# Patient Record
Sex: FEMALE | Race: WHITE | NOT HISPANIC OR LATINO | Employment: OTHER | URBAN - METROPOLITAN AREA
[De-identification: names, ages, dates, MRNs, and addresses within clinical notes are randomized per-mention and may not be internally consistent; named-entity substitution may affect disease eponyms.]

---

## 2017-01-02 ENCOUNTER — HOSPITAL ENCOUNTER (EMERGENCY)
Facility: OTHER | Age: 32
Discharge: HOME | End: 2017-01-02
Attending: OBSTETRICS & GYNECOLOGY
Payer: COMMERCIAL

## 2017-01-02 VITALS
DIASTOLIC BLOOD PRESSURE: 58 MMHG | HEART RATE: 74 BPM | TEMPERATURE: 98 F | OXYGEN SATURATION: 99 % | SYSTOLIC BLOOD PRESSURE: 90 MMHG

## 2017-01-02 DIAGNOSIS — Z3A.34 34 WEEKS GESTATION OF PREGNANCY: ICD-10-CM

## 2017-01-02 DIAGNOSIS — R80.9 PROTEIN IN URINE: ICD-10-CM

## 2017-01-02 DIAGNOSIS — J01.00 ACUTE NON-RECURRENT MAXILLARY SINUSITIS: Primary | ICD-10-CM

## 2017-01-02 LAB
ALBUMIN SERPL BCP-MCNC: 2.7 G/DL
ALP SERPL-CCNC: 201 U/L
ALT SERPL W/O P-5'-P-CCNC: 8 U/L
ANION GAP SERPL CALC-SCNC: 10 MMOL/L
AST SERPL-CCNC: 8 U/L
BASOPHILS # BLD AUTO: 0.02 K/UL
BASOPHILS NFR BLD: 0.2 %
BILIRUB SERPL-MCNC: 0.3 MG/DL
BUN SERPL-MCNC: 6 MG/DL
CALCIUM SERPL-MCNC: 8.7 MG/DL
CHLORIDE SERPL-SCNC: 105 MMOL/L
CO2 SERPL-SCNC: 20 MMOL/L
CREAT SERPL-MCNC: 0.7 MG/DL
CREAT UR-MCNC: 28.7 MG/DL
DIFFERENTIAL METHOD: ABNORMAL
EOSINOPHIL # BLD AUTO: 0.1 K/UL
EOSINOPHIL NFR BLD: 0.9 %
ERYTHROCYTE [DISTWIDTH] IN BLOOD BY AUTOMATED COUNT: 12.7 %
EST. GFR  (AFRICAN AMERICAN): >60 ML/MIN/1.73 M^2
EST. GFR  (NON AFRICAN AMERICAN): >60 ML/MIN/1.73 M^2
GLUCOSE SERPL-MCNC: 99 MG/DL
HCT VFR BLD AUTO: 33.4 %
HGB BLD-MCNC: 11.5 G/DL
LDH SERPL L TO P-CCNC: 160 U/L
LYMPHOCYTES # BLD AUTO: 1.5 K/UL
LYMPHOCYTES NFR BLD: 14.6 %
MCH RBC QN AUTO: 29.3 PG
MCHC RBC AUTO-ENTMCNC: 34.4 %
MCV RBC AUTO: 85 FL
MONOCYTES # BLD AUTO: 0.7 K/UL
MONOCYTES NFR BLD: 6.5 %
NEUTROPHILS # BLD AUTO: 8.1 K/UL
NEUTROPHILS NFR BLD: 77.4 %
PLATELET # BLD AUTO: 201 K/UL
PMV BLD AUTO: 9.1 FL
POTASSIUM SERPL-SCNC: 3.9 MMOL/L
PROT SERPL-MCNC: 6.5 G/DL
PROT UR-MCNC: 16 MG/DL
PROT/CREAT RATIO, UR: 0.56
RBC # BLD AUTO: 3.92 M/UL
SODIUM SERPL-SCNC: 135 MMOL/L
URATE SERPL-MCNC: 5 MG/DL
WBC # BLD AUTO: 10.4 K/UL

## 2017-01-02 PROCEDURE — 99283 EMERGENCY DEPT VISIT LOW MDM: CPT | Mod: ,,, | Performed by: OBSTETRICS & GYNECOLOGY

## 2017-01-02 RX ORDER — BUTALBITAL, ACETAMINOPHEN AND CAFFEINE 50; 325; 40 MG/1; MG/1; MG/1
1 TABLET ORAL EVERY 4 HOURS PRN
Qty: 15 TABLET | Refills: 0 | Status: SHIPPED | OUTPATIENT
Start: 2017-01-02 | End: 2017-01-10

## 2017-01-02 RX ORDER — BUTALBITAL, ACETAMINOPHEN AND CAFFEINE 50; 325; 40 MG/1; MG/1; MG/1
1 TABLET ORAL ONCE
Status: COMPLETED | OUTPATIENT
Start: 2017-01-02 | End: 2017-01-02

## 2017-01-02 RX ADMIN — METHYLPREDNISOLONE SODIUM SUCCINATE 20 MG: 40 INJECTION, POWDER, FOR SOLUTION INTRAMUSCULAR; INTRAVENOUS at 11:01

## 2017-01-02 RX ADMIN — SODIUM CHLORIDE, SODIUM LACTATE, POTASSIUM CHLORIDE, AND CALCIUM CHLORIDE 1000 ML: .6; .31; .03; .02 INJECTION, SOLUTION INTRAVENOUS at 11:01

## 2017-01-02 RX ADMIN — BUTALBITAL, ACETAMINOPHEN AND CAFFEINE 1 TABLET: 50; 325; 40 TABLET ORAL at 11:01

## 2017-01-02 NOTE — ED PROVIDER NOTES
Encounter Date: 2017       History     Chief Complaint   Patient presents with    Headache    Contractions     Review of patient's allergies indicates:   Allergen Reactions    Augmentin [amoxicillin-pot clavulanate] Nausea And Vomiting     HPI Comments: Zandra is a 30yo  at 34 5/7wga here for headache and sinus infection for the last week.  She is a CNM patient.   She states that around a week ago, she started having sinus pressure and headaches.  She tried several otc medications, but the sinus pain persisted.  She was started on zpack yesterday, which gave her diarrhea.  She then po hydrated, but started having contractions  At this point, she still has a headache and was hesitant to take her usual headache medicine, which is fioricet.   She states that contractions are mild, no gush of fluid or vaginal bleeding.  Normal fetal movemement.      Past Medical History   Diagnosis Date    Depression 2015     Lexapro and Wellbrutrin for about 8months.    Infertility     Migraine headache      for 4 months.before pregnancy at beginning of [facility]    Neurological abnormality      States she was diagnosed with Chiari malformation when being evaluated for migraines. Was told it is benign.     Past Medical History Pertinent Negatives   Diagnosis Date Noted    Abnormal Pap smear 2013    Abnormal Pap smear of cervix 2016    Asthma 2016    Blood dyscrasia 2016    Breast cancer 2016    Complication of anesthesia 2016    Coronary artery disease 2016    Deep vein thrombosis 2016    Diabetes mellitus 2016    Disorder of kidney and ureter 2016    Herpes simplex virus (HSV) infection 2016    HIV infection 2016    Hyperlipidemia 2016    Hypertension 2016    Liver disease 2016    Postpartum depression 2016    Rh incompatibility 2016    Seizures 2016    Sickle cell anemia 2016    Stroke 2016     Systemic lupus erythematosus 7/19/2016    Thyroid disease 7/19/2016    Trauma 7/19/2016    Varicosities 7/19/2016     Past Surgical History   Procedure Laterality Date    Breast reduction  2004    Laparscopic surgery       endometriosis    Breast surgery       reduction      Family History   Problem Relation Age of Onset    Prostate cancer Father     Breast cancer Mother 62     negative genetic testing     Colon cancer Maternal Aunt      Social History   Substance Use Topics    Smoking status: Never Smoker    Smokeless tobacco: Never Used    Alcohol use No     Review of Systems   Constitutional: Positive for activity change and fatigue. Negative for appetite change and fever.   Respiratory: Negative for cough, chest tightness, shortness of breath and wheezing.    Genitourinary: Negative for difficulty urinating, dysuria, frequency and vaginal bleeding.   Neurological: Positive for headaches. Negative for dizziness, weakness and light-headedness.       Physical Exam   Initial Vitals   BP Pulse Resp Temp SpO2   01/02/17 0956 01/02/17 0956 -- 01/02/17 0959 01/02/17 0959   98/63 80  98.1 °F (36.7 °C) 99 %     Physical Exam    Constitutional: She appears well-developed and well-nourished. She is not diaphoretic. No distress.   HENT:   Head: Normocephalic and atraumatic.   Cardiovascular: Normal rate, regular rhythm and normal heart sounds.   No murmur heard.  Pulmonary/Chest: Breath sounds normal. No respiratory distress. She has no wheezes. She has no rales.   Abdominal: Soft. She exhibits no distension. There is no tenderness. There is no rebound and no guarding.   Skin: Skin is warm and dry. No erythema. No pallor.   Psychiatric: She has a normal mood and affect. Judgment and thought content normal.     OB LABOR EXAM:   Pre-Term Labor: No.   Membranes ruptured: No.   Method: Sterile vaginal exam per MD.   Vaginal Bleeding: none present.     Dilatation: 0.   Station: -5.   Amniotic Fluid Color: no fluid.      Comments: NST: 120/reactive/no decels, moderate btbv  No contractions         ED Course   Procedures  Labs Reviewed   CBC W/ AUTO DIFFERENTIAL   COMPREHENSIVE METABOLIC PANEL   PROTEIN / CREATININE RATIO, URINE   LACTATE DEHYDROGENASE   URIC ACID                               ED Course   Comment By Time   Pt reevaluated.  Still with headache, got fioricet 20 minutes ago. IV fluids going in.  Pending labs  Dilia JOVAN Mesfin, DO 01/02 1133   Pt still with headache.  Labs reviewed.  Increased P:C ratio, but serum labs fine.  I still believe that headache is due to sinus infection, but she is asked to return 24h urine and follow up with CNM's tomorrow.  She is asked to come back to [facility] if she has any other concerns Dilia Toure, DO 01/02 1202     Clinical Impression:   The primary encounter diagnosis was Acute non-recurrent maxillary sinusitis. Diagnoses of Protein in urine and 34 weeks gestation of pregnancy were also pertinent to this visit.          Dilia Toure, DO  01/02/17 4048

## 2017-01-02 NOTE — ED AVS SNAPSHOT
[facility]  [address]  [facility] [address]               Zandra Sanchez   2017  9:48 AM   ED    Description:  Female : 1985   Department:  [facility]           Your Care was Coordinated By:     Provider Role From To    Dilia Toure DO Attending Provider 17 0957 --      Reason for Visit     Headache     Contractions           Diagnoses this Visit        Comments    Acute non-recurrent maxillary sinusitis    -  Primary     Protein in urine         34 weeks gestation of pregnancy           ED Disposition     ED Disposition Condition Comment    Discharge             To Do List           Follow-up Information     Follow up with [facility] In 1 day.    Why:  Blood pressure check and follow up 24hour urine    Contact information:    [address] [place] [address]  [phone]         These Medications        Disp Refills Start End    butalbital-acetaminophen-caffeine -40 mg (FIORICET, ESGIC) -40 mg per tablet 15 tablet 0 2017     Take 1 tablet by mouth every 4 (four) hours as needed for Pain. - Oral    [facility]: [facility], [place] - [address] [place] [address]. Ph #: [phone]         [facility] On Call     [facility] On [facility] Nurse Care Line -  Assistance  Registered nurses in the [facility] On [facility] Center provide clinical advisement, health education, appointment booking, and other advisory services.  Call for this free service at [phone].             Medications           Message regarding Medications     Verify the changes and/or additions to your medication regime listed below are the same as discussed with your clinician today.  If any of these changes or additions are incorrect, please notify your healthcare provider.        START taking these NEW medications        Refills    butalbital-acetaminophen-caffeine -40 mg (FIORICET, ESGIC) -40  mg per tablet 0    Sig: Take 1 tablet by mouth every 4 (four) hours as needed for Pain.    Class: Normal    Route: Oral      These medications were administered today        Dose Freq    lactated ringers bolus 1,000 mL 1,000 mL Once    Sig: Inject 1,000 mLs into the vein once.    Class: Normal    Route: Intravenous    butalbital-acetaminophen-caffeine -40 mg per tablet 1 tablet 1 tablet Once    Sig: Take 1 tablet by mouth once.    Class: Normal    Route: Oral    methylPREDNISolone sodium succinate injection 20 mg 20 mg Every 6 hours    Sig: Inject 20 mg into the muscle every 6 (six) hours.    Class: Normal    Route: Intramuscular           Verify that the below list of medications is an accurate representation of the medications you are currently taking.  If none reported, the list may be blank. If incorrect, please contact your healthcare provider. Carry this list with you in case of emergency.           Current Medications     butalbital-acetaminophen-caffeine -40 mg (FIORICET, ESGIC) -40 mg per tablet Take 1 tablet by mouth every 4 (four) hours as needed for Pain.    methylPREDNISolone sodium succinate injection 20 mg Inject 20 mg into the muscle every 6 (six) hours.    prenatal multivit-Ca-min-Fe-FA Tab Take by mouth.    progesterone (PROMETRIUM) 100 MG capsule            Clinical Reference Information           Your Vitals Were     BP Pulse Temp Last Period SpO2       90/58 74 98.1 °F (36.7 °C) 05/04/2016 99%       Allergies as of 1/2/2017        Reactions    Augmentin [Amoxicillin-pot Clavulanate] Nausea And Vomiting      Immunizations Administered on Date of Encounter - 1/2/2017     None      ED Micro, Lab, POCT     Start Ordered       Status Ordering Provider    01/02/17 1204 01/02/17 1203  Microalbumin, Timed Urine  Once      Acknowledged     01/02/17 1012 01/02/17 1012  CBC auto differential  STAT      Final result     01/02/17 1012 01/02/17 1012  Comprehensive metabolic panel  STAT       Final result     01/02/17 1012 01/02/17 1012  Protein / creatinine ratio, urine  Once      Final result     01/02/17 1012 01/02/17 1012  Lactate dehydrogenase  Once      Final result     01/02/17 1012 01/02/17 1012  Uric acid  STAT      Final result       ED Imaging Orders     None        Discharge Instructions       Call clinic [phone] or L & D after hours at [phone] for vaginal bleeding, leakage of fluids, regular contractions every 5 mins for 2 hours, decreased fetal movements ( 10 kicks in 2 hours), headache not relieved by Tylenol, blurry vision, or temp of 100.4 or greater.  Begin doing fetal kick counts, at least 10 movements in 2 hours starting at 28 weeks gestation.  Keep next clinic appointment      Your Scheduled Appointments     Asif 10, 2017  1:30 PM CST   Routine Prenatal Visit with WENDI Fay ([facility])    [address] [place] [id number] [phone]               [facility] complies with applicable Federal civil rights laws and does not discriminate on the basis of race, color, national origin, age, disability, or sex.        Language Assistance Services     ATTENTION: Language assistance services are available, free of charge. Please call [phone].      ATENCIÓN: Si habla español, tiene a joy disposición servicios gratuitos de asistencia lingüística. Llame al [phone].     [facility] Ý: N?u b?n nói Ti?ng Vi?t, có các d?ch v? h? tr? ngôn ng? mi?n phí dành cho b?n. G?i s? [phone].

## 2017-01-02 NOTE — PROGRESS NOTES
Per MD patient ok to discharge home.  Discharge instructions given and patient verbalized understanding.

## 2017-01-02 NOTE — DISCHARGE INSTRUCTIONS
Call clinic [phone] or L & D after hours at [phone] for vaginal bleeding, leakage of fluids, regular contractions every 5 mins for 2 hours, decreased fetal movements ( 10 kicks in 2 hours), headache not relieved by Tylenol, blurry vision, or temp of 100.4 or greater.  Begin doing fetal kick counts, at least 10 movements in 2 hours starting at 28 weeks gestation.  Keep next clinic appointment

## 2017-01-03 ENCOUNTER — TELEPHONE (OUTPATIENT)
Dept: OBSTETRICS AND GYNECOLOGY | Facility: CLINIC | Age: 32
End: 2017-01-03

## 2017-01-03 ENCOUNTER — ROUTINE PRENATAL (OUTPATIENT)
Dept: OBSTETRICS AND GYNECOLOGY | Facility: CLINIC | Age: 32
End: 2017-01-03
Payer: COMMERCIAL

## 2017-01-03 VITALS — DIASTOLIC BLOOD PRESSURE: 58 MMHG | SYSTOLIC BLOOD PRESSURE: 92 MMHG | WEIGHT: 155 LBS | BODY MASS INDEX: 23.57 KG/M2

## 2017-01-03 DIAGNOSIS — J01.40 ACUTE NON-RECURRENT PANSINUSITIS: ICD-10-CM

## 2017-01-03 DIAGNOSIS — Z34.83 ENCOUNTER FOR SUPERVISION OF OTHER NORMAL PREGNANCY IN THIRD TRIMESTER: Primary | ICD-10-CM

## 2017-01-03 DIAGNOSIS — R51.9 PREGNANCY HEADACHE IN THIRD TRIMESTER: ICD-10-CM

## 2017-01-03 DIAGNOSIS — O26.893 PREGNANCY HEADACHE IN THIRD TRIMESTER: ICD-10-CM

## 2017-01-03 LAB
PROT 24H UR-MRATE: NORMAL MG/SPEC
PROT UR-MCNC: <7 MG/DL
URINE COLLECTION DURATION: 24 HR
URINE VOLUME: 2275 ML

## 2017-01-03 PROCEDURE — 84156 ASSAY OF PROTEIN URINE: CPT

## 2017-01-03 PROCEDURE — 0502F SUBSEQUENT PRENATAL CARE: CPT | Mod: S$GLB,,, | Performed by: ADVANCED PRACTICE MIDWIFE

## 2017-01-03 PROCEDURE — 99999 PR PBB SHADOW E&M-EST. PATIENT-LVL III: CPT | Mod: PBBFAC,,, | Performed by: ADVANCED PRACTICE MIDWIFE

## 2017-01-03 RX ORDER — SULFAMETHOXAZOLE AND TRIMETHOPRIM 400; 80 MG/1; MG/1
1 TABLET ORAL 2 TIMES DAILY
Qty: 20 TABLET | Refills: 0 | Status: SHIPPED | OUTPATIENT
Start: 2017-01-03 | End: 2017-01-10

## 2017-01-03 RX ORDER — AZITHROMYCIN 250 MG/1
TABLET, FILM COATED ORAL
Refills: 0 | COMMUNITY
Start: 2017-01-01 | End: 2017-01-04

## 2017-01-03 RX ORDER — SULFAMETHOXAZOLE AND TRIMETHOPRIM 400; 80 MG/1; MG/1
1 TABLET ORAL 2 TIMES DAILY
Qty: 20 TABLET | Refills: 0 | Status: SHIPPED | OUTPATIENT
Start: 2017-01-03 | End: 2017-01-03 | Stop reason: SDUPTHER

## 2017-01-03 NOTE — TELEPHONE ENCOUNTER
"Discussed patient's continuing headache on the phone with her. She has had a sinus infection "for a month now." She has used neti pots, steaming, essential oils, saline nasal sprays, sudafed, mucinex, all without relief. She has started a Z pack now, as well. She used Fioricet several times yesterday, which helped her headache. She is currently at home in bed with another migraine and has taken two Fioricet this morning. She is completing a 24 urine protein, which will be done at 2pm today. I scheduled her an appointment for 2pm today.   ~ jose luis miller cnm    ----- Message from Joesph Thomas sent at 1/3/2017 10:22 AM CST -----  Contact: Malathi Burns  Per Malathi Burns## Pt needs to be seen today for severe headaches. Please call and advise her that she needs to been seen as soon as possible.    "

## 2017-01-03 NOTE — PROGRESS NOTES
"Patient was seen in the OB ED for this sinus infection and headache yesterday, as well. She has brought her 24 hour timed urine protein with her. She has had a sinus infection "for a month now." She has used neti pots, steaming, essential oils, saline nasal sprays, sudafed, mucinex, all with minimal relief. She has started a Z pack 3 days ago. She used Fioricet several times yesterday, which helped her headache. Has used Fioricet several times today and was starting to get relief this afternoon. No fever. She has been working minimally from home, so not overly stressed from work.    A: 1. Continued sinus infection, not responsive to Z-nitesh  2. R/O pre-eclampsia    P: 1. Consult with Dr. Soto  2. Rx: Bactrim 400/80mg - one po bid x 5 days, may take up to ten days. Not close to term. Begin tonight.  3. To call if dramatic improvement not noted over the next two days.  4. Comfort measures reviewed.   ~ jose luis miller cnm    "

## 2017-01-04 ENCOUNTER — TELEPHONE (OUTPATIENT)
Dept: OBSTETRICS AND GYNECOLOGY | Facility: CLINIC | Age: 32
End: 2017-01-04

## 2017-01-04 ENCOUNTER — HOSPITAL ENCOUNTER (EMERGENCY)
Facility: OTHER | Age: 32
Discharge: HOME | End: 2017-01-04
Attending: OBSTETRICS & GYNECOLOGY
Payer: COMMERCIAL

## 2017-01-04 VITALS
HEART RATE: 72 BPM | DIASTOLIC BLOOD PRESSURE: 59 MMHG | OXYGEN SATURATION: 96 % | SYSTOLIC BLOOD PRESSURE: 96 MMHG | TEMPERATURE: 98 F

## 2017-01-04 DIAGNOSIS — R51.9 HEADACHE, UNSPECIFIED HEADACHE TYPE: Primary | ICD-10-CM

## 2017-01-04 RX ORDER — BUTALBITAL, ACETAMINOPHEN AND CAFFEINE 50; 325; 40 MG/1; MG/1; MG/1
2 TABLET ORAL EVERY 6 HOURS PRN
Qty: 30 TABLET | Refills: 2 | Status: SHIPPED | OUTPATIENT
Start: 2017-01-04 | End: 2017-01-10

## 2017-01-04 RX ORDER — LANOLIN ALCOHOL/MO/W.PET/CERES
400 CREAM (GRAM) TOPICAL DAILY
Refills: 15 | COMMUNITY
Start: 2017-01-04 | End: 2017-01-10

## 2017-01-04 RX ORDER — PROMETHAZINE HYDROCHLORIDE 25 MG/1
25 TABLET ORAL EVERY 6 HOURS PRN
Qty: 30 TABLET | Refills: 6 | Status: SHIPPED | OUTPATIENT
Start: 2017-01-04 | End: 2017-01-10

## 2017-01-04 NOTE — TELEPHONE ENCOUNTER
----- Message from Chris Londono sent at 1/4/2017  1:40 PM CST -----  Contact: González with [facility]  x_ 1st Request  _ 2nd Request  _ 3rd Request    Who: González with [facility]    Why:González with [facility] is calling in regard to a Magnesium Oxide RX that was suppose to be called in for the patient. González with [facility] is needing a call back from staff today.    What Number to Call Back: González rocha [facility] can be reached at [phone].    When to Expect a call back: (Before the end of the day)  -- if call after 3:00 call back will be tomorrow.

## 2017-01-04 NOTE — TELEPHONE ENCOUNTER
----- Message from Joesph Thomas sent at 1/4/2017  9:18 AM CST -----  Contact: PT.  Pt states that she having another blinding headache today. She was seen yesterday by Rosamaria and was advise that if they get worse please call. Please call and advise her on what she needs to do at this point.

## 2017-01-04 NOTE — ED PROVIDER NOTES
"Encounter Date: 2017       History     Chief Complaint   Patient presents with    Migraine     Review of patient's allergies indicates:   Allergen Reactions    Augmentin [amoxicillin-pot clavulanate] Nausea And Vomiting     HPI Comments: Zandra Sanchez is a 31 y.o. [id number] at 35w0d presents complaining of new onset head ache in setting of recent sinus infection. Patient reports three days of headache that are worse in the morning than at night, bilateral in the front and as well as the the middle of the back of her head. Patient denies fever, N/V. Does endorse mild shortness of breath. Patient denies leg swelling or leg pain. Endorses photophobia and blurry vision.     Patient recently treated for sinusitis with azithromycin that was then switched to Bactrim. Patient reports decreased nasal drainingfollowing antibiotics use. Patient endorses history of "hormonal headaches" in the past related to her cycle. Patient usually takes daily magnesium sulfate "for general wellness" and has not taken any over the last week.     This IUP is uncomplicated.  Patient reports contractions intermittently, none organized, denies vaginal bleeding, denies LOF.   Fetal Movement: normal.      The history is provided by the patient.     Past Medical History   Diagnosis Date    Depression 2015     Lexapro and Wellbrutrin for about 8months.    Infertility     Migraine headache      for 4 months.before pregnancy at beginning of [facility]    Neurological abnormality      States she was diagnosed with Chiari malformation when being evaluated for migraines. Was told it is benign.     Past Medical History Pertinent Negatives   Diagnosis Date Noted    Abnormal Pap smear 2013    Abnormal Pap smear of cervix 2016    Asthma 2016    Blood dyscrasia 2016    Breast cancer 2016    Complication of anesthesia 2016    Coronary artery disease 2016    Deep vein thrombosis 2016    Diabetes mellitus " 7/19/2016    Disorder of kidney and ureter 7/19/2016    Herpes simplex virus (HSV) infection 7/19/2016    HIV infection 7/19/2016    Hyperlipidemia 7/19/2016    Hypertension 7/19/2016    Liver disease 7/19/2016    Postpartum depression 7/19/2016    Rh incompatibility 7/19/2016    Seizures 7/19/2016    Sickle cell anemia 7/19/2016    Stroke 7/19/2016    Systemic lupus erythematosus 7/19/2016    Thyroid disease 7/19/2016    Trauma 7/19/2016    Varicosities 7/19/2016     Past Surgical History   Procedure Laterality Date    Breast reduction  2004    Laparscopic surgery       endometriosis    Breast surgery       reduction      Family History   Problem Relation Age of Onset    Prostate cancer Father     Breast cancer Mother 62     negative genetic testing     Colon cancer Maternal Aunt      Social History   Substance Use Topics    Smoking status: Never Smoker    Smokeless tobacco: Never Used    Alcohol use No     Review of Systems   Constitutional: Negative for chills, diaphoresis and fever.   HENT: Positive for sinus pressure. Negative for sore throat.    Eyes: Positive for visual disturbance.   Respiratory: Positive for shortness of breath.    Cardiovascular: Negative for leg swelling.   Gastrointestinal: Negative for abdominal pain, diarrhea and nausea.   Genitourinary: Negative for dysuria.   Musculoskeletal: Positive for neck pain. Negative for back pain.   Skin: Negative for rash.   Neurological: Negative for weakness.   Hematological: Does not bruise/bleed easily.       Physical Exam   Initial Vitals   BP Pulse Resp Temp SpO2   01/04/17 1023 01/04/17 1023 -- 01/04/17 1026 01/04/17 1027   110/74 82  97.5 °F (36.4 °C) 96 %     Physical Exam    Constitutional: She appears well-developed and well-nourished. No distress.   HENT:   Head: Normocephalic and atraumatic.   No sinus tenderness   Cardiovascular: Normal rate, regular rhythm and normal heart sounds. Exam reveals no gallop and no friction  rub.    No murmur heard.  Pulmonary/Chest: No respiratory distress. She has no wheezes. She has no rhonchi. She has no rales.   Neurological: She is alert and oriented to person, place, and time.   Psychiatric: She has a normal mood and affect.     OB LABOR EXAM:                     Comments: FHT: reassuring, 135 bpm, mod BTB variability, + accels, -decels  TOCO: irritable       ED Course   Procedures  Labs Reviewed - No data to display          Medical Decision Making:   Initial Assessment:   30 yo  at 35w with recent history of sinusitis treated with abx presents with bilateral headaches for 3 days.   Differential Diagnosis:   Viral illness vs rebound HA vs sinus infection vs pre eclampsia symptom vs HA of intracranial etiology vs meningitis  - Patient denies fever, N/V. Does endorse malaise. Patient recently treated with antibiotics. Does not appear septic. Likely not meningitis. Possible superimposed infection of sinuses, however no tenderness to sinus on physical.   - Pressures wnl, negative protein in 24 hour urine yesterday, trace urine on dipstick today. Not likely pre eclampsia related  - Likely headache of unknown etiology, possibly secondary to deficient water intake. Will treat symptomatically at this time.  ED Management:  - Focused physical exam  - Continuous fetal monitoring  - Patient in stable condition, able to be discharged home  - Magnesium oxide 400mg tablet qd for HA relief, phergan PRN q6 for HA relief. Patient has fioricet at home. Counseled regarding possibility of rebound HA when stopping suddenly  - Counseled patient regarding s/sx of pre eclampsia. To return if develops unrelenting HA, vision changes, difficulty breathing, chest pain, RUQ pain or new leg edema. Will take further action at that time.                     ED Course     Clinical Impression:   The encounter diagnosis was Headache, unspecified headache type.        Vikas Blackwell MD  OB/GYN PGY-1  Pager: [phone]        Vikas Blackwell MD  Resident  01/04/17 1756

## 2017-01-04 NOTE — ED AVS SNAPSHOT
[facility]  [address]  [facility] [address]               Zandra Sanchez   2017 10:13 AM   ED    Description:  Female : 1985   Department:  [facility]           Your Care was Coordinated By:     Provider Role From To    Sam Allen MD Attending Provider 17 1142 --      Reason for Visit     Migraine           Diagnoses this Visit        Comments    Headache, unspecified headache type    -  Primary       ED Disposition     ED Disposition Condition Comment    Discharge             To Do List            These Medications        Disp Refills Start End    promethazine (PHENERGAN) 25 MG tablet 30 tablet 6 2017     Take 1 tablet (25 mg total) by mouth every 6 (six) hours as needed for Nausea. - Oral    [facility]: [facility] [place] [address] [place]. Ph #: [phone]         PURCHASE these Medications (No prescription required)        Start End    magnesium oxide (MAG-OX) 400 mg tablet 2017     Sig - Route: Take 1 tablet (400 mg total) by mouth once daily. - Oral    Class: OTC      [facility] On Call     [facility] On Call Nurse Care Line -  Assistance  Registered nurses in the [facility] On Call Center provide clinical advisement, health education, appointment booking, and other advisory services.  Call for this free service at [phone].             Medications           Message regarding Medications     Verify the changes and/or additions to your medication regime listed below are the same as discussed with your clinician today.  If any of these changes or additions are incorrect, please notify your healthcare provider.        START taking these NEW medications        Refills    magnesium oxide (MAG-OX) 400 mg tablet 15    Sig: Take 1 tablet (400 mg total) by mouth once daily.    Class: OTC    Route: Oral    promethazine (PHENERGAN) 25 MG tablet 6    Sig: Take 1 tablet (25 mg total) by  mouth every 6 (six) hours as needed for Nausea.    Class: Normal    Route: Oral           Verify that the below list of medications is an accurate representation of the medications you are currently taking.  If none reported, the list may be blank. If incorrect, please contact your healthcare provider. Carry this list with you in case of emergency.           Current Medications     azithromycin (Z-JESUS MANUEL) 250 MG tablet take 2 tablets by mouth on day 1 then 1 tablet on days 2 through 5    butalbital-acetaminophen-caffeine -40 mg (FIORICET, ESGIC) -40 mg per tablet Take 1 tablet by mouth every 4 (four) hours as needed for Pain.    magnesium oxide (MAG-OX) 400 mg tablet Take 1 tablet (400 mg total) by mouth once daily.    promethazine (PHENERGAN) 25 MG tablet Take 1 tablet (25 mg total) by mouth every 6 (six) hours as needed for Nausea.    sulfamethoxazole-trimethoprim 400-80mg (BACTRIM) 400-80 mg per tablet Take 1 tablet by mouth 2 (two) times daily.           Clinical Reference Information           Your Vitals Were     BP Pulse Temp Last Period SpO2       96/59 72 97.5 °F (36.4 °C) 05/04/2016 96%       Allergies as of 1/4/2017        Reactions    Augmentin [Amoxicillin-pot Clavulanate] Nausea And Vomiting      Immunizations Administered on Date of Encounter - 1/4/2017     None      ED Micro, Lab, POCT     None      ED Imaging Orders     None      Your Scheduled Appointments     Asif 10, 2017  1:30 PM CST   Routine Prenatal Visit with Tyesha Hinds CNM   [facility]    [address] [place] [id number] [phone]               [facility] complies with applicable Federal civil rights laws and does not discriminate on the basis of race, color, national origin, age, disability, or sex.        Language Assistance Services     ATTENTION: Language assistance services are available, free of charge. Please call [phone].      ATENCIÓN: Sai garcia  tiene a joy disposición servicios gratuitos de asistencia lingüística. Llame al [phone].     NAZ Ý: N?u b?n nói Ti?ng Vi?t, có các d?ch v? h? tr? ngôn ng? mi?n phí serinah cho b?n. G?i s? [phone].

## 2017-01-04 NOTE — TELEPHONE ENCOUNTER
I spoke with pharmacist at [facility] and informed him the Magnesium Oxide is available OTC and the patient needs to take 400mg daily.

## 2017-01-04 NOTE — ED PROVIDER NOTES
Encounter Date: 1/4/2017       History     Chief Complaint   Patient presents with    Migraine     Review of patient's allergies indicates:   Allergen Reactions    Augmentin [amoxicillin-pot clavulanate] Nausea And Vomiting     HPI  Past Medical History   Diagnosis Date    Depression 06/2015     Lexapro and Wellbrutrin for about 8months.    Infertility     Migraine headache      for 4 months.before pregnancy at beginning of cyles    Neurological abnormality      States she was diagnosed with Chiari malformation when being evaluated for migraines. Was told it is benign.     Past Medical History Pertinent Negatives   Diagnosis Date Noted    Abnormal Pap smear 9/5/2013    Abnormal Pap smear of cervix 7/19/2016    Asthma 7/19/2016    Blood dyscrasia 7/19/2016    Breast cancer 7/19/2016    Complication of anesthesia 7/19/2016    Coronary artery disease 7/19/2016    Deep vein thrombosis 7/19/2016    Diabetes mellitus 7/19/2016    Disorder of kidney and ureter 7/19/2016    Herpes simplex virus (HSV) infection 7/19/2016    HIV infection 7/19/2016    Hyperlipidemia 7/19/2016    Hypertension 7/19/2016    Liver disease 7/19/2016    Postpartum depression 7/19/2016    Rh incompatibility 7/19/2016    Seizures 7/19/2016    Sickle cell anemia 7/19/2016    Stroke 7/19/2016    Systemic lupus erythematosus 7/19/2016    Thyroid disease 7/19/2016    Trauma 7/19/2016    Varicosities 7/19/2016     Past Surgical History   Procedure Laterality Date    Breast reduction  2004    Laparscopic surgery       endometriosis    Breast surgery       reduction      Family History   Problem Relation Age of Onset    Prostate cancer Father     Breast cancer Mother 62     negative genetic testing     Colon cancer Maternal Aunt      Social History   Substance Use Topics    Smoking status: Never Smoker    Smokeless tobacco: Never Used    Alcohol use No     Review of Systems    Physical Exam   Initial Vitals   BP Pulse  Resp Temp SpO2   01/04/17 1023 01/04/17 1023 -- 01/04/17 1026 01/04/17 1027   110/74 82  97.5 °F (36.4 °C) 96 %     Physical Exam    ED Course   Procedures  Labs Reviewed - No data to display                       Attending Attestation:   Physician Attestation Statement for Resident:  As the supervising MD   Physician Attestation Statement: I have personally seen and examined this patient.   I agree with the above history. -:   As the supervising MD I agree with the above PE.    As the supervising MD I agree with the above treatment, course, plan, and disposition.  I have reviewed and agree with the residents interpretation of the following: rhythm strips.  I have reviewed the following: old records at this facility.            Attending ED Notes:   Reviewed headache history with pt and significant other.  Will tx with Mag oxide, fioricet, and phenergan.  If no improvement, would recommend admission for Neurology consult, +/- MRI.  No evidence of Pre-E at this point.          ED Course     Clinical Impression:   The encounter diagnosis was Headache, unspecified headache type.    Disposition:   Disposition: Discharged  Condition: Stable       Sam Allen MD  01/04/17 1306

## 2017-01-05 ENCOUNTER — TELEPHONE (OUTPATIENT)
Dept: MATERNAL FETAL MEDICINE | Facility: CLINIC | Age: 32
End: 2017-01-05

## 2017-01-05 ENCOUNTER — TELEPHONE (OUTPATIENT)
Dept: OBSTETRICS AND GYNECOLOGY | Facility: CLINIC | Age: 32
End: 2017-01-05

## 2017-01-05 ENCOUNTER — TELEPHONE (OUTPATIENT)
Dept: NEUROLOGY | Facility: CLINIC | Age: 32
End: 2017-01-05

## 2017-01-05 DIAGNOSIS — R51.9 PERSISTENT HEADACHES: Primary | ICD-10-CM

## 2017-01-05 NOTE — TELEPHONE ENCOUNTER
Called and left voicemail requesting call back as they called and were on hold however hung up telephone prior to me getting on the line.     Serina Barbosa CNM/NP  Certified Nurse Midwife/Nurse Practitioner  1/5/2017 3:19 PM

## 2017-01-05 NOTE — TELEPHONE ENCOUNTER
Patient calling because she is having a persistent headache unrelieved by Fioricet and magnesium and feels that she needs more evaluation.  After discussion and review of her records by  a ambulatory consult to neurology is recommended.  Patient verbalized understanding.  Patient has seen a neurosurgeon that is her cousin and was diagnosed with something he said was not harmful but could cause headaches.  Patient will call him and try to see him again also or get records and call me back.  Im waiting on a call back from Neuro at this time.

## 2017-01-05 NOTE — TELEPHONE ENCOUNTER
Called  and informed him that we have made an appointment for Patient to be seen tomorrow at 2:40 to see Dr Welch for her migraines.I wished them both the best,and requested him to please communicate with is wife that I hope she will feel better soon.

## 2017-01-10 ENCOUNTER — ROUTINE PRENATAL (OUTPATIENT)
Dept: OBSTETRICS AND GYNECOLOGY | Facility: CLINIC | Age: 32
End: 2017-01-10
Payer: COMMERCIAL

## 2017-01-10 VITALS — SYSTOLIC BLOOD PRESSURE: 110 MMHG | BODY MASS INDEX: 23.57 KG/M2 | WEIGHT: 155 LBS | DIASTOLIC BLOOD PRESSURE: 60 MMHG

## 2017-01-10 DIAGNOSIS — G93.5 CHIARI I MALFORMATION: ICD-10-CM

## 2017-01-10 DIAGNOSIS — Z34.83 ENCOUNTER FOR SUPERVISION OF OTHER NORMAL PREGNANCY IN THIRD TRIMESTER: ICD-10-CM

## 2017-01-10 DIAGNOSIS — Z34.93 PREGNANCY, OBSTETRICAL CARE, THIRD TRIMESTER: Primary | ICD-10-CM

## 2017-01-10 PROCEDURE — 99999 PR PBB SHADOW E&M-EST. PATIENT-LVL II: CPT | Mod: PBBFAC,,, | Performed by: ADVANCED PRACTICE MIDWIFE

## 2017-01-10 PROCEDURE — 0502F SUBSEQUENT PRENATAL CARE: CPT | Mod: S$GLB,,, | Performed by: ADVANCED PRACTICE MIDWIFE

## 2017-01-10 PROCEDURE — 87081 CULTURE SCREEN ONLY: CPT

## 2017-01-10 NOTE — PROGRESS NOTES
31 y.o. female  at 36w2d by LMP c/w 9wk US  Reports + FM, denies VB, LOF or regular CTX  Feeling so much better - was having intractable migraine HA for several days  Went to ED for evaluation  Finally saw neurologist Dr Ashvin Luis at [facility] - negative head scan  Ended up being r/t sinuses; feeling improved   Otherwise, wants to review dating -- LMP was incorrectly entered as 5/4  She is certain LMP is 5/1 and this is c/w her 9wk US (previous 5wk US was without identifiable fetal pole)  TW lbs for pre preg BMI of 19; great job!  Delivery consents signed today  GBS collected today   Reviewed [place] department of Health recommendation for repeat HIV/RPR today; she declines   Discussed implications for peds r/t repeat HIV/RPR and she continues to decline   Reviewed warning signs, normal FKCs, labor precautions and how/when to call.  RTC x 1 wks, call or present sooner prn.

## 2017-01-14 LAB — BACTERIA SPEC AEROBE CULT: NORMAL

## 2017-01-17 ENCOUNTER — ROUTINE PRENATAL (OUTPATIENT)
Dept: OBSTETRICS AND GYNECOLOGY | Facility: CLINIC | Age: 32
End: 2017-01-17
Payer: COMMERCIAL

## 2017-01-17 VITALS — BODY MASS INDEX: 23.87 KG/M2 | WEIGHT: 157 LBS | DIASTOLIC BLOOD PRESSURE: 70 MMHG | SYSTOLIC BLOOD PRESSURE: 112 MMHG

## 2017-01-17 DIAGNOSIS — Z34.93 PRENATAL CARE IN THIRD TRIMESTER: Primary | ICD-10-CM

## 2017-01-17 PROCEDURE — 99999 PR PBB SHADOW E&M-EST. PATIENT-LVL I: CPT | Mod: PBBFAC,,, | Performed by: ADVANCED PRACTICE MIDWIFE

## 2017-01-17 PROCEDURE — 0502F SUBSEQUENT PRENATAL CARE: CPT | Mod: S$GLB,,, | Performed by: ADVANCED PRACTICE MIDWIFE

## 2017-01-17 NOTE — PROGRESS NOTES
31 y.o. female  at 37w2d by LMP c/w 9wk US  Reports + FM, denies VB, LOF or regular CTX  Requesting SVE today  Delivery consents already signed  Reviewed GBS neg  Previously declined repeat HIV/RPR and continues to do so today  Reviewed warning signs, normal FKCs, labor precautions and how/when to call.  RTC x 1 wks, call or present sooner prn.

## 2017-01-19 ENCOUNTER — TELEPHONE (OUTPATIENT)
Dept: OBSTETRICS AND GYNECOLOGY | Facility: CLINIC | Age: 32
End: 2017-01-19

## 2017-01-20 NOTE — TELEPHONE ENCOUNTER
Patient reports intermittent series of contractions, which resolve. She reports normal FM, no vaginal, no LOF, no fever. Reassurance offered.    ~ jose luis miller cnm    ----- Message from Joesph Thomas sent at 1/19/2017  3:54 PM CST -----  Contact: pt  Is concerned about having false labor on and off. Would like to know if this would put stress on the baby. Please call and advise.

## 2017-01-23 ENCOUNTER — ROUTINE PRENATAL (OUTPATIENT)
Dept: OBSTETRICS AND GYNECOLOGY | Facility: CLINIC | Age: 32
End: 2017-01-23
Payer: COMMERCIAL

## 2017-01-23 VITALS — DIASTOLIC BLOOD PRESSURE: 64 MMHG | BODY MASS INDEX: 23.87 KG/M2 | WEIGHT: 157 LBS | SYSTOLIC BLOOD PRESSURE: 108 MMHG

## 2017-01-23 DIAGNOSIS — Z3A.38 38 WEEKS GESTATION OF PREGNANCY: ICD-10-CM

## 2017-01-23 DIAGNOSIS — Z34.93 PRENATAL CARE IN THIRD TRIMESTER: Primary | ICD-10-CM

## 2017-01-23 DIAGNOSIS — Z34.83 ENCOUNTER FOR SUPERVISION OF OTHER NORMAL PREGNANCY IN THIRD TRIMESTER: ICD-10-CM

## 2017-01-23 PROCEDURE — 99999 PR PBB SHADOW E&M-EST. PATIENT-LVL I: CPT | Mod: PBBFAC,,, | Performed by: ADVANCED PRACTICE MIDWIFE

## 2017-01-23 PROCEDURE — 0502F SUBSEQUENT PRENATAL CARE: CPT | Mod: S$GLB,,, | Performed by: ADVANCED PRACTICE MIDWIFE

## 2017-01-23 NOTE — PROGRESS NOTES
S: Here to rule out ROM. States she had several episodes today and several yesterday when a small amount of fluid came from her vagina. Has not soaked a pad between episodes. Denies RUCs. States baby is moving well. FMR protocol reviewed.  O: SSE: No fluid noted in vaginal vault: no pooling with coughing. Scant amount thick white discharge on speculum when it was removed. Discharge negative for Nitrozine and negative for ferning. Cx per vaginal exam: 1cm/20%/-3 station.  A: IBOW.   P: ROM S&S reviewed.

## 2017-01-25 ENCOUNTER — TELEPHONE (OUTPATIENT)
Dept: OBSTETRICS AND GYNECOLOGY | Facility: CLINIC | Age: 32
End: 2017-01-25

## 2017-01-25 NOTE — TELEPHONE ENCOUNTER
Ret call  States she has cold  Took sudafed today and benedryl last night.  Also stated decreased fetal movement   Will eat and drink and call back if she wants to come in for movement check

## 2017-01-25 NOTE — TELEPHONE ENCOUNTER
----- Message from Joesph Thomas sent at 1/25/2017 10:04 AM CST -----  Contact: pt  PT called because she has a cold and she has been taking Sudafed. She states that she feels like the baby isn't moving as much. Please call and advise.

## 2017-01-29 ENCOUNTER — ANESTHESIA (OUTPATIENT)
Dept: OBSTETRICS AND GYNECOLOGY | Facility: OTHER | Age: 32
End: 2017-01-29
Payer: COMMERCIAL

## 2017-01-29 ENCOUNTER — HOSPITAL ENCOUNTER (INPATIENT)
Facility: OTHER | Age: 32
LOS: 2 days | Discharge: HOME | End: 2017-01-31
Attending: OBSTETRICS & GYNECOLOGY | Admitting: OBSTETRICS & GYNECOLOGY
Payer: COMMERCIAL

## 2017-01-29 ENCOUNTER — ANESTHESIA EVENT (OUTPATIENT)
Dept: OBSTETRICS AND GYNECOLOGY | Facility: OTHER | Age: 32
End: 2017-01-29
Payer: COMMERCIAL

## 2017-01-29 DIAGNOSIS — Z37.9 NORMAL LABOR: ICD-10-CM

## 2017-01-29 LAB
ABO + RH BLD: NORMAL
BASOPHILS # BLD AUTO: 0.01 K/UL
BASOPHILS NFR BLD: 0.1 %
BLD GP AB SCN CELLS X3 SERPL QL: NORMAL
DIFFERENTIAL METHOD: ABNORMAL
EOSINOPHIL # BLD AUTO: 0 K/UL
EOSINOPHIL NFR BLD: 0.1 %
ERYTHROCYTE [DISTWIDTH] IN BLOOD BY AUTOMATED COUNT: 12.6 %
HCT VFR BLD AUTO: 37.3 %
HGB BLD-MCNC: 13 G/DL
LYMPHOCYTES # BLD AUTO: 1.5 K/UL
LYMPHOCYTES NFR BLD: 9.3 %
MCH RBC QN AUTO: 28.9 PG
MCHC RBC AUTO-ENTMCNC: 34.9 %
MCV RBC AUTO: 83 FL
MONOCYTES # BLD AUTO: 0.4 K/UL
MONOCYTES NFR BLD: 2.4 %
NEUTROPHILS # BLD AUTO: 14.2 K/UL
NEUTROPHILS NFR BLD: 87.9 %
PLATELET # BLD AUTO: 202 K/UL
PMV BLD AUTO: 9.2 FL
RBC # BLD AUTO: 4.5 M/UL
WBC # BLD AUTO: 16.09 K/UL

## 2017-01-29 PROCEDURE — 27200710 HC EPIDURAL INFUSION PUMP SET: Performed by: ANESTHESIOLOGY

## 2017-01-29 PROCEDURE — 25000003 PHARM REV CODE 250: Performed by: ANESTHESIOLOGY

## 2017-01-29 PROCEDURE — 25000003 PHARM REV CODE 250: Performed by: ADVANCED PRACTICE MIDWIFE

## 2017-01-29 PROCEDURE — 63600175 PHARM REV CODE 636 W HCPCS: Performed by: ANESTHESIOLOGY

## 2017-01-29 PROCEDURE — 59409 OBSTETRICAL CARE: CPT | Mod: QK,,, | Performed by: ANESTHESIOLOGY

## 2017-01-29 PROCEDURE — 27800517 HC TRAY,EPIDURAL-CONTINUOUS: Performed by: ANESTHESIOLOGY

## 2017-01-29 PROCEDURE — 72100002 HC LABOR CARE, 1ST 8 HOURS

## 2017-01-29 PROCEDURE — 85025 COMPLETE CBC W/AUTO DIFF WBC: CPT

## 2017-01-29 PROCEDURE — 99284 EMERGENCY DEPT VISIT MOD MDM: CPT

## 2017-01-29 PROCEDURE — 62326 NJX INTERLAMINAR LMBR/SAC: CPT | Performed by: ANESTHESIOLOGY

## 2017-01-29 PROCEDURE — 11000001 HC ACUTE MED/SURG PRIVATE ROOM

## 2017-01-29 PROCEDURE — 10907ZC DRAINAGE OF AMNIOTIC FLUID, THERAPEUTIC FROM PRODUCTS OF CONCEPTION, VIA NATURAL OR ARTIFICIAL OPENING: ICD-10-PCS | Performed by: ADVANCED PRACTICE MIDWIFE

## 2017-01-29 PROCEDURE — 86850 RBC ANTIBODY SCREEN: CPT

## 2017-01-29 PROCEDURE — 86900 BLOOD TYPING SEROLOGIC ABO: CPT

## 2017-01-29 PROCEDURE — 0HQ9XZZ REPAIR PERINEUM SKIN, EXTERNAL APPROACH: ICD-10-PCS | Performed by: ADVANCED PRACTICE MIDWIFE

## 2017-01-29 PROCEDURE — 99281 EMR DPT VST MAYX REQ PHY/QHP: CPT | Mod: 25,,, | Performed by: OBSTETRICS & GYNECOLOGY

## 2017-01-29 RX ORDER — SODIUM CHLORIDE, SODIUM LACTATE, POTASSIUM CHLORIDE, CALCIUM CHLORIDE 600; 310; 30; 20 MG/100ML; MG/100ML; MG/100ML; MG/100ML
INJECTION, SOLUTION INTRAVENOUS CONTINUOUS
Status: DISCONTINUED | OUTPATIENT
Start: 2017-01-29 | End: 2017-01-31 | Stop reason: HOSPADM

## 2017-01-29 RX ORDER — FENTANYL/BUPIVACAINE/NS/PF 2MCG/ML-.1
PLASTIC BAG, INJECTION (ML) INJECTION CONTINUOUS PRN
Status: DISCONTINUED | OUTPATIENT
Start: 2017-01-29 | End: 2017-01-29

## 2017-01-29 RX ORDER — CARBOPROST TROMETHAMINE 250 UG/ML
INJECTION, SOLUTION INTRAMUSCULAR
Status: DISCONTINUED
Start: 2017-01-29 | End: 2017-01-30 | Stop reason: WASHOUT

## 2017-01-29 RX ORDER — OXYTOCIN/RINGER'S LACTATE 20/1000 ML
2 PLASTIC BAG, INJECTION (ML) INTRAVENOUS CONTINUOUS
Status: DISCONTINUED | OUTPATIENT
Start: 2017-01-29 | End: 2017-01-31 | Stop reason: HOSPADM

## 2017-01-29 RX ORDER — OXYTOCIN/RINGER'S LACTATE 20/1000 ML
41.65 PLASTIC BAG, INJECTION (ML) INTRAVENOUS CONTINUOUS
Status: CANCELLED | OUTPATIENT
Start: 2017-01-30 | End: 2017-01-30

## 2017-01-29 RX ORDER — FENTANYL CITRATE 50 UG/ML
INJECTION, SOLUTION INTRAMUSCULAR; INTRAVENOUS
Status: DISCONTINUED | OUTPATIENT
Start: 2017-01-29 | End: 2017-01-29

## 2017-01-29 RX ORDER — METHYLERGONOVINE MALEATE 0.2 MG/ML
INJECTION INTRAVENOUS
Status: DISCONTINUED
Start: 2017-01-29 | End: 2017-01-30 | Stop reason: WASHOUT

## 2017-01-29 RX ORDER — BUPIVACAINE HYDROCHLORIDE 2.5 MG/ML
INJECTION, SOLUTION EPIDURAL; INFILTRATION; INTRACAUDAL
Status: DISPENSED
Start: 2017-01-29 | End: 2017-01-30

## 2017-01-29 RX ORDER — LIDOCAINE HYDROCHLORIDE 10 MG/ML
INJECTION INFILTRATION; PERINEURAL
Status: DISCONTINUED
Start: 2017-01-29 | End: 2017-01-30 | Stop reason: WASHOUT

## 2017-01-29 RX ORDER — LIDOCAINE HYDROCHLORIDE AND EPINEPHRINE 15; 5 MG/ML; UG/ML
INJECTION, SOLUTION EPIDURAL
Status: DISCONTINUED | OUTPATIENT
Start: 2017-01-29 | End: 2017-01-29

## 2017-01-29 RX ORDER — SODIUM CITRATE AND CITRIC ACID MONOHYDRATE 334; 500 MG/5ML; MG/5ML
30 SOLUTION ORAL ONCE
Status: DISCONTINUED | OUTPATIENT
Start: 2017-01-29 | End: 2017-01-31 | Stop reason: HOSPADM

## 2017-01-29 RX ORDER — FAMOTIDINE 10 MG/ML
20 INJECTION INTRAVENOUS ONCE
Status: DISCONTINUED | OUTPATIENT
Start: 2017-01-29 | End: 2017-01-31 | Stop reason: HOSPADM

## 2017-01-29 RX ORDER — MISOPROSTOL 200 UG/1
TABLET ORAL
Status: DISCONTINUED
Start: 2017-01-29 | End: 2017-01-30 | Stop reason: WASHOUT

## 2017-01-29 RX ORDER — FENTANYL CITRATE 50 UG/ML
INJECTION, SOLUTION INTRAMUSCULAR; INTRAVENOUS
Status: DISPENSED
Start: 2017-01-29 | End: 2017-01-30

## 2017-01-29 RX ORDER — FENTANYL/BUPIVACAINE/NS/PF 2MCG/ML-.1
PLASTIC BAG, INJECTION (ML) INJECTION CONTINUOUS
Status: DISCONTINUED | OUTPATIENT
Start: 2017-01-29 | End: 2017-01-31 | Stop reason: HOSPADM

## 2017-01-29 RX ORDER — FENTANYL/BUPIVACAINE/NS/PF 2MCG/ML-.1
PLASTIC BAG, INJECTION (ML) INJECTION
Status: DISPENSED
Start: 2017-01-29 | End: 2017-01-30

## 2017-01-29 RX ORDER — OXYTOCIN 10 [USP'U]/ML
INJECTION, SOLUTION INTRAMUSCULAR; INTRAVENOUS
Status: DISPENSED
Start: 2017-01-29 | End: 2017-01-30

## 2017-01-29 RX ADMIN — FENTANYL CITRATE 100 MCG: 50 INJECTION, SOLUTION INTRAMUSCULAR; INTRAVENOUS at 09:01

## 2017-01-29 RX ADMIN — SODIUM CHLORIDE, SODIUM LACTATE, POTASSIUM CHLORIDE, AND CALCIUM CHLORIDE 1000 ML: .6; .31; .03; .02 INJECTION, SOLUTION INTRAVENOUS at 09:01

## 2017-01-29 RX ADMIN — Medication 5 ML: at 09:01

## 2017-01-29 RX ADMIN — SODIUM CHLORIDE, SODIUM LACTATE, POTASSIUM CHLORIDE, AND CALCIUM CHLORIDE: .6; .31; .03; .02 INJECTION, SOLUTION INTRAVENOUS at 09:01

## 2017-01-29 RX ADMIN — LIDOCAINE HYDROCHLORIDE AND EPINEPHRINE 3 ML: 15; 5 INJECTION, SOLUTION EPIDURAL at 09:01

## 2017-01-29 RX ADMIN — Medication 10 ML/HR: at 09:01

## 2017-01-29 NOTE — ED PROVIDER NOTES
Encounter Date: 2017       History     Chief Complaint   Patient presents with    Contractions     Review of patient's allergies indicates:   Allergen Reactions    Augmentin [amoxicillin-pot clavulanate] Nausea And Vomiting     HPI Comments: 32 yo  @ 39 0/7 presents back with painful contractions.     The history is provided by the patient.     Past Medical History   Diagnosis Date    Depression 2015     Lexapro and Wellbrutrin for about 8months.    Infertility     Migraine headache      for 4 months.before pregnancy at beginning of cyles    Neurological abnormality      States she was diagnosed with Chiari malformation when being evaluated for migraines. Was told it is benign.     Past Medical History Pertinent Negatives   Diagnosis Date Noted    Abnormal Pap smear 2013    Abnormal Pap smear of cervix 2016    Asthma 2016    Blood dyscrasia 2016    Breast cancer 2016    Complication of anesthesia 2016    Coronary artery disease 2016    Deep vein thrombosis 2016    Diabetes mellitus 2016    Disorder of kidney and ureter 2016    Herpes simplex virus (HSV) infection 2016    HIV infection 2016    Hyperlipidemia 2016    Hypertension 2016    Liver disease 2016    Postpartum depression 2016    Rh incompatibility 2016    Seizures 2016    Sickle cell anemia 2016    Stroke 2016    Systemic lupus erythematosus 2016    Thyroid disease 2016    Trauma 2016    Varicosities 2016     Past Surgical History   Procedure Laterality Date    Breast reduction  2004    Laparscopic surgery       endometriosis    Breast surgery       reduction      Family History   Problem Relation Age of Onset    Prostate cancer Father     Breast cancer Mother 62     negative genetic testing     Colon cancer Maternal Aunt      Social History   Substance Use Topics    Smoking status: Never  Smoker    Smokeless tobacco: Never Used    Alcohol use No     Review of Systems    Physical Exam   Initial Vitals   BP Pulse Resp Temp SpO2   -- -- -- -- --            Visit Vitals    /77    Pulse 90    Temp 97.7 °F (36.5 °C)    LMP 05/01/2016    SpO2 100%       Physical Exam    ED Course   Procedures  Labs Reviewed - No data to display          Medical Decision Making:   Initial Assessment:   Active labor  ED Management:  Patient evaluated by CNM - cervical exam found to be 4/90/-1 soft, membranes intact  FHR reassuring.  Stable for transfer to [facility] for delivery.                    ED Course     Clinical Impression:   The encounter diagnosis was Normal labor.          Claritza Miller MD  01/29/17 9880

## 2017-01-29 NOTE — H&P
[facility]  History & Physical  Obstetrics      SUBJECTIVE:     Chief Complaint/Reason for Admission: strong contractions with term pregnancy    History of Present Illness:  Zandra Sanchez is a 31 y.o.  female with an Estimated Date of Delivery: 17 admitted for labor management.  Her current obstetrical history is significant for twins with vaginal birth.  She reports contractions since ROS WNL for labor. Fetal Movement: contractions since 01:00 today.      (Not in a hospital admission)    Review of patient's allergies indicates:   Allergen Reactions    Augmentin [amoxicillin-pot clavulanate] Nausea And Vomiting        Past Medical History   Diagnosis Date    Depression 2015     Lexapro and Wellbrutrin for about 8months.    Infertility     Migraine headache      for 4 months.before pregnancy at beginning of cyles    Neurological abnormality      States she was diagnosed with Chiari malformation when being evaluated for migraines. Was told it is benign.     Past Surgical History   Procedure Laterality Date    Breast reduction      Laparscopic surgery       endometriosis    Breast surgery       reduction      Family History   Problem Relation Age of Onset    Prostate cancer Father     Breast cancer Mother 62     negative genetic testing     Colon cancer Maternal Aunt      Social History   Substance Use Topics    Smoking status: Never Smoker    Smokeless tobacco: Never Used    Alcohol use No       Review of Systems  ROS WNL for labor     OBJECTIVE:     Vital Signs (Most Recent):       Physical Exam:  General:  alert and normal appearing gravid female   Skin:  Skin color, texture, turgor normal. No rashes or lesions   HEENT:  conjunctivae/corneas clear. PERRL.   Lungs:  clear to auscultation bilaterally   Heart:  regular rate and rhythm, S1, S2 normal, no murmur, click, rub or gallop   Breasts:  no discharge, erythema, or tenderness   Abdomen:  gravid   Uterine Size:   size equals dates   Presentations:  cephalic   FHT:  135 BPM, mod simeon, + accel   Pelvis: adequate by palpation   Cervix:     Dilation: 4cm    Effacement: 90%    Station:  -2    Consistency: soft    Position: posterior     Laboratory:  Lab Results   Component Value Date    GROUPTRH A POS 2016    HEPBSAG Negative 2016        Diagnostic Results:  none ordered    ASSESSMENT/PLAN:     39w0d gestation.  Active labor     Conditions: anxiety, history of PPD     Risks, benefits, alternatives and possible complications have been discussed in detail with the patient.  Pre-admission, admission, and post admission procedures and expectations were discussed in detail.  All questions answered, all appropriate consents will be signed at the Hospital. Admission is planned for today.   Expectant management. and Anticipate vaginal delivery.  Admit to ABC unit.   ~ jose luis miller cnm

## 2017-01-29 NOTE — PROGRESS NOTES
Pt in birthing tub with VSS. Pt contractions every 2-3 minutes. FHT's verified by JOAN Madrid in the 140's at this time.

## 2017-01-29 NOTE — IP AVS SNAPSHOT
[facility] Location ([facility])  [address] [place], [place] [address]  Phone: [phone]           Patient Discharge Instructions     Our goal is to set you up for success. This packet includes information on your condition, medications, and your home care. It will help you to care for yourself so you don't get sicker and need to go back to the hospital.     Please ask your nurse if you have any questions.        There are many details to remember when preparing to leave the hospital. Here is what you will need to do:    1. Take your medicine. If you are prescribed medications, review your Medication List in the following pages. You may have new medications to  at the pharmacy and others that you'll need to stop taking. Review the instructions for how and when to take your medications. Talk with your doctor or nurses if you are unsure of what to do.     2. Go to your follow-up appointments. Specific follow-up information is listed in the following pages. Your may be contacted by a transition nurse or clinical provider about future appointments. Be sure we have all of the phone numbers to reach you, if needed. Please contact your provider's office if you are unable to make an appointment.     3. Watch for warning signs. Your doctor or nurse will give you detailed warning signs to watch for and when to call for assistance. These instructions may also include educational information about your condition. If you experience any of warning signs to your health, call your doctor.               [facility]  Unless otherwise directed by your provider, please contact [facility], our nurse care line that is available for 24/7 assistance.     [phone] (toll-free)    Registered nurses in the [facility] provide clinical advisement, health education, appointment booking, and other advisory services.                    ** Verify the list of medication(s) below is accurate and up to  date. Carry this with you in case of emergency. If your medications have changed, please notify your healthcare provider.             Medication List      Notice     You have not been prescribed any medications.               Please bring to all follow up appointments:    1. A copy of your discharge instructions.  2. All medicines you are currently taking in their original bottles.  3. Identification and insurance card.    Please arrive 15 minutes ahead of scheduled appointment time.    Please call 24 hours in advance if you must reschedule your appointment and/or time.        Follow-up Information     Follow up In 6 weeks.    Why:  PP check        Discharge Instructions     Future Orders    Activity as tolerated     Call MD for:  difficulty breathing or increased cough     Call MD for:  increased confusion or weakness     Call MD for:  persistent dizziness, light-headedness, or visual disturbances     Call MD for:  persistent nausea and vomiting or diarrhea     Call MD for:  redness, tenderness, or signs of infection (pain, swelling, redness, odor or green/yellow discharge around incision site)     Call MD for:  severe persistent headache     Call MD for:  severe uncontrolled pain     Call MD for:  temperature >100.4     Call MD for:  worsening rash     Diet general     Questions:    Total calories:      Fat restriction, if any:      Protein restriction, if any:      Na restriction, if any:      Fluid restriction:      Additional restrictions:          Discharge Instructions       Breastfeeding Discharge Instructions       Feed the baby at the earliest sign of hunger or comfort  o Hands to mouth, sucking motions  o Rooting or searching for something to suck on  o Dont wait for crying - it is a late sign of hunger and comfort.     The feedings may be 8-12 times per 24hrs and will not follow a schedule   Avoid pacifiers and bottles for the first 4 weeks   Alternate the breast you start the feeding with, or start  with the breast that feels the fullest   Switch breasts when the baby takes himself off the breast or falls asleep   Keep offering breasts until the baby looks full, no longer gives hunger signs, and stays asleep when placed on his back in the crib   If the baby is sleepy and wont wake for a feeding, put the baby skin-to-skin dressed in a diaper against the mothers bare chest   Sleep near your baby   The baby should be positioned and latched on to the breast correctly  o Chest-to-chest, chin in the breast  o Babys lips are flipped outward  o Babys mouth is stretched open wide like a shout  o Babys sucking should feel like tugging to the mother  - The baby should be drinking at the breast:  o You should hear swallowing or gulping throughout the feeding  o You should see milk on the babys lips when he comes off the breast  o Your breasts should be softer when the baby is finished feeding  o The baby should look relaxed at the end of feedings  o After the 4th day and your milk is in:  o The babys poop should turn bright yellow and be loose, watery, and seedy  o The baby should have at least 3-4 poops the size of the palm of your hand per day  o The baby should have at least 6-8 wet diapers per day  o The urine should be light yellow in color  You should drink when you are thirsty and eat a healthy diet when you are    hungry.     Take naps to get the rest you need.   Take medications and/or drink alcohol only with permission of your obstetrician    or the babys pediatrician.  You can also call the Infant Risk Center,   ([phone]), Monday-Friday, 8am-5pm Central time, to get the most   up-to-date evidence-based information on the use of medications during   pregnancy and breastfeeding.      The baby should be examined by a pediatrician at 3-5 days of age.  Once your   milk comes in, the baby should be gaining at least ½ - 1oz each day and should be back to birthweight no later than 10-14 days of  "age.          Community Resources    [facility] Breastfeeding Warmline:  [phone]  Local [facility] clinics: provide incentives and breastpumps to eligible mothers  La Leche League International (LLLI):  mother-to-mother support group website        www.lll.org  Local La Leche Leantonio mother-to-mother support groups:        www.llavtarWhistle.[employer] (WCG)        La Leche League [facility]         www.savannah@InfiKno.com  Dr. Anmol Srinivasan website for latch videos and general information:        www.breastfeedinginc.ca  Infant Risk Center is a call center that provides information about the safety of taking medications while breastfeeding.  Call [phone], M-F, 8am-5pm, CT.  International Lactation Consultant Association provides resources for assistance:        www.ilca.org  [facility] Breastfeeding Coalition provides informationand resources for parents  and the community    http://[facility] Centerastfeeding.org     Ariella mom provides resources for assistance:        www.nolamom.org  [employer] for Healthy Babies:  7-531-476-BABY(1531)  [employer] provides a list of breastfeeding services by zip code:        www.PebbleVertra.org  Cafe [employer] Lait:  476.228.6667 a breastfeeding support group for women of color          Primary Diagnosis     Your primary diagnosis was:  Normal Vaginal Delivery      Admission Information     Date & Time Provider Department CSN    1/29/2017  2:26 PM Laurie Spear MD [facility] [phone]      Care Providers     Provider Role Specialty Primary office phone    Laurie Spear MD Attending Provider Obstetrics [phone]    Samaria Brody MD Consulting Physician  Obstetrics and Gynecology [phone]      Your Vitals Were     BP Pulse Temp Resp Height Weight    126/71 93 97.8 °F (36.6 °C) (Oral) 18 5' 8" (1.727 m) 71.2 kg (157 lb)    Last Period SpO2 BMI          05/01/2016 97% 23.87 kg/m2        Recent Lab Values     No lab values to display.      Allergies " as of 1/31/2017        Reactions    Augmentin [Amoxicillin-pot Clavulanate] Nausea And Vomiting      Advance Directives     An advance directive is a document which, in the event you are no longer able to make decisions for yourself, tells your healthcare team what kind of treatment you do or do not want to receive, or who you would like to make those decisions for you.  If you do not currently have an advance directive, [facility] encourages you to create one.  For more information call:  [phone]), [phone] ([phone]),  or log on to [email or web address].        Language Assistance Services     ATTENTION: Language assistance services are available, free of charge. Please call [phone].      ATENCIÓN: Si habla radha, tiene a joy disposición servicios gratuitos de asistencia lingüística. Llame al [phone].     CHÚ Ý: N?u b?n nói Ti?ng Vi?t, có các d?ch v? h? tr? ngôn ng? mi?n phí dành cho b?n. G?i s? [phone].         [facility] complies with applicable Federal civil rights laws and does not discriminate on the basis of race, color, national origin, age, disability, or sex.

## 2017-01-30 PROBLEM — O26.893 PREGNANCY HEADACHE IN THIRD TRIMESTER: Status: RESOLVED | Noted: 2017-01-03 | Resolved: 2017-01-30

## 2017-01-30 PROBLEM — Z37.9 NORMAL LABOR: Status: RESOLVED | Noted: 2017-01-29 | Resolved: 2017-01-30

## 2017-01-30 PROBLEM — R51.9 PREGNANCY HEADACHE IN THIRD TRIMESTER: Status: RESOLVED | Noted: 2017-01-03 | Resolved: 2017-01-30

## 2017-01-30 PROCEDURE — 72200004 HC VAGINAL DELIVERY LEVEL I

## 2017-01-30 PROCEDURE — 51702 INSERT TEMP BLADDER CATH: CPT

## 2017-01-30 PROCEDURE — 59400 OBSTETRICAL CARE: CPT | Mod: ,,, | Performed by: ADVANCED PRACTICE MIDWIFE

## 2017-01-30 PROCEDURE — 11000001 HC ACUTE MED/SURG PRIVATE ROOM

## 2017-01-30 PROCEDURE — 25000003 PHARM REV CODE 250: Performed by: ADVANCED PRACTICE MIDWIFE

## 2017-01-30 RX ORDER — IBUPROFEN 600 MG/1
600 TABLET ORAL EVERY 6 HOURS
Status: DISCONTINUED | OUTPATIENT
Start: 2017-01-30 | End: 2017-01-31 | Stop reason: HOSPADM

## 2017-01-30 RX ORDER — DIPHENHYDRAMINE HYDROCHLORIDE 50 MG/ML
25 INJECTION INTRAMUSCULAR; INTRAVENOUS EVERY 4 HOURS PRN
Status: DISCONTINUED | OUTPATIENT
Start: 2017-01-31 | End: 2017-01-31 | Stop reason: HOSPADM

## 2017-01-30 RX ORDER — ONDANSETRON 8 MG/1
8 TABLET, ORALLY DISINTEGRATING ORAL EVERY 8 HOURS PRN
Status: DISCONTINUED | OUTPATIENT
Start: 2017-01-30 | End: 2017-01-31 | Stop reason: HOSPADM

## 2017-01-30 RX ORDER — DOCUSATE SODIUM 100 MG/1
100 CAPSULE, LIQUID FILLED ORAL 2 TIMES DAILY PRN
Status: DISCONTINUED | OUTPATIENT
Start: 2017-01-30 | End: 2017-01-31 | Stop reason: HOSPADM

## 2017-01-30 RX ORDER — OXYCODONE AND ACETAMINOPHEN 10; 325 MG/1; MG/1
1 TABLET ORAL EVERY 4 HOURS PRN
Status: DISCONTINUED | OUTPATIENT
Start: 2017-01-30 | End: 2017-01-31 | Stop reason: HOSPADM

## 2017-01-30 RX ORDER — OXYCODONE AND ACETAMINOPHEN 5; 325 MG/1; MG/1
1 TABLET ORAL EVERY 4 HOURS PRN
Status: DISCONTINUED | OUTPATIENT
Start: 2017-01-30 | End: 2017-01-31 | Stop reason: HOSPADM

## 2017-01-30 RX ORDER — ACETAMINOPHEN 325 MG/1
650 TABLET ORAL EVERY 6 HOURS PRN
Status: DISCONTINUED | OUTPATIENT
Start: 2017-01-31 | End: 2017-01-31 | Stop reason: HOSPADM

## 2017-01-30 RX ORDER — HYDROCORTISONE 25 MG/G
CREAM TOPICAL 3 TIMES DAILY PRN
Status: DISCONTINUED | OUTPATIENT
Start: 2017-01-30 | End: 2017-01-31 | Stop reason: HOSPADM

## 2017-01-30 RX ADMIN — DOCUSATE SODIUM 100 MG: 100 CAPSULE, LIQUID FILLED ORAL at 09:01

## 2017-01-30 RX ADMIN — OXYCODONE HYDROCHLORIDE AND ACETAMINOPHEN 1 TABLET: 5; 325 TABLET ORAL at 07:01

## 2017-01-30 RX ADMIN — OXYCODONE HYDROCHLORIDE AND ACETAMINOPHEN 1 TABLET: 5; 325 TABLET ORAL at 09:01

## 2017-01-30 RX ADMIN — OXYCODONE HYDROCHLORIDE AND ACETAMINOPHEN 1 TABLET: 5; 325 TABLET ORAL at 01:01

## 2017-01-30 RX ADMIN — IBUPROFEN 600 MG: 600 TABLET, FILM COATED ORAL at 12:01

## 2017-01-30 RX ADMIN — IBUPROFEN 600 MG: 600 TABLET, FILM COATED ORAL at 05:01

## 2017-01-30 RX ADMIN — IBUPROFEN 600 MG: 600 TABLET, FILM COATED ORAL at 11:01

## 2017-01-30 NOTE — PROGRESS NOTES
Zandra Sanchez is a 31 y.o. female  at 39.0wks      Subjective:   Pt resting comfortably with epidural in place.      Objective:  VSS - 98.5F P83 R18 106/64 @ 18:30  FHTs - 120bpm, mod simeon, + accels, occassional early decels.  VE: 7/90%/-1, soft, mid-position @ 22:15  6/100%/-2, soft, posterior @ 20:45  6/100%/-2, soft, posterior @17:50  4/90%/-2, soft, posterior @ 14:30  1/80%/-3, soft, very posterior @ 09:40  Ctx's q. 3 minutes x 60 sec  Positions: sitting in tub with good labor support from .      Assessment:   Stable SIUP in active labor  Slow cervical change      Plan:   1. Will begin pitocin augmentation  2. Epidural obtained.  3. Anticipate NSVB  ~ k maria g miller

## 2017-01-30 NOTE — PROGRESS NOTES
"Zandra Sanchez is a 31 y.o. female patient.    Active Hospital Problems    Diagnosis  POA    * (normal spontaneous vaginal delivery) [O80]  Not Applicable      Resolved Hospital Problems    Diagnosis Date Resolved POA    Normal labor [O80, Z37.9] 2017 Not Applicable     Temp: 98.7 °F (37.1 °C) (17 0842)  Pulse: 71 (17 0842)  Resp: 18 (17 0842)  BP: 113/72 (17 0842)  SpO2: 97 % (17 0842)  Weight: 71.2 kg (157 lb) (17 1709)  Height: 5' 8" (172.7 cm) (17 170)    Subjective   Feeling well  No discomfort or c/o's  Breastfeeding well  States voiding normally since gaspar dc's  Objective:  General Appearance:  Comfortable.    Vital signs: (most recent): Blood pressure 113/72, pulse 71, temperature 98.7 °F (37.1 °C), temperature source Oral, resp. rate 18, height 5' 8" (1.727 m), weight 71.2 kg (157 lb), last menstrual period 2016, SpO2 97 %, unknown if currently breastfeeding.  Vital signs are normal.  No fever.    Output: Producing urine.    nipples intact  Lochia small  Assessment & Plan  Postpartum day 1  Plan d/c home tomorrow  Amada Turner CNM  2017    "

## 2017-01-30 NOTE — PROGRESS NOTES
Zandra Sanchez is a 31 y.o. female  at 39.0wks    Subjective:   Pt describes very strong contractions, silently breathing through them.     Objective:  VSS - 98.4F R79 R18 112/56  FHTs - 140s, per intermittent ascultation  VE: 6/100%/-2, soft, posterior @17:50  4/90%/-2, soft, posterior @ 14:30  1/80%/-3, soft, very posterior @ 09:40  Ctx's q. 3 minutes x 60 sec  Pt coping well with strong contractions.  Positions: sitting in tub with good labor support from .    Assessment:   Stable SIUP in active labor    Plan:   1. Continue to monitor maternal/fetal status with vital signs and observation.  2. Anticipate NSVB.   ~ jose luis miller cnm

## 2017-01-30 NOTE — L&D DELIVERY NOTE
Controlled NSVB of live male , delivered with directed pushing for fetal bradycardia. Spontaneous cry and vigorous . Placenta delivered, spontaneous and complete w/ 3VC. Hemostasis observed. Inspection revealed first degree perineal laceration, repaired with 3.0 chromic under epidural anesthesia. Hemostasis observed. Baby to breast for breastfeeding. Anticipate uncomplicated postpartum course. Motherbaby stable.   ~ k angela, maria g       Delivery Information for  Timothy Sanchez    Birth information:  YOB: 2019   Time of birth: 11:02 PM   Sex: female   Head Delivery Date/Time: 2017 11:02 PM   Delivery type: Vaginal, Spontaneous Delivery   Gestational Age: 39w0d    Delivery Providers    Delivering clinician:  ALLAN WHITTEN   Other personnel:   Provider Role   GEOVANI CRUZ Registered Nurse   JOSE R KIM Charge Nurse   CHUN GAGNON Surgical Tech   ECTOR CERVANTES Registered Nurse   LEIGHANN MAURER Registered Nurse                  [place] Measurements    Weight:  2900 g Length:  48.3 cm   Head circum.:  33.7 cm Chest circum.:  33 cm           Assessment    Living status:  Yes   Apgars    1 Minute:   5 Minute:   10 Minute 15 Minute 20 Minute   Skin Color: 0  1       Heart Rate: 2  2       Reflex Irritability: 2  2       Muscle Tone: 2  2       Respiratory Effort: 2  2       Total: 8  9                  Apgars Assigned By:  PARVIN MAURER RN          Assisted Delivery Details:    Forceps attempted?:  No   Vacuum extractor attempted?:  No             Shoulder Dystocia    Shoulder dystocia present?:  No                                             Presentation and Position    Presentation: Vertex   Position:     Occiput    Anterior               Interventions/Resuscitation    Method:  None        Cord    Vessels:  3 vessels   Complications:  None   Delayed Cord Clamping?:  Yes   Cord Clamped Date/Time:  2017 11:33 PM   Cord Blood Disposition:  Sent with Baby   Gases  Sent?:  No   Stem Cell Collection (by MD):  No        Placenta    Date and time:  2017 11:12 PM   Removal:  Spontaneous   Appearance:  Intact   Placenta disposition:  Family            Labor Events:       labor: No     Labor Onset Date/Time: 2017 14:30     Dilation Complete Date/Time: 2017 22:50     Start Pushing Date/Time: 2017 22:52     Rupture Date/Time:              Rupture type: bulging         Fluid Amount: moderate      Fluid Color: clear (Arom - k angela)      Fluid Odor: none      Membrane Status (PeriCalm):        Rupture Date/Time (PeriCalm):        Fluid Amount (PeriCalm):        Fluid Color (PeriCalm):         steroids: None     Antibiotics given for GBS: No     Induction: none     Indications for induction:        Augmentation: amniotomy     Indications for augmentation:       Labor complications: None     Additional complications:          Cervical ripening:                     Delivery:      Episiotomy: None     Indication for Episiotomy:       Perineal Lacerations: 1st Repaired:  Yes   Periurethral Laceration: none Repaired:     Labial Laceration: none Repaired:     Sulcus Laceration: none Repaired:     Vaginal Laceration: No Repaired:     Cervical Laceration: No Repaired:     Repair suture:       Repair # of packets: 1     Blood loss (ml):       Vaginal Sweep Performed: No     Surgicount Correct: No       Other providers:       Anesthesia    Method:  Epidural              Details (if applicable):  Trial of Labor      Categorization:      Priority:     Indications for :     Incision Type:       Additional  information:  Forceps:    Vacuum:    Breech:    Observed anomalies    Other (Comments):

## 2017-01-30 NOTE — ANESTHESIA PREPROCEDURE EVALUATION
2017  Zandra Sanchez is a 31 y.o. female  with PMHx significant for chronic HA and Chiari malformation (diagnosed by neurosurgeon relative, no imaging in Epic) who presents in active labor.  Patient was initially admitted to [facility] where she had planned to delivery naturally but would now prefer an epidural.  Patient denies complications with this pregnancy.  Denies use of anticoagulants, prior anesthetic problems, or any significant cardiopulmonary disease.          OB History    Para Term  AB SAB TAB Ectopic Multiple Living   2 1 1      1 2      # Outcome Date GA Lbr Cooper/2nd Weight Sex Delivery Anes PTL Lv   2 Current            1A Term 14 37w0d / 00:40 2.466 kg (5 lb 7 oz) F Vag-Spont EPI N Y   1B Term 14 37w0d / 00:45 2.466 kg (5 lb 7 oz) F Vag-Spont EPI N Y          Wt Readings from Last 1 Encounters:   17 1709 71.2 kg (157 lb)       BP Readings from Last 3 Encounters:   17 106/64   17 115/76   17 108/64       Patient Active Problem List   Diagnosis    Anxiety    Supervision of normal pregnancy    Gastrointestinal discomfort    At risk for depressed mood during postpartum period    Prenatal care in third trimester    Pregnancy headache in third trimester    Acute non-recurrent pansinusitis    Headache    Chiari I malformation    Normal labor       Past Surgical History   Procedure Laterality Date    Breast reduction      Laparscopic surgery       endometriosis    Breast surgery       reduction        Social History     Social History    Marital status:      Spouse name: Clay    Number of children: N/A    Years of education: N/A     Occupational History          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    Daughters: Swapna Ferro, IOL for twins at 37 weeks, epidural at 7cm, NSVB             Chemistry        Component Value Date/Time     (L) 01/02/2017 1059    K 3.9 01/02/2017 1059     01/02/2017 1059    CO2 20 (L) 01/02/2017 1059    BUN 6 01/02/2017 1059    CREATININE 0.7 01/02/2017 1059    GLU 99 01/02/2017 1059        Component Value Date/Time    CALCIUM 8.7 01/02/2017 1059    ALKPHOS 201 (H) 01/02/2017 1059    AST 8 (L) 01/02/2017 1059    ALT 8 (L) 01/02/2017 1059    BILITOT 0.3 01/02/2017 1059            Lab Results   Component Value Date    WBC 10.40 01/02/2017    HGB 11.5 (L) 01/02/2017    HCT 33.4 (L) 01/02/2017    MCV 85 01/02/2017     01/02/2017       No results for input(s): INR, PROTIME, APTT in the last 72 hours.    Invalid input(s): PT        [facility] Anesthesia Evaluation    I have reviewed the Patient Summary Reports.    I have reviewed the Nursing Notes.   I have reviewed the Medications.     Review of Systems  Anesthesia Hx:  Denies Family Hx of Anesthesia complications.   Denies Personal Hx of Anesthesia complications.   Cardiovascular:   Denies Hypertension.  Denies Dysrhythmias.   Denies Angina.    Pulmonary:   Denies COPD.  Denies Asthma.    Renal/:   Denies Chronic Renal Disease.     Hepatic/GI:   Denies GERD. Denies Liver Disease.    Neurological:   Headaches (chronic, intractable) Patient also carries the diagnosis of Chiari malformation. Had epidural for previous pregnancy.   Endocrine:   Denies Diabetes.    Psych:   depression          Physical Exam  General:  Well nourished    Airway/Jaw/Neck:  Airway  Findings: Mouth Opening: Normal Tongue: Normal  General Airway Assessment: Adult  Mallampati: III  Improves to II with phonation.  TM Distance: Normal, at least 6 cm  Jaw/Neck Findings:  Neck ROM: Normal ROM      Dental:  Dental Findings: In tact   Chest/Lungs:  Chest/Lungs Findings: Clear to auscultation, Normal Respiratory Rate     Heart/Vascular:  Heart Findings: Rate: Normal  Rhythm: Regular Rhythm        Mental Status:  Mental Status Findings:  Cooperative, Alert and Oriented         Anesthesia Plan  Type of Anesthesia, risks & benefits discussed:  Anesthesia Type:  CSE, epidural, general, spinal  Patient's Preference:   Intra-op Monitoring Plan:   Intra-op Monitoring Plan Comments:   Post Op Pain Control Plan:   Post Op Pain Control Plan Comments:   Induction:    Beta Blocker:  Patient is not currently on a Beta-Blocker (No further documentation required).       Informed Consent: Patient understands risks and agrees with Anesthesia plan.  Questions answered. Anesthesia consent signed with patient.  ASA Score: 2     Day of Surgery Review of History & Physical:    H&P update referred to the provider.         Ready For Surgery From Anesthesia Perspective.

## 2017-01-30 NOTE — PROGRESS NOTES
Zandra Sanchez is a 31 y.o. female  at 39.0wks     Subjective:   Pt describes very strong contractions, requesting an exam so she can make decisions about pain management.     Objective:  VSS - 98.5F P83 R18 106/64 @ 18:30  FHTs - 120bpm, mod simeon, + accels, occassional early decels.  VE: 6/100%/-2, soft, posterior @ 20:45  6/100%/-2, soft, posterior @17:50  4/90%/-2, soft, posterior @ 14:30  1/80%/-3, soft, very posterior @ 09:40  Ctx's q. 3 minutes x 60 sec  Positions: sitting in tub with good labor support from .     Assessment:   Stable SIUP in active labor  Desires epidural  Slow cervical change     Plan:   1. Transferred to L&D  2. Epidural obtained.  3. Pitocin augmentation when pain management is achieved.  4. Anticipate NSVB  ~ k maria g miller

## 2017-01-30 NOTE — PROGRESS NOTES
CNM Rosamaria Paxton called to obtain gaspar removal time. Instructed to let gaspar remain in 12-24 hours and that need for gaspar will be reassessed later today.

## 2017-01-31 VITALS
TEMPERATURE: 98 F | WEIGHT: 157 LBS | HEART RATE: 93 BPM | SYSTOLIC BLOOD PRESSURE: 126 MMHG | BODY MASS INDEX: 23.79 KG/M2 | RESPIRATION RATE: 18 BRPM | DIASTOLIC BLOOD PRESSURE: 71 MMHG | OXYGEN SATURATION: 97 % | HEIGHT: 68 IN

## 2017-01-31 PROCEDURE — 25000003 PHARM REV CODE 250: Performed by: ADVANCED PRACTICE MIDWIFE

## 2017-01-31 RX ADMIN — IBUPROFEN 600 MG: 600 TABLET, FILM COATED ORAL at 06:01

## 2017-01-31 RX ADMIN — DOCUSATE SODIUM 100 MG: 100 CAPSULE, LIQUID FILLED ORAL at 08:01

## 2017-01-31 NOTE — PLAN OF CARE
Problem: Patient Care Overview  Goal: Plan of Care Review  Outcome: Ongoing (interventions implemented as appropriate)  With patient's permission observed baby latched to right breast; developed the following breastfeeding plan of care with patient: she will breastfeed baby on cue until content at least 8 times in 24 hours observing for signs of milk transfer; she will wake baby prn; she will avoid bottles, formula and pacifiers;

## 2017-01-31 NOTE — DISCHARGE SUMMARY
[facility]  Delivery Discharge Summary  Obstetrics    Admit Date: 2017    Discharge Date and Time: 2017 9:12 AM    Primary OB Clinician: WENDI Stevens    Attending Physician: Laurie Spear MD     Discharge Provider: Tyesha Hinds    Reason for Admission:     Estimated Date of Delivery: 17     Conditions: N/A    Procedures Performed: * No surgery found *    Hospital Course (synopsis of major diagnoses, care, treatment, and services provided during the course of the hospital stay):    Zandra Sanchez is a 31 y.o. now , PPD #2 who was admitted on 2017  for normal spontaneous vaginal delivery. On initial assessment, vital signs were stable and physical exam was normal. Infant was in cephalic presentation. Patient was subsequently admitted to labor and delivery unit with signed consents. Labor course was managed with epidural.  Patient delivered a single viable  female. Pt was in stable condition post-delivery and was transferred to the Mother-Baby Unit. Her postpartum course was uncomplicated. On discharge day, patient's pain is controlled with oral pain medications. Patient is tolerating PO without nausea or vomiting, ambulating, voiding. She denies SOB and CP. Denies any HA, vision changes, F/C, LE swelling. Denies any breast pain/soreness.     Delivery:    Episiotomy: None   Lacerations: 1st   Repair suture:     Repair # of packets: 1   Blood loss (ml): 250     Birth information:  YOB: 2019   Time of birth: 11:02 PM   Sex: female   Delivery type: Vaginal, Spontaneous Delivery   Gestational Age: 39w0d    Delivery Clinician:      Other providers:       Additional  information:  Forceps:    Vacuum:    Breech:    Observed anomalies      Living?:           APGARS  One minute Five minutes Ten minutes   Skin color:         Heart rate:         Grimace:         Muscle tone:         Breathing:         Totals: 8  9        Placenta:  Delivered:       appearance    Tubal Ligation: n/a    Feeding Method: breast    Rh Immune Globulin Given: no    Rubella Vaccine Given: no    Tdap Vaccine Given: no    Consults: none    Significant Diagnostic Studies: Labs: All labs within the past 24 hours have been reviewed    Final Diagnoses:   Principal Problem:  (normal spontaneous vaginal delivery)   Secondary Diagnoses:   Active Hospital Problems    Diagnosis  POA    * (normal spontaneous vaginal delivery) [O80]  Not Applicable      Resolved Hospital Problems    Diagnosis Date Resolved POA    Normal labor [O80, Z37.9] 2017 Not Applicable       Discharged Condition: good    Disposition: Home or Self Care    Follow Up/Patient Instructions:     Medications:  Reconciled Home Medications: There are no discharge medications for this patient.      Discharge Procedure Orders  Diet general     Activity as tolerated     Call MD for:  increased confusion or weakness     Call MD for:  persistent dizziness, light-headedness, or visual disturbances     Call MD for:  worsening rash     Call MD for:  difficulty breathing or increased cough     Call MD for:  severe persistent headache     Call MD for:  redness, tenderness, or signs of infection (pain, swelling, redness, odor or green/yellow discharge around incision site)     Call MD for:  severe uncontrolled pain     Call MD for:  persistent nausea and vomiting or diarrhea     Call MD for:  temperature >100.4       Follow-up Information     Follow up In 6 weeks.    Why:  PP check

## 2017-01-31 NOTE — PROGRESS NOTES
[facility]  Vaginal Delivery Progress Note  Obstetrics    SUBJECTIVE:     Zandra Sanchez is a 31 y.o. female PPD #2 status post Spontaneous vaginal delivery at 39w0d in a pregnancy complicated by N/A. Patient is doing well this morning. She denies nausea, vomiting, fever or chills. Patient reports mild abdominal pain that is well relieved by oral pain medications. Lochia is mild and decreasing. Patient is voiding without difficulty and ambulating with no difficulty. She has passed flatus and has had a BM. Patient does plan to breast feed.  for contraception. She N/Adesires circumcision.    OBJECTIVE:     Vital Signs Ranges:  Temp:  [97.4 °F (36.3 °C)-97.9 °F (36.6 °C)] 97.8 °F (36.6 °C)  Pulse:  [61-93] 93  Resp:  [18] 18  SpO2:  [97 %-99 %] 97 %  BP: (104-136)/(71-77) 126/71    I/O (Last 24H):  No intake or output data in the 24 hours ending 17 0904    Physical Exam:  General:    alert, appears stated age and cooperative   Lungs:  clear to auscultation bilaterally and normal percussion bilaterally   Heart:  regular rate and rhythm, S1, S2 normal, no murmur, click, rub or gallop and normal apical impulse   Abdomen:  normal findings: soft, non-tender   Uterine Size:  firm located 2 FB below  the umbilicus.   Incision:  N/A   Extremities:   peripheral pulses normal, no pedal edema, no clubbing or cyanosis, no pedal edema noted     [facility]   [facility]    ASSESSMENT:     Assessment:  Active Hospital Problems    Diagnosis    * (normal spontaneous vaginal delivery)      PLAN:     Plan:  1. Postpartum care:   - Patient doing well. Continue routine management and advances.   - Continue PO pain meds. Pain well controlled.   - Post P: H/h   - Encourage ambulation   - Circumcision N/A   - Contraception declines   - Lactation breastfeeding      2. PP Day 2 stable    Disposition: As patient meets milestones, will plan to discharge home today.

## 2017-01-31 NOTE — PLAN OF CARE
Problem: Patient Care Overview  Goal: Plan of Care Review  Outcome: Ongoing (interventions implemented as appropriate)  Pt ambulating and voiding without difficulty. Patient safety maintained, side rails up, bed low and locked position.  Pain well controlled with PRN pain medication. Fundus midline, firm, with moderate lochia. VSS. Significant other at bedside; parents responding to infant cues and bonding appropriately. Will continue to monitor.

## 2017-01-31 NOTE — LACTATION NOTE
With patient's permission observed baby breastfeeding, actively sucking with wide mouth pauses and  until content, self detaching; patient using breast compression and infant stimulation to facilitate colostrum transfer; patient offered second breast but baby sleepy and did not latch; discussed patient's history of breast reduction surgery 10 years ago; advised to provide breast stimulation pc by hand expression or use breastpump; patient expressed interest in using breastpump; she was preparing to rest at this time; breastpump brought to her room; requested she ask her RN for assistance with breastpump pc;

## 2017-01-31 NOTE — DISCHARGE INSTRUCTIONS
Breastfeeding Discharge Instructions       Feed the baby at the earliest sign of hunger or comfort  o Hands to mouth, sucking motions  o Rooting or searching for something to suck on  o Dont wait for crying - it is a late sign of hunger and comfort.     The feedings may be 8-12 times per 24hrs and will not follow a schedule   Avoid pacifiers and bottles for the first 4 weeks   Alternate the breast you start the feeding with, or start with the breast that feels the fullest   Switch breasts when the baby takes himself off the breast or falls asleep   Keep offering breasts until the baby looks full, no longer gives hunger signs, and stays asleep when placed on his back in the crib   If the baby is sleepy and wont wake for a feeding, put the baby skin-to-skin dressed in a diaper against the mothers bare chest   Sleep near your baby   The baby should be positioned and latched on to the breast correctly  o Chest-to-chest, chin in the breast  o Babys lips are flipped outward  o Babys mouth is stretched open wide like a shout  o Babys sucking should feel like tugging to the mother  - The baby should be drinking at the breast:  o You should hear swallowing or gulping throughout the feeding  o You should see milk on the babys lips when he comes off the breast  o Your breasts should be softer when the baby is finished feeding  o The baby should look relaxed at the end of feedings  o After the 4th day and your milk is in:  o The babys poop should turn bright yellow and be loose, watery, and seedy  o The baby should have at least 3-4 poops the size of the palm of your hand per day  o The baby should have at least 6-8 wet diapers per day  o The urine should be light yellow in color  You should drink when you are thirsty and eat a healthy diet when you are    hungry.     Take naps to get the rest you need.   Take medications and/or drink alcohol only with permission of your obstetrician    or the babys  pediatrician.  You can also call the Infant Risk Center,   ([phone]), Monday-Friday, 8am-5pm Central time, to get the most   up-to-date evidence-based information on the use of medications during   pregnancy and breastfeeding.      The baby should be examined by a pediatrician at 3-5 days of age.  Once your   milk comes in, the baby should be gaining at least ½ - 1oz each day and should be back to birthweight no later than 10-14 days of age.          Community Resources    [facility] Breastfeeding Warmline:  [phone]  Local [facility] clinics: provide incentives and breastpumps to eligible mothers  La Leche League International (LLLI):  mother-to-mother support group website        www.iFLYER.ImagineOptix  Local La Leche League mother-to-mother support groups:        www.LookUP.Best Bid        La Leche League [facility]         www.savannah@Berrybenka.com  Dr. Anmol Srinivasan website for latch videos and general information:        www.breastfeedinginc.ca  Infant Risk Center is a call center that provides information about the safety of taking medications while breastfeeding.  Call 5-125-671-8060, M-F, 8am-5pm, CT.  International Lactation Consultant Association provides resources for assistance:        www.ilca.org  [place] Breastfeeding Coalition provides informationand resources for parents  and the community    http://l[place]Carebreastfeeding.org     Ariella mom provides resources for assistance:        www.nolamom.org  [employer] for Healthy Babies:  3-721-170-BABY(6893)  [facility] provides a list of breastfeeding services by zip code:        www.[facility] CenterKinder[facility] Robotics.ImagineOptix  Cafe au Lait:  806.546.8004 a breastfeeding support group for women of color

## 2017-02-09 ENCOUNTER — ANESTHESIA EVENT (OUTPATIENT)
Dept: EMERGENCY MEDICINE | Facility: OTHER | Age: 32
End: 2017-02-09

## 2017-02-09 ENCOUNTER — TELEPHONE (OUTPATIENT)
Dept: OBSTETRICS AND GYNECOLOGY | Facility: CLINIC | Age: 32
End: 2017-02-09

## 2017-02-09 ENCOUNTER — HOSPITAL ENCOUNTER (INPATIENT)
Facility: OTHER | Age: 32
LOS: 1 days | Discharge: HOME | DRG: 606 | End: 2017-02-10
Attending: OBSTETRICS & GYNECOLOGY | Admitting: HOSPITALIST
Payer: COMMERCIAL

## 2017-02-09 ENCOUNTER — ANESTHESIA (OUTPATIENT)
Dept: EMERGENCY MEDICINE | Facility: OTHER | Age: 32
End: 2017-02-09

## 2017-02-09 DIAGNOSIS — T78.40XA ALLERGIC REACTION CAUSED BY A DRUG, INITIAL ENCOUNTER: Primary | ICD-10-CM

## 2017-02-09 DIAGNOSIS — J01.10 SUBACUTE FRONTAL SINUSITIS: Primary | ICD-10-CM

## 2017-02-09 LAB
ALBUMIN SERPL BCP-MCNC: 2.9 G/DL
ALP SERPL-CCNC: 89 U/L
ALT SERPL W/O P-5'-P-CCNC: 14 U/L
ANION GAP SERPL CALC-SCNC: 6 MMOL/L
AST SERPL-CCNC: 10 U/L
BASOPHILS # BLD AUTO: 0.01 K/UL
BASOPHILS NFR BLD: 0.1 %
BILIRUB SERPL-MCNC: 0.2 MG/DL
BUN SERPL-MCNC: 13 MG/DL
CALCIUM SERPL-MCNC: 8.3 MG/DL
CHLORIDE SERPL-SCNC: 107 MMOL/L
CO2 SERPL-SCNC: 23 MMOL/L
CREAT SERPL-MCNC: 0.7 MG/DL
DIFFERENTIAL METHOD: ABNORMAL
EOSINOPHIL # BLD AUTO: 0.1 K/UL
EOSINOPHIL NFR BLD: 1.6 %
ERYTHROCYTE [DISTWIDTH] IN BLOOD BY AUTOMATED COUNT: 12.6 %
EST. GFR  (AFRICAN AMERICAN): >60 ML/MIN/1.73 M^2
EST. GFR  (NON AFRICAN AMERICAN): >60 ML/MIN/1.73 M^2
GLUCOSE SERPL-MCNC: 103 MG/DL
HCT VFR BLD AUTO: 41.6 %
HGB BLD-MCNC: 14 G/DL
LYMPHOCYTES # BLD AUTO: 3.9 K/UL
LYMPHOCYTES NFR BLD: 53.6 %
MCH RBC QN AUTO: 28.5 PG
MCHC RBC AUTO-ENTMCNC: 33.7 %
MCV RBC AUTO: 85 FL
MONOCYTES # BLD AUTO: 0.2 K/UL
MONOCYTES NFR BLD: 3 %
NEUTROPHILS # BLD AUTO: 3 K/UL
NEUTROPHILS NFR BLD: 41.6 %
PLATELET # BLD AUTO: 302 K/UL
PMV BLD AUTO: 9.1 FL
POTASSIUM SERPL-SCNC: 4.1 MMOL/L
PROT SERPL-MCNC: 5.9 G/DL
RBC # BLD AUTO: 4.92 M/UL
SODIUM SERPL-SCNC: 136 MMOL/L
WBC # BLD AUTO: 7.28 K/UL

## 2017-02-09 PROCEDURE — 25000003 PHARM REV CODE 250

## 2017-02-09 PROCEDURE — 94640 AIRWAY INHALATION TREATMENT: CPT

## 2017-02-09 PROCEDURE — 25000003 PHARM REV CODE 250: Performed by: ANESTHESIOLOGY

## 2017-02-09 PROCEDURE — 20000000 HC ICU ROOM

## 2017-02-09 PROCEDURE — 99285 EMERGENCY DEPT VISIT HI MDM: CPT | Mod: ,,, | Performed by: OBSTETRICS & GYNECOLOGY

## 2017-02-09 PROCEDURE — 99284 EMERGENCY DEPT VISIT MOD MDM: CPT | Mod: 25

## 2017-02-09 PROCEDURE — 99900035 HC TECH TIME PER 15 MIN (STAT)

## 2017-02-09 PROCEDURE — 25000242 PHARM REV CODE 250 ALT 637 W/ HCPCS: Performed by: OBSTETRICS & GYNECOLOGY

## 2017-02-09 PROCEDURE — 25000003 PHARM REV CODE 250: Performed by: OBSTETRICS & GYNECOLOGY

## 2017-02-09 PROCEDURE — 63600175 PHARM REV CODE 636 W HCPCS: Performed by: OBSTETRICS & GYNECOLOGY

## 2017-02-09 PROCEDURE — 80053 COMPREHEN METABOLIC PANEL: CPT

## 2017-02-09 PROCEDURE — 85025 COMPLETE CBC W/AUTO DIFF WBC: CPT

## 2017-02-09 PROCEDURE — 63600175 PHARM REV CODE 636 W HCPCS

## 2017-02-09 RX ORDER — FAMOTIDINE 20 MG/1
20 TABLET, FILM COATED ORAL 2 TIMES DAILY
Status: DISCONTINUED | OUTPATIENT
Start: 2017-02-09 | End: 2017-02-10 | Stop reason: HOSPADM

## 2017-02-09 RX ORDER — DIPHENHYDRAMINE HYDROCHLORIDE 50 MG/ML
25 INJECTION INTRAMUSCULAR; INTRAVENOUS EVERY 6 HOURS PRN
Status: DISCONTINUED | OUTPATIENT
Start: 2017-02-09 | End: 2017-02-10 | Stop reason: HOSPADM

## 2017-02-09 RX ORDER — DIPHENHYDRAMINE HYDROCHLORIDE 50 MG/ML
INJECTION INTRAMUSCULAR; INTRAVENOUS
Status: COMPLETED
Start: 2017-02-09 | End: 2017-02-09

## 2017-02-09 RX ORDER — SULFAMETHOXAZOLE AND TRIMETHOPRIM 400; 80 MG/1; MG/1
1 TABLET ORAL 2 TIMES DAILY
Qty: 20 TABLET | Refills: 0 | Status: SHIPPED | OUTPATIENT
Start: 2017-02-09 | End: 2017-02-09

## 2017-02-09 RX ORDER — METHYLPREDNISOLONE SOD SUCC 125 MG
125 VIAL (EA) INJECTION
Status: COMPLETED | OUTPATIENT
Start: 2017-02-09 | End: 2017-02-10

## 2017-02-09 RX ORDER — FAMOTIDINE 10 MG/ML
INJECTION INTRAVENOUS
Status: COMPLETED
Start: 2017-02-09 | End: 2017-02-09

## 2017-02-09 RX ORDER — ALBUTEROL SULFATE 2.5 MG/.5ML
2.5 SOLUTION RESPIRATORY (INHALATION)
Status: DISCONTINUED | OUTPATIENT
Start: 2017-02-09 | End: 2017-02-09

## 2017-02-09 RX ORDER — DIPHENHYDRAMINE HYDROCHLORIDE 50 MG/ML
25 INJECTION INTRAMUSCULAR; INTRAVENOUS
Status: COMPLETED | OUTPATIENT
Start: 2017-02-09 | End: 2017-02-09

## 2017-02-09 RX ORDER — EPINEPHRINE 0.3 MG/.3ML
0.3 INJECTION SUBCUTANEOUS ONCE
Status: COMPLETED | OUTPATIENT
Start: 2017-02-09 | End: 2017-02-10

## 2017-02-09 RX ORDER — SODIUM CHLORIDE 9 MG/ML
INJECTION, SOLUTION INTRAVENOUS CONTINUOUS
Status: DISCONTINUED | OUTPATIENT
Start: 2017-02-09 | End: 2017-02-10 | Stop reason: HOSPADM

## 2017-02-09 RX ADMIN — DIPHENHYDRAMINE HYDROCHLORIDE 25 MG: 50 INJECTION, SOLUTION INTRAMUSCULAR; INTRAVENOUS at 10:02

## 2017-02-09 RX ADMIN — DIPHENHYDRAMINE HYDROCHLORIDE 25 MG: 50 INJECTION INTRAMUSCULAR; INTRAVENOUS at 10:02

## 2017-02-09 RX ADMIN — FAMOTIDINE 20 MG: 20 TABLET, FILM COATED ORAL at 11:02

## 2017-02-09 RX ADMIN — ALBUTEROL SULFATE 2.5 MG: 2.5 SOLUTION RESPIRATORY (INHALATION) at 10:02

## 2017-02-09 RX ADMIN — FAMOTIDINE 20 MG: 10 INJECTION INTRAVENOUS at 10:02

## 2017-02-09 RX ADMIN — HYDROCORTISONE SODIUM SUCCINATE 100 MG: 100 INJECTION, POWDER, FOR SOLUTION INTRAMUSCULAR; INTRAVENOUS at 10:02

## 2017-02-09 RX ADMIN — RACEPINEPHRINE HYDROCHLORIDE 0.5 ML: 11.25 SOLUTION RESPIRATORY (INHALATION) at 11:02

## 2017-02-09 NOTE — TELEPHONE ENCOUNTER
Ret call  Pt has been having upper resp symptoms for 3 weeks  Not sure if virus, infection or allergies.  Will go to walk in clinic for possible cultures and treatment

## 2017-02-09 NOTE — TELEPHONE ENCOUNTER
Left message with patient to return call.   ~ jose luis miller cnm    ----- Message from Joesph Thomas sent at 2/9/2017  3:43 PM CST -----  Contact: PT  2ND CALL ## Pt state that she  Is sick and would like to speak to someone about being on  Antibiotics. Please call and advise .

## 2017-02-09 NOTE — PROGRESS NOTES
Patient reports that her sinus infection worsening for the past three weeks.   She is currently 11 days postpartum  She reports a green thick mucous nasal discharge, no cough, no sore throat.  She reports increased sinus pressure and an increasing headache.  She has no fever.    She has been using saline washes, showers and steaming, essential oils and does not want to use a decongestant so her milk does not dry up.  Consult with Dr. Soto, who agrees with antibiotic therapy:  Rx: Bactrim #20 - to take bid x 10 days.   ~ jose luis miller cnm

## 2017-02-09 NOTE — IP AVS SNAPSHOT
[facility] Location ([facility])  [address] [place], [place] [address]  Phone: [phone]           Patient Discharge Instructions     Our goal is to set you up for success. This packet includes information on your condition, medications, and your home care. It will help you to care for yourself so you don't get sicker and need to go back to the hospital.     Please ask your nurse if you have any questions.        There are many details to remember when preparing to leave the hospital. Here is what you will need to do:    1. Take your medicine. If you are prescribed medications, review your Medication List in the following pages. You may have new medications to  at the pharmacy and others that you'll need to stop taking. Review the instructions for how and when to take your medications. Talk with your doctor or nurses if you are unsure of what to do.     2. Go to your follow-up appointments. Specific follow-up information is listed in the following pages. Your may be contacted by a transition nurse or clinical provider about future appointments. Be sure we have all of the phone numbers to reach you, if needed. Please contact your provider's office if you are unable to make an appointment.     3. Watch for warning signs. Your doctor or nurse will give you detailed warning signs to watch for and when to call for assistance. These instructions may also include educational information about your condition. If you experience any of warning signs to your health, call your doctor.               [facility]  Unless otherwise directed by your provider, please contact [facility], our nurse care line that is available for 24/7 assistance.     [phone] (toll-free)    Registered nurses in the [facility] provide clinical advisement, health education, appointment booking, and other advisory services.                    ** Verify the list of medication(s) below is accurate and up to  date. Carry this with you in case of emergency. If your medications have changed, please notify your healthcare provider.             Medication List      STOP taking these medications     sulfamethoxazole-trimethoprim 400-80mg 400-80 mg per tablet   Commonly known as:  BACTRIM                  Please bring to all follow up appointments:    1. A copy of your discharge instructions.  2. All medicines you are currently taking in their original bottles.  3. Identification and insurance card.    Please arrive 15 minutes ahead of scheduled appointment time.    Please call 24 hours in advance if you must reschedule your appointment and/or time.        Follow-up Information     Follow up with Ambika Mehta MD.    Specialty:  Internal Medicine    Why:  As needed    Contact information:    [address]  [address]  [place] [id number] [phone]          Discharge Instructions     Future Orders    Activity as tolerated     Call MD for:  difficulty breathing or increased cough     Call MD for:  increased confusion or weakness     Call MD for:  severe persistent headache     Call MD for:  severe uncontrolled pain     Call MD for:  temperature >100.4     Diet general     Questions:    Total calories:      Fat restriction, if any:      Protein restriction, if any:      Na restriction, if any:      Fluid restriction:      Additional restrictions:      No dressing needed         Discharge Instructions         Drug Reaction: Allergic  You are having an allergic reaction to a drug you have taken. This causes an itchy rash and sometimes swelling of various parts of the body. It may also cause trouble swallowing or breathing. The rash may take a few hours or up to 2 weeks to go away. In the future, remember to tell your doctor about your allergy to this drug so that drugs of this type won't be used again.  Any medicine can cause an allergic reaction. However, penicillin and related drugs, sulfa drugs, aspirin, ibuprofen, and  seizure medicines cause the most allergic reactions. Vaccines may also trigger allergies. People whose parents or siblings have allergies are at a higher risk of developing a drug allergy. Allergy testing may sometimes be required to determine the cause.  Symptoms may occur within minutes, hours, or even weeks after exposure to the drug. It can be a mild or severe reaction, or potentially life threatening. Most of us think of allergic reactions when we have a rash or itchy skin. Symptoms can include:  · Rash, hives, redness, welts, blisters  · Itching, burning, stinging, pain  · Dry, flaky, cracking, scaly skin  · Swelling of the face, lips or other parts of the body  · Fever.  Sometimes fever is the only symptom of a drug reaction.  In elderly people, the risk of fever increases with the number of drugs the older person takes.  More severe symptoms include:  · Trouble swallowing, feeling like your throat is closing  · Trouble breathing, wheezing  · Hoarse voice or trouble speaking  · Nausea, vomiting, diarrhea, stomach cramps  · Feeling faint or lightheaded, rapid heart rate  Home care    The goal of treatment is to help relieve the symptoms, and get you feeling better. Mild to moderate drug reactions usually respond quickly to antihistamines and steroids. The rash will usually fade over several days, but can sometimes last a couple of weeks. Over the next couple of days, there may be times when it is gets a little worse, and then better again. Here are some things to do:  · Throw the drug away and do not take it again. The next reaction may be much worse.  · Add this drug reaction to your electronic medical record.  · When getting a new medicine, always tell the healthcare provider that you are allergic to this drug. Make certain they write it down in your medical record.  · Avoid tight clothing and anything that heats up your skin (hot showers/baths, direct sunlight) since heat will make itching worse.  · An ice  pack (ice cubes in a plastic bag, wrapped in a thin towel, or a frozen bag of peas) will relieve local areas of intense itching and redness.  Don't put ice directly on the skin.  · Avoid scratching which may worsen the reaction, damage your skin and lead to an infection.  · Oral Benadryl (diphenhydramine) is an antihistamine available at drug and grocery stores. Unless a prescription antihistamine was given, Benadryl may be used to reduce itching if large areas of the skin are involved. It may make you sleepy, so be careful using it in the daytime or when going to school, working, or driving. [Note: Do not use Benadryl if you have glaucoma or if you are a man with trouble urinating due to an enlarged prostate.]  There are other antihistamines that cause less drowsiness and are a good alternative for daytime use. Ask your pharmacist for suggestions.  · Do not use Benadryl cream on your skin, because in some people it can cause a further reaction, and make you allergic to Benadryl.  · Calamine lotion or oatmeal baths sometimes help with itching.  Follow-up care  Follow up with your healthcare provider, or as advised if your symptoms do not continue to improve or they get worse.  Call 911  Call 911 if any of these occur:  · Trouble breathing or swallowing, wheezing  · New or worsening swelling in the mouth, throat, or tongue  · Hoarse voice or trouble speaking   · Fainting or loss of consciousness  · Rapid heart rate  · Low blood pressure  · Feeling of doom  · Nausea, vomiting, abdominal pain, diarrhea  When to seek medical advice  Call your healthcare provider right away if any of these occur:  · Shortness of breath  · Increased swelling in the face, eyelids, or lips  · Dizziness, weakness  · Continuing or recurring symptoms  · Spreading areas of itching, redness or swelling  · Signs of infection:  ¨ Spreading redness  ¨ Increased pain or swelling  ¨ Fever (1 degree above your normal temperature) lasting for 24 to 48  "hours Or, whatever your healthcare provider told you to report based on your medical condition  ¨ Colored fluid draining from the inflamed area  Date Last Reviewed: 7/30/2015 © 2000-2016 [employer]. [address] [place] [address], [place] [address]. All rights reserved. This information is not intended as a substitute for professional medical care. Always follow your healthcare professional's instructions.            Primary Diagnosis     Your primary diagnosis was:  Allergic Drug Reaction      Admission Information     Date & Time Provider Department CSN    2/9/2017  9:51 PM Duane Torres MD [facility] [id number]      Care Providers     Provider Role Specialty Primary office phone    Duane Torres MD Attending Provider Hospitalist [phone]      Your Vitals Were     BP Pulse Temp Resp Height Weight    102/66 70 99 °F (37.2 °C) (Oral) 12 5' 8" (1.727 m) 64.7 kg (142 lb 10.2 oz)    SpO2 BMI             97% 21.69 kg/m2         Recent Lab Values     No lab values to display.      Allergies as of 2/10/2017        Reactions    Augmentin [Amoxicillin-pot Clavulanate] Nausea And Vomiting    Bactrim [Sulfamethoxazole-trimethoprim] Anaphylaxis      Advance Directives     An advance directive is a document which, in the event you are no longer able to make decisions for yourself, tells your healthcare team what kind of treatment you do or do not want to receive, or who you would like to make those decisions for you.  If you do not currently have an advance directive, [facility] encourages you to create one.  For more information call:  [phone]), [phone] ([phone]),  or log on to [email or web address].        Language Assistance Services     ATTENTION: Language assistance services are available, free of charge. Please call [phone].      ATENCIÓN: Si habla español, tiene a joy disposición servicios gratuitos de asistencia lingüística. Llame al " [phone].     NAZ Ý: N?u b?n nói Ti?ng Vi?t, có các d?ch v? h? tr? ngôn ng? mi?n phí dành cho b?n. G?i s? [phone].         [facility] complies with applicable Federal civil rights laws and does not discriminate on the basis of race, color, national origin, age, disability, or sex.

## 2017-02-09 NOTE — TELEPHONE ENCOUNTER
----- Message from Joesph Thomas sent at 2/9/2017  3:43 PM CST -----  Contact: PT  2ND CALL ## Pt state that she  Is sick and would like to speak to someone about being on  Antibiotics. Please call and advise .

## 2017-02-10 ENCOUNTER — TELEPHONE (OUTPATIENT)
Dept: LACTATION | Facility: CLINIC | Age: 32
End: 2017-02-10

## 2017-02-10 VITALS
WEIGHT: 142.63 LBS | BODY MASS INDEX: 21.62 KG/M2 | RESPIRATION RATE: 27 BRPM | OXYGEN SATURATION: 98 % | TEMPERATURE: 99 F | HEIGHT: 68 IN | SYSTOLIC BLOOD PRESSURE: 107 MMHG | HEART RATE: 78 BPM | DIASTOLIC BLOOD PRESSURE: 69 MMHG

## 2017-02-10 LAB
ANION GAP SERPL CALC-SCNC: 8 MMOL/L
BASOPHILS # BLD AUTO: 0 K/UL
BASOPHILS NFR BLD: 0 %
BUN SERPL-MCNC: 11 MG/DL
CALCIUM SERPL-MCNC: 8.3 MG/DL
CHLORIDE SERPL-SCNC: 110 MMOL/L
CO2 SERPL-SCNC: 19 MMOL/L
CREAT SERPL-MCNC: 0.7 MG/DL
DIFFERENTIAL METHOD: ABNORMAL
EOSINOPHIL # BLD AUTO: 0 K/UL
EOSINOPHIL NFR BLD: 0.1 %
ERYTHROCYTE [DISTWIDTH] IN BLOOD BY AUTOMATED COUNT: 12.7 %
EST. GFR  (AFRICAN AMERICAN): >60 ML/MIN/1.73 M^2
EST. GFR  (NON AFRICAN AMERICAN): >60 ML/MIN/1.73 M^2
GLUCOSE SERPL-MCNC: 158 MG/DL
HCT VFR BLD AUTO: 39 %
HGB BLD-MCNC: 13 G/DL
LYMPHOCYTES # BLD AUTO: 0.6 K/UL
LYMPHOCYTES NFR BLD: 4.2 %
MCH RBC QN AUTO: 28.1 PG
MCHC RBC AUTO-ENTMCNC: 33.3 %
MCV RBC AUTO: 84 FL
MONOCYTES # BLD AUTO: 0.1 K/UL
MONOCYTES NFR BLD: 0.4 %
NEUTROPHILS # BLD AUTO: 13.4 K/UL
NEUTROPHILS NFR BLD: 95 %
PLATELET # BLD AUTO: 247 K/UL
PMV BLD AUTO: 9.2 FL
POTASSIUM SERPL-SCNC: 4.5 MMOL/L
RBC # BLD AUTO: 4.62 M/UL
SODIUM SERPL-SCNC: 137 MMOL/L
WBC # BLD AUTO: 14.08 K/UL

## 2017-02-10 PROCEDURE — 99222 1ST HOSP IP/OBS MODERATE 55: CPT | Mod: ,,, | Performed by: INTERNAL MEDICINE

## 2017-02-10 PROCEDURE — 80048 BASIC METABOLIC PNL TOTAL CA: CPT

## 2017-02-10 PROCEDURE — 25000003 PHARM REV CODE 250: Performed by: INTERNAL MEDICINE

## 2017-02-10 PROCEDURE — 85025 COMPLETE CBC W/AUTO DIFF WBC: CPT

## 2017-02-10 PROCEDURE — 63600175 PHARM REV CODE 636 W HCPCS: Performed by: OBSTETRICS & GYNECOLOGY

## 2017-02-10 PROCEDURE — 25000003 PHARM REV CODE 250: Performed by: OBSTETRICS & GYNECOLOGY

## 2017-02-10 PROCEDURE — 36415 COLL VENOUS BLD VENIPUNCTURE: CPT

## 2017-02-10 PROCEDURE — 25000003 PHARM REV CODE 250: Performed by: STUDENT IN AN ORGANIZED HEALTH CARE EDUCATION/TRAINING PROGRAM

## 2017-02-10 RX ORDER — CALCIUM CARBONATE 200(500)MG
1000 TABLET,CHEWABLE ORAL ONCE
Status: COMPLETED | OUTPATIENT
Start: 2017-02-10 | End: 2017-02-10

## 2017-02-10 RX ORDER — MAG HYDROX/ALUMINUM HYD/SIMETH 200-200-20
30 SUSPENSION, ORAL (FINAL DOSE FORM) ORAL EVERY 6 HOURS PRN
Status: DISCONTINUED | OUTPATIENT
Start: 2017-02-10 | End: 2017-02-10 | Stop reason: HOSPADM

## 2017-02-10 RX ADMIN — CALCIUM CARBONATE (ANTACID) CHEW TAB 500 MG 1000 MG: 500 CHEW TAB at 03:02

## 2017-02-10 RX ADMIN — SODIUM CHLORIDE: 0.9 INJECTION, SOLUTION INTRAVENOUS at 12:02

## 2017-02-10 RX ADMIN — FAMOTIDINE 20 MG: 20 TABLET, FILM COATED ORAL at 09:02

## 2017-02-10 RX ADMIN — EPINEPHRINE 0.3 MG: 0.3 INJECTION SUBCUTANEOUS at 12:02

## 2017-02-10 RX ADMIN — METHYLPREDNISOLONE SODIUM SUCCINATE 125 MG: 125 INJECTION, POWDER, FOR SOLUTION INTRAMUSCULAR; INTRAVENOUS at 12:02

## 2017-02-10 NOTE — PLAN OF CARE
02/10/17 1049   Final Note   Assessment Type Final Discharge Note   Discharge Disposition Home   Discharge planning education complete? Yes   Hospital Follow Up  Appt(s) scheduled? Yes   Discharge plans and expectations educations in teach back method with documentation complete? Yes   Offered [facility] Bedside Delivery? n/a   Discharge/Hospital Encounter Summary to (non[facility]) PCP Yes   Referral to Outpatient Case Management complete? n/a   Referral to / orders for Home Health Complete? n/a   30 day supply of medicines given at discharge, if documented non-compliance / non-adherence? n/a   Any social issues identified prior to discharge? Yes   Did you assess the readiness or willingness of the family or caregiver to support self management of care? Yes

## 2017-02-10 NOTE — DISCHARGE INSTRUCTIONS
Drug Reaction: Allergic  You are having an allergic reaction to a drug you have taken. This causes an itchy rash and sometimes swelling of various parts of the body. It may also cause trouble swallowing or breathing. The rash may take a few hours or up to 2 weeks to go away. In the future, remember to tell your doctor about your allergy to this drug so that drugs of this type won't be used again.  Any medicine can cause an allergic reaction. However, penicillin and related drugs, sulfa drugs, aspirin, ibuprofen, and seizure medicines cause the most allergic reactions. Vaccines may also trigger allergies. People whose parents or siblings have allergies are at a higher risk of developing a drug allergy. Allergy testing may sometimes be required to determine the cause.  Symptoms may occur within minutes, hours, or even weeks after exposure to the drug. It can be a mild or severe reaction, or potentially life threatening. Most of us think of allergic reactions when we have a rash or itchy skin. Symptoms can include:  · Rash, hives, redness, welts, blisters  · Itching, burning, stinging, pain  · Dry, flaky, cracking, scaly skin  · Swelling of the face, lips or other parts of the body  · Fever.  Sometimes fever is the only symptom of a drug reaction.  In elderly people, the risk of fever increases with the number of drugs the older person takes.  More severe symptoms include:  · Trouble swallowing, feeling like your throat is closing  · Trouble breathing, wheezing  · Hoarse voice or trouble speaking  · Nausea, vomiting, diarrhea, stomach cramps  · Feeling faint or lightheaded, rapid heart rate  Home care    The goal of treatment is to help relieve the symptoms, and get you feeling better. Mild to moderate drug reactions usually respond quickly to antihistamines and steroids. The rash will usually fade over several days, but can sometimes last a couple of weeks. Over the next couple of days, there may be times when it is  gets a little worse, and then better again. Here are some things to do:  · Throw the drug away and do not take it again. The next reaction may be much worse.  · Add this drug reaction to your electronic medical record.  · When getting a new medicine, always tell the healthcare provider that you are allergic to this drug. Make certain they write it down in your medical record.  · Avoid tight clothing and anything that heats up your skin (hot showers/baths, direct sunlight) since heat will make itching worse.  · An ice pack (ice cubes in a plastic bag, wrapped in a thin towel, or a frozen bag of peas) will relieve local areas of intense itching and redness.  Don't put ice directly on the skin.  · Avoid scratching which may worsen the reaction, damage your skin and lead to an infection.  · Oral Benadryl (diphenhydramine) is an antihistamine available at drug and grocery stores. Unless a prescription antihistamine was given, Benadryl may be used to reduce itching if large areas of the skin are involved. It may make you sleepy, so be careful using it in the daytime or when going to school, working, or driving. [Note: Do not use Benadryl if you have glaucoma or if you are a man with trouble urinating due to an enlarged prostate.]  There are other antihistamines that cause less drowsiness and are a good alternative for daytime use. Ask your pharmacist for suggestions.  · Do not use Benadryl cream on your skin, because in some people it can cause a further reaction, and make you allergic to Benadryl.  · Calamine lotion or oatmeal baths sometimes help with itching.  Follow-up care  Follow up with your healthcare provider, or as advised if your symptoms do not continue to improve or they get worse.  Call 911  Call 911 if any of these occur:  · Trouble breathing or swallowing, wheezing  · New or worsening swelling in the mouth, throat, or tongue  · Hoarse voice or trouble speaking   · Fainting or loss of consciousness  · Rapid  heart rate  · Low blood pressure  · Feeling of doom  · Nausea, vomiting, abdominal pain, diarrhea  When to seek medical advice  Call your healthcare provider right away if any of these occur:  · Shortness of breath  · Increased swelling in the face, eyelids, or lips  · Dizziness, weakness  · Continuing or recurring symptoms  · Spreading areas of itching, redness or swelling  · Signs of infection:  ¨ Spreading redness  ¨ Increased pain or swelling  ¨ Fever (1 degree above your normal temperature) lasting for 24 to 48 hours Or, whatever your healthcare provider told you to report based on your medical condition  ¨ Colored fluid draining from the inflamed area  Date Last Reviewed: 7/30/2015 © 2000-2016 [employer]. [address] [place] [address], [place] [address]. All rights reserved. This information is not intended as a substitute for professional medical care. Always follow your healthcare professional's instructions.

## 2017-02-10 NOTE — PLAN OF CARE
Problem: Patient Care Overview  Goal: Plan of Care Review  Outcome: Ongoing (interventions implemented as appropriate)  No changes made at this time. Will continue to monitor.

## 2017-02-10 NOTE — ED PROVIDER NOTES
Encounter Date: 2017       History   No chief complaint on file.    Review of patient's allergies indicates:   Allergen Reactions    Augmentin [amoxicillin-pot clavulanate] Nausea And Vomiting     HPI Comments: 30 yo  s/p  at term 10 days post partum presents c/o itching all over, hives and shortness of breath. Patient states she took prescribed antibiotic Bactrim DS about 1 hour prior to arrival, she was prescribed this for a worsening sinusitis.  . She started having itching and hives and took an oral Benadryl at home but her symptoms got worse. When she arrived at the hospital she c/o feeling dizzing & SOB.    She had taken the same antibiotic, Bactrim, one month earlier    The history is provided by the patient.     Past Medical History   Diagnosis Date    Depression 2015     Lexapro and Wellbrutrin for about 8months.    Infertility     Migraine headache      for 4 months.before pregnancy at beginning of [facility]    Neurological abnormality      States she was diagnosed with Chiari malformation when being evaluated for migraines. Was told it is benign.     Past Medical History Pertinent Negatives   Diagnosis Date Noted    Abnormal Pap smear 2013    Abnormal Pap smear of cervix 2016    Asthma 2016    Blood dyscrasia 2016    Breast cancer 2016    Complication of anesthesia 2016    Coronary artery disease 2016    Deep vein thrombosis 2016    Diabetes mellitus 2016    Disorder of kidney and ureter 2016    Herpes simplex virus (HSV) infection 2016    HIV infection 2016    Hyperlipidemia 2016    Hypertension 2016    Liver disease 2016    Postpartum depression 2016    Rh incompatibility 2016    Seizures 2016    Sickle cell anemia 2016    Stroke 2016    Systemic lupus erythematosus 2016    Thyroid disease 2016    Trauma 2016    Varicosities 2016     Past  Surgical History   Procedure Laterality Date    Breast reduction  2004    Laparscopic surgery       endometriosis    Breast surgery       reduction      Family History   Problem Relation Age of Onset    Prostate cancer Father     Breast cancer Mother 62     negative genetic testing     Colon cancer Maternal Aunt      Social History   Substance Use Topics    Smoking status: Never Smoker    Smokeless tobacco: Never Used    Alcohol use No     Review of Systems   Constitutional: Negative for chills, diaphoresis, fatigue and fever.   HENT: Positive for congestion, rhinorrhea and sinus pressure. Negative for drooling, ear pain, facial swelling, nosebleeds, sneezing, sore throat and trouble swallowing.    Eyes: Negative for discharge and redness.   Respiratory: Positive for chest tightness and shortness of breath. Negative for apnea, cough, choking, wheezing and stridor.    Cardiovascular: Negative for chest pain and palpitations.   Gastrointestinal: Negative for abdominal pain, constipation and diarrhea.   Neurological: Positive for dizziness and light-headedness. Negative for syncope and speech difficulty.       Physical Exam   Initial Vitals   BP Pulse Resp Temp SpO2   -- -- -- -- --            Visit Vitals    /67    Pulse 77    Resp 18    LMP 05/01/2016    SpO2 99%   SpO2: 95-99% on room air    Physical Exam    Nursing note and vitals reviewed.  Constitutional: She appears well-developed and well-nourished. She appears distressed.   HENT:   Head: Normocephalic and atraumatic.   Nose: Nose normal.   Mouth/Throat: Oropharynx is clear and moist. No oropharyngeal exudate.   Eyes: Conjunctivae and EOM are normal.   Neck: Neck supple. No thyromegaly present.   Cardiovascular: Normal rate, regular rhythm, normal heart sounds and intact distal pulses. Exam reveals no gallop and no friction rub.    No murmur heard.  Pulmonary/Chest: No stridor. She has no wheezes. She has no rhonchi. She has no rales. She  exhibits no tenderness.   Abdominal: Soft. Bowel sounds are normal. She exhibits no distension. There is no tenderness. There is no rebound.   Musculoskeletal: She exhibits no edema or tenderness.   Neurological: She is alert and oriented to person, place, and time. She has normal reflexes.   Skin: Skin is warm and dry. Rash (hives across chest/ upper abdomen - improve after Rx) noted. There is erythema (diffuse erythema over chest, neck, abdomen - improved after IV steroids).   Breasts - bilaterally slightly engorged; nontender; no nipple redness   Psychiatric: She has a normal mood and affect. Her behavior is normal. Judgment and thought content normal.         ED Course   Procedures  Labs Reviewed   CBC W/ AUTO DIFFERENTIAL - Abnormal; Notable for the following:        Result Value    MPV 9.1 (*)     Mono # 0.2 (*)     Lymph% 53.6 (*)     Mono% 3.0 (*)     All other components within normal limits   COMPREHENSIVE METABOLIC PANEL             Medical Decision Making:   History:   Old Medical Records: I decided to obtain old medical records.  Old Records Summarized: records from clinic visits.       <> Summary of Records: Prenatal record reviewed. No hx of allergy to Bactrim   at term; hx of migraines; Arnold Chiari malformation Type 1; hx of chronic sinusitis. Given new Rx for Bactrim DS PO BID x 10 days to start today due to worsening sinusitis.  Initial Assessment:   Severe allergic reaction with risk for airway compromise  Lactating mother  Clinical Tests:   Lab Tests: Ordered and Reviewed  ED Management:  Patient seen emergently, brought via Wheelchair from main ED as she is post-partum. Anesthesia called to bedside, IV started, IV benadryl, IV famotidine given. IV hydrocortisone 100 mg IV given, IV fluids. (see Anesthesia Consult)    Patient starting to feel a bit better; vital signs stable; BP improved with fluids. However patient still complaining of feeling of chest tightness. Albuterol neb given x 1.      Medicine/Hospitalist Service consulted for admission to ICU for observation & treatment due to concern for airway. See separate consult  After Nebulizer treatment; patient transferred to ICU with RN and Telemetry monitor & Oxygen for continued close observation.  Epipen sent WITH patient to keep at bedside.                    ED Course     Clinical Impression:   The encounter diagnosis was Allergic reaction caused by a drug, initial encounter.          Claritza Miller MD  02/09/17 2159

## 2017-02-10 NOTE — TELEPHONE ENCOUNTER
"Courtesy call to inquire about breastfeeding. Reports baby is breastfeeding well, with 3-4 or more dirty diapers and "always wet". Reports baby is draining breast well and is satisfied after feedings. Reports was able to move milk efficiently while in hospital with hospital breast pump and denies any breast discomfort or hardened areas. Denies questions or concerns at this time. Encouraged to call lactation warmline as needed, voices understanding.  "

## 2017-02-10 NOTE — PLAN OF CARE
Problem: Patient Care Overview  Goal: Plan of Care Review  Outcome: Ongoing (interventions implemented as appropriate)  VSS, afebrile.  No O2 required.  No c/o of SOB or chest pain.  Only c/o heartburn/indigestion, currently resolved.  Tolerating clear liquids with no difficulty.  Verified with EARLINE Spain (NICU charge nurse) that all medications patient has received are safe for infant and patient does not need to dump breast milk when pumping.  Did pump x 1 at beside with assistance. Father remained @ beside for entirety of shift.  Patient is very eager to be D/C'ed and go home to infant.  All questions and concerns addressed.  Up to date with plan of care.

## 2017-02-10 NOTE — SIGNIFICANT EVENT
Called to ED bedside for patient complaining of shortness of breath and itching following bactrim for a thick mucous nasal discharge.  Recently postpartum.  Patient states she feels her throat is swollen and having difficulty breathing.  Not diaphoretic, no chest pain, not tachycardic.    VS WNL (systolic 90s-100s, pulse rate 70s, sating high 90s)    -placed 18g PIV  -100mg hydrocortisone IV  -25mg benadryl IV  -pepcid IV  -epi and airway cart at bedside and available  -after these interventions patient remains HDS with no change in symptoms.  Hospital medicine consulted and will admit patient for continued observation.      Barrington Rodrigez  Anesthesia, CA-3

## 2017-02-10 NOTE — PHYSICIAN QUERY
PT Name: Zandra Sanchez  MR #: [id number]  Physician Query Form - Cause and Effect Relationship Clarification    Reviewer Mellissa Tracy RN, [email or web address]     This form is a permanent document in the medical record.     Query Date: February 10, 2017  By submitting this query, we are merely seeking further clarification of documentation. Please utilize your independent clinical judgment when addressing the question(s) below.      Supporting Clinical Findings     Location in record   31 y.o. who is 11 days post partum that was prescribed Bactrim for a sinus infection.                                                                                                                                              2/9-H&P   Allergic Reaction to Bactrim                                                                                                                                                                        2/9-H&P     Provider, please clarify if there is any correlation between ___recent labor and delivery____ and ___allergic reaction to bactrim____.     Are the conditions:      [  ] Due to or associated with each other      [ x ] Unrelated to each other      [  ] Other (Please Specify): _________________________      [  ] Clinically Undetermined

## 2017-02-10 NOTE — DISCHARGE SUMMARY
Discharge Summary  Hospital Medicine      Admit Date: 2/9/2017    Discharge Date and Time: 2/10/2017 10:14 AM    Discharge Attending Physician: Duane Torres MD     Diagnoses:  Active Hospital Problems    Diagnosis  POA    *Allergic reaction caused by a drug [T78.40XA]  Yes      Resolved Hospital Problems    Diagnosis Date Resolved POA   No resolved problems to display.       Discharged Condition: stable    Hospital Course: Zandra Sanchez is a 32 yo patient  who is 11 days post partum that was prescribed Bactrim for a sinus infection. She had previously been prescribed Bactrim in the past without issue, however today she started experiencing hives and difficulty breathing. She came into the ER and anesthesia was called. She was given hydrocortisone, famotidine, and benadryl with significant relief of symptoms. Other allergies include Augmentin, which she experiences nausea and vomiting.    She was observed in the ICU over night and her sx. Have resolved this AM. She is breastfeeding and is requesting the discharge.     Her sx have resolved.     Consults: Obstetrics/Gynecology    Significant Diagnostic Studies:   CBC:   Recent Labs  Lab 02/10/17  0431   WBC 14.08*   RBC 4.62   HGB 13.0   HCT 39.0      MCV 84   MCH 28.1   MCHC 33.3       CMP:   Recent Labs  Lab 02/09/17  2250 02/10/17  0431    158*   CALCIUM 8.3* 8.3*   ALBUMIN 2.9*  --    PROT 5.9*  --     137   K 4.1 4.5   CO2 23 19*    110   BUN 13 11   CREATININE 0.7 0.7   ALKPHOS 89  --    ALT 14  --    AST 10  --    BILITOT 0.2  --        Special Treatments/Procedures: none    Disposition: Home or Self Care    Diet: regular    Activity: as tolerated    Patient Instructions:   Reconciled Home Medications:   Current Discharge Medication List      STOP taking these medications       sulfamethoxazole-trimethoprim 400-80mg (BACTRIM) 400-80 mg per tablet Comments:   Reason for Stopping:                 Discharge Procedure Orders  Diet  general     Activity as tolerated     Call MD for:  temperature >100.4     Call MD for:  severe uncontrolled pain     Call MD for:  difficulty breathing or increased cough     Call MD for:  severe persistent headache     Call MD for:  increased confusion or weakness     No dressing needed         Follow-up Information     Follow up with Ambika Mehta MD.    Specialty:  Internal Medicine    Why:  As needed    Contact information:    [address]  [address]  [place] [address]  [phone]

## 2017-02-10 NOTE — ED NOTES
"Patient presents to [facility] waiting room from ED with complaints of weakness, dizziness, SOB, tightness in throat, and stating "I can't breathe, I feel like my throat is closing up and I'm going to black out."     2153 - Hospitalist, anesthesia, and charge RN notified of patient condition.     2154 - Patient in [facility] Room #2. O2 via non-rebreather applied. VS obtained.     2155 - Anesthesia, OB hospitalist, and OB residents at bedside for patient evaluation.     2200 - IV access obtained.     2330 - Patient transferred to ICU for close monitoring.   "

## 2017-02-10 NOTE — H&P
History & Physical  Hospital Medicine      SUBJECTIVE:     Chief Complaint/Reason for Admission: Anaphylaxis     History of Present Illness:  Ms. Zandra Sanchez is a 31 y.o. who is 11 days post partum that was prescribed Bactrim for a sinus infection. She had previously been prescribed Bactrim in the past without issue, however today she started experiencing hives and difficulty breathing. She came into the ER and anesthesia was called. She was given hydrocortisone, famotidine, and benadryl with significant relief of symptoms. Other allergies include Augmentin, which she experiences nausea and vomiting.       (Not in a hospital admission)     Review of patient's allergies indicates:   Allergen Reactions    Augmentin [amoxicillin-pot clavulanate] Nausea And Vomiting       Past Medical History   Diagnosis Date    Depression 06/2015     Lexapro and Wellbrutrin for about 8months.    Infertility     Migraine headache      [facility]    Neurological abnormality      States she was diagnosed with Chiari malformation when being evaluated for migraines. Was told it is benign.       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  Eyes: No visual changes or photophobia  HEENT: No nasal congestion or sore throat  Respiratory: No cough or shorness of breath  Cardiovascular: No chest pain or palpitations  Gastrointestinal: No abdominal pain, no nausea or vomiting, no diarrhea or constipation  Genitourinary: No hematuria or dysuria  Skin: No rash or pruritis  Hematologic/Lymphatic: No easy bruising, bleeding or lymphadenopathy  Musculoskeletal: No arthralgias or myalgias  Neurological: No seizures or tremors  Endocrine: No heat/cold intolerance.  No polyuria/polydipsia  Psychiatric:  No depression or anxiety.      OBJECTIVE:     BP: ()/()   Arterial Line BP: ()/()   There is no height or weight on file to calculate BMI.     Physical Exam:  General appearance: Well developed, well nourished  Head: Normocephalic, atraumatic  Eyes:  Conjunctivae normal. Sclera anicteric. PERR  HEENT: Lips, mucosa, and tongue normal; teeth and gums normal and oropharynx clear.  Neck: Supple, trachea midline, no thyromegaly  Lungs: Clear to auscultation bilaterally with normal respiratory effort  Heart: Regular rate and rhythm, S1, S2 normal, no murmur, click, rub or gallop  Abdomen: Soft, non-tender, non-distended; bowel sounds normal; no masses, no organomegaly  Extremities: No cyanosis or clubbing, No edema  Pulses: 2+ and symmetric  Skin: Skin color, texture, turgor normal. No rashes or lesions  Lymph nodes: Cervical, supraclavicular, and axillary nodes normal.  Neurologic: Normal strength and tone. No focal numbness or weakness  Psychiatric:  Alert and oriented times 3.  Affect  appropriate.      Laboratory: Reviewed and noted -- Please see chart for full lab data.        ASSESSMENT / PLAN:   1. Allergic Reaction to Bactrim   Epi; Nebs (racemic epi); benadryl; and H2RA as needed   Will admit to ICU for airway watch   OB/GYN following -- inpatient consult to lactation placed     Shahram Ayon MD

## 2017-02-10 NOTE — CONSULTS
Upon attempting to see patient, discovered patient had been discharged home. Will give courtesy call from lactation at home.

## 2017-02-10 NOTE — PLAN OF CARE
02/10/17 1046   Discharge Assessment   Assessment Type Discharge Planning Assessment   Confirmed/corrected address and phone number on facesheet? Yes   Assessment information obtained from? Patient;Medical Record   Prior to hospitilization cognitive status: Alert/Oriented   Prior to hospitalization functional status: Independent   Current cognitive status: Alert/Oriented   Current Functional Status: Independent   Lives With spouse   Able to Return to Prior Arrangements yes   Is patient able to care for self after discharge? Yes   How many people do you have in your home that can help with your care after discharge? 1   Patient currently receives home health services? No   Does the patient currently use HME? No   Patient currently receives private duty nursing? N/A   Patient currently receives any other outside agency services? No   Discharge Plan A Home with family   Patient/Family In Agreement With Plan yes

## 2017-03-23 ENCOUNTER — POSTPARTUM VISIT (OUTPATIENT)
Dept: OBSTETRICS AND GYNECOLOGY | Facility: CLINIC | Age: 32
End: 2017-03-23
Payer: COMMERCIAL

## 2017-03-23 VITALS
WEIGHT: 149 LBS | HEIGHT: 68 IN | SYSTOLIC BLOOD PRESSURE: 118 MMHG | DIASTOLIC BLOOD PRESSURE: 82 MMHG | BODY MASS INDEX: 22.58 KG/M2

## 2017-03-23 PROCEDURE — 99999 PR PBB SHADOW E&M-EST. PATIENT-LVL II: CPT | Mod: PBBFAC,,, | Performed by: ADVANCED PRACTICE MIDWIFE

## 2017-03-23 PROCEDURE — 0503F POSTPARTUM CARE VISIT: CPT | Mod: S$GLB,,, | Performed by: ADVANCED PRACTICE MIDWIFE

## 2017-03-23 NOTE — PROGRESS NOTES
"Subjective: Zandra Sanchez is a 31 y.o. female  seven weeks s/p an uncomplicated NSVB  Eating, ambulating, BM and urinating - WNL.   She's walking 5 miles per day.  Reports lochia resolved 4 weeks postpartum  Breast Feeding: yes, exclusively.  Depression: no. Had baby blues, now resolved. Placenta encapsulation "really help."  She reports she doesn't want to use any medications, but she feels anxiety has returned.  She scheduled an appt with her counselor, but had to cancel it.  : "Juana" -  Doing well, per pediatrician, Dr. Guzman.  Contraception: Patient has been infertile in the past. No BCM plans at this time.    Discussed episode where she had a sinus infection and I called in Bactrim for her, which she had previously taken.  She later called with reports of palmar and foot itching and her throat began "closing up."   She called me on my cell and I instructed her to go the ER, which she did. She was admitted for 24 hours for anaphylactic shock.    Objective: /82  Ht 5' 8" (1.727 m)  Wt 67.6 kg (149 lb)  Breastfeeding? Yes  BMI 22.66 kg/m2  GENERAL: No fever or chills.  BREASTS: soft/NTD, nipples atraumatic  ABDOMEN: No abdominal pain. Diastasis = 1fb. Uterus firm, well-involuted.  PERINEUM: no edema, no erythema  LOCHIA: minimal lochia.    Assessment:  1. Stable postpartum woman.  2. Anxiety    Plan:   1. Reviewed PP recommendations and precautions.  2. Anxiety - patient has plans to visit her established counselor already.       Telephone number of psychiatric nurse practitioner, Genet Duque, provided. Will send epic message to Ms. Duque, as well.  3. Contraception: History of infertility. No contraception plans at this time.  4. RTO 1 year - annual exam with CNMs.   ~ jose luis miller cnm      "

## 2017-04-10 PROBLEM — J01.40 ACUTE NON-RECURRENT PANSINUSITIS: Status: RESOLVED | Noted: 2017-01-03 | Resolved: 2017-04-10

## 2017-05-15 PROBLEM — J01.10 SUBACUTE FRONTAL SINUSITIS: Status: RESOLVED | Noted: 2017-02-09 | Resolved: 2017-05-15

## 2018-09-19 ENCOUNTER — TELEPHONE (OUTPATIENT)
Dept: OBSTETRICS AND GYNECOLOGY | Facility: CLINIC | Age: 33
End: 2018-09-19

## 2018-09-19 ENCOUNTER — PATIENT MESSAGE (OUTPATIENT)
Dept: OBSTETRICS AND GYNECOLOGY | Facility: CLINIC | Age: 33
End: 2018-09-19

## 2018-10-02 ENCOUNTER — OFFICE VISIT (OUTPATIENT)
Dept: OBSTETRICS AND GYNECOLOGY | Facility: CLINIC | Age: 33
End: 2018-10-02
Attending: OBSTETRICS & GYNECOLOGY
Payer: COMMERCIAL

## 2018-10-02 VITALS
SYSTOLIC BLOOD PRESSURE: 104 MMHG | BODY MASS INDEX: 17.92 KG/M2 | HEIGHT: 68 IN | DIASTOLIC BLOOD PRESSURE: 63 MMHG | WEIGHT: 118.25 LBS

## 2018-10-02 DIAGNOSIS — Z01.419 WELL WOMAN EXAM WITH ROUTINE GYNECOLOGICAL EXAM: Primary | ICD-10-CM

## 2018-10-02 PROCEDURE — 99395 PREV VISIT EST AGE 18-39: CPT | Mod: S$GLB,,, | Performed by: OBSTETRICS & GYNECOLOGY

## 2018-10-02 PROCEDURE — 88175 CYTOPATH C/V AUTO FLUID REDO: CPT

## 2018-10-02 PROCEDURE — 87624 HPV HI-RISK TYP POOLED RSLT: CPT

## 2018-10-02 RX ORDER — BUPROPION HYDROCHLORIDE 75 MG/1
TABLET ORAL
Refills: 1 | COMMUNITY
Start: 2018-09-17 | End: 2018-10-02

## 2018-10-02 RX ORDER — ESCITALOPRAM OXALATE 10 MG/1
TABLET ORAL
Refills: 0 | COMMUNITY
Start: 2018-09-24 | End: 2019-01-19 | Stop reason: SDUPTHER

## 2018-10-02 NOTE — PROGRESS NOTES
CC: Well woman exam    Zandra Sanchez is a 33 y.o. female  presents for well woman exam.  LMP: Patient's last menstrual period was 2018..  No issues, problems, or complaints.  Cycles were normal until 2 months ago, then she started to have slightly heavier cycles, then last month had a period -9/10, then -, latter was lighter and shorter.  Has been on ketodiet for last 6 months and has lost approximately 5 pounds. Seeing natural fertility specialist, planning to get pregnant again.        Past Medical History:   Diagnosis Date    Depression 2015    Lexapro and Wellbrutrin for about 8months.    Infertility     Migraine headache     for 4 months.before pregnancy at beginning of [facility]    Neurological abnormality     States she was diagnosed with Chiari malformation when being evaluated for migraines. Was told it is benign.     Past Surgical History:   Procedure Laterality Date    breast reduction      BREAST SURGERY      reduction     laparscopic surgery      endometriosis     Social History     Socioeconomic History    Marital status:      Spouse name: Clay    Number of children: Not on file    Years of education: Not on file    Highest education level: Not on file   Social Needs    Financial resource strain: Not on file    Food insecurity - worry: Not on file    Food insecurity - inability: Not on file    Transportation needs - medical: Not on file    Transportation needs - non-medical: Not on file   Occupational History    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 No   BMI 17.98 kg/m²       ROS:  GENERAL: Denies weight gain or weight loss. Feeling well overall.   SKIN: Denies rash or lesions.   HEAD: Denies head injury or headache.   NODES: Denies enlarged lymph nodes.   CHEST: Denies chest pain or shortness of breath.   CARDIOVASCULAR: Denies palpitations or left sided chest pain.   ABDOMEN: No abdominal pain, constipation, diarrhea, nausea, vomiting or rectal bleeding.   URINARY: No frequency, dysuria, hematuria, or burning on urination.  REPRODUCTIVE: See HPI.   BREASTS: The patient performs breast self-examination and denies pain, lumps, or nipple discharge.   HEMATOLOGIC: No easy bruisability or excessive bleeding.   MUSCULOSKELETAL: Denies joint pain or swelling.   NEUROLOGIC: Denies syncope or weakness.   PSYCHIATRIC: Denies depression, anxiety or mood swings.    PHYSICAL EXAM:  APPEARANCE: Well nourished, well developed, in no acute distress.  AFFECT: WNL, alert and oriented x 3  SKIN: No acne or hirsutism  NECK: Neck symmetric without masses or thyromegaly  NODES: No inguinal, cervical, axillary, or femoral lymph node enlargement  CHEST: Good respiratory effect  ABDOMEN: Soft.  No tenderness or masses.  No hepatosplenomegaly.  No hernias.  BREASTS: Symmetrical, no skin changes or visible lesions.  No palpable masses, nipple discharge bilaterally.  PELVIC: Normal " external genitalia without lesions.  Normal hair distribution.  Adequate perineal body, normal urethral meatus.  Vagina moist and well rugated without lesions or discharge.  Cervix pink, without lesions, discharge or tenderness.  No significant cystocele or rectocele.  Bimanual exam shows uterus to be normal size, regular, mobile and nontender.  Adnexa without masses or tenderness.    EXTREMITIES: No edema.    Well woman exam with routine gynecological exam  -     Liquid-based pap smear, screening  -     HPV High Risk Genotypes, PCR    Other orders  -     WELLBUTRIN 75 mg tablet; Take 1 tablet (75 mg total) by mouth 3 (three) times daily. Please dispense brand not generic  Dispense: 90 tablet; Refill: 2        - Referral to psychiatry- Dr. Tse, for management of medications as she goes into pregnancy.          Patient was counseled today on A.C.S. Pap guidelines and recommendations for yearly pelvic exams, mammograms and monthly self breast exams; to see her PCP for other health maintenance.     No Follow-up on file.

## 2018-10-08 ENCOUNTER — LAB VISIT (OUTPATIENT)
Dept: LAB | Facility: OTHER | Age: 33
End: 2018-10-08
Payer: COMMERCIAL

## 2018-10-08 DIAGNOSIS — M54.50 LUMBAGO: Primary | ICD-10-CM

## 2018-10-08 DIAGNOSIS — M54.50 LUMBAGO: ICD-10-CM

## 2018-10-08 LAB — HCG INTACT+B SERPL-ACNC: <1.2 MIU/ML

## 2018-10-08 PROCEDURE — 84702 CHORIONIC GONADOTROPIN TEST: CPT

## 2018-10-08 PROCEDURE — 36415 COLL VENOUS BLD VENIPUNCTURE: CPT

## 2018-10-09 LAB
HPV HR 12 DNA CVX QL NAA+PROBE: NEGATIVE
HPV16 AG SPEC QL: NEGATIVE
HPV18 DNA SPEC QL NAA+PROBE: NEGATIVE

## 2018-10-25 ENCOUNTER — PATIENT MESSAGE (OUTPATIENT)
Dept: OBSTETRICS AND GYNECOLOGY | Facility: CLINIC | Age: 33
End: 2018-10-25

## 2018-12-06 ENCOUNTER — PATIENT MESSAGE (OUTPATIENT)
Dept: OBSTETRICS AND GYNECOLOGY | Facility: CLINIC | Age: 33
End: 2018-12-06

## 2018-12-28 ENCOUNTER — PATIENT MESSAGE (OUTPATIENT)
Dept: OBSTETRICS AND GYNECOLOGY | Facility: CLINIC | Age: 33
End: 2018-12-28

## 2019-01-02 ENCOUNTER — PATIENT MESSAGE (OUTPATIENT)
Dept: OBSTETRICS AND GYNECOLOGY | Facility: CLINIC | Age: 34
End: 2019-01-02

## 2019-01-03 ENCOUNTER — TELEPHONE (OUTPATIENT)
Dept: OBSTETRICS AND GYNECOLOGY | Facility: CLINIC | Age: 34
End: 2019-01-03

## 2019-01-03 DIAGNOSIS — Z34.90 PREGNANCY, UNSPECIFIED GESTATIONAL AGE: Primary | ICD-10-CM

## 2019-01-04 ENCOUNTER — OFFICE VISIT (OUTPATIENT)
Dept: OBSTETRICS AND GYNECOLOGY | Facility: CLINIC | Age: 34
End: 2019-01-04
Attending: OBSTETRICS & GYNECOLOGY
Payer: COMMERCIAL

## 2019-01-04 VITALS
WEIGHT: 121.5 LBS | DIASTOLIC BLOOD PRESSURE: 60 MMHG | HEIGHT: 68 IN | SYSTOLIC BLOOD PRESSURE: 98 MMHG | BODY MASS INDEX: 18.41 KG/M2

## 2019-01-04 DIAGNOSIS — N91.2 ABSENT MENSES: Primary | ICD-10-CM

## 2019-01-04 LAB
B-HCG UR QL: POSITIVE
CTP QC/QA: YES

## 2019-01-04 PROCEDURE — 3008F BODY MASS INDEX DOCD: CPT | Mod: CPTII,S$GLB,, | Performed by: OBSTETRICS & GYNECOLOGY

## 2019-01-04 PROCEDURE — 81025 URINE PREGNANCY TEST: CPT | Mod: S$GLB,,, | Performed by: OBSTETRICS & GYNECOLOGY

## 2019-01-04 PROCEDURE — 99214 OFFICE O/P EST MOD 30 MIN: CPT | Mod: S$GLB,,, | Performed by: OBSTETRICS & GYNECOLOGY

## 2019-01-04 PROCEDURE — 3008F PR BODY MASS INDEX (BMI) DOCUMENTED: ICD-10-PCS | Mod: CPTII,S$GLB,, | Performed by: OBSTETRICS & GYNECOLOGY

## 2019-01-04 PROCEDURE — 99214 PR OFFICE/OUTPT VISIT, EST, LEVL IV, 30-39 MIN: ICD-10-PCS | Mod: S$GLB,,, | Performed by: OBSTETRICS & GYNECOLOGY

## 2019-01-04 PROCEDURE — 87086 URINE CULTURE/COLONY COUNT: CPT

## 2019-01-04 PROCEDURE — 81025 POCT URINE PREGNANCY: ICD-10-PCS | Mod: S$GLB,,, | Performed by: OBSTETRICS & GYNECOLOGY

## 2019-01-04 RX ORDER — ONDANSETRON 4 MG/1
TABLET, ORALLY DISINTEGRATING ORAL
Refills: 0 | COMMUNITY
Start: 2018-09-29 | End: 2019-01-04

## 2019-01-04 RX ORDER — DICLOFENAC SODIUM 10 MG/G
GEL TOPICAL
Refills: 1 | COMMUNITY
Start: 2018-10-03 | End: 2019-06-18

## 2019-01-04 RX ORDER — NALTREXONE HYDROCHLORIDE 50 MG/1
TABLET, FILM COATED ORAL
Refills: 5 | COMMUNITY
Start: 2018-10-23 | End: 2019-05-07

## 2019-01-04 RX ORDER — ONDANSETRON 4 MG/1
TABLET, FILM COATED ORAL
Refills: 2 | COMMUNITY
Start: 2018-12-23 | End: 2019-05-02

## 2019-01-04 RX ORDER — DOXYLAMINE SUCCINATE AND PYRIDOXINE HYDROCHLORIDE 10; 10 MG/1; MG/1
TABLET, DELAYED RELEASE ORAL
Refills: 1 | COMMUNITY
Start: 2018-12-29 | End: 2019-06-18

## 2019-01-04 RX ORDER — METHYLPREDNISOLONE 4 MG/1
TABLET ORAL
Refills: 0 | COMMUNITY
Start: 2018-10-03 | End: 2019-06-18

## 2019-01-04 NOTE — PROGRESS NOTES
"HPI: Pt is a 33 y.o. female who presents complaining of missed menses and (+) home UPT. She denies vaginal bleeding or abdominal pain. She does have nausea and vomiting. Reports that this is improving.  History of  x 2, one for twins, then a guerrero after.  History of entrapped uterus, no symptoms at this time.    ROS:  GENERAL: Feeling well overall.   SKIN: Denies rash or lesions.   HEAD: Denies head injury or headache.   NODES: Denies enlarged lymph nodes.   CHEST: Denies chest pain or shortness of breath.   CARDIOVASCULAR: Denies palpitations or left sided chest pain.   ABDOMEN: No abdominal pain, constipation, diarrhea or rectal bleeding.   URINARY: No dysuria, hematuria, or burning on urination.  REPRODUCTIVE: See HPI.   BREASTS: Denies pain, lumps, or nipple discharge.   HEMATOLOGIC: No easy bruisability or excessive bleeding.   MUSCULOSKELETAL: Denies joint pain or swelling.   NEUROLOGIC: Denies syncope or weakness.   PSYCHIATRIC: Denies depression, anxiety or mood swings.    PE:   BP 98/60   Ht 5' 8" (1.727 m)   Wt 55.1 kg (121 lb 7.6 oz)   LMP 2018   BMI 18.47 kg/m²     APPEARANCE: Well nourished, well developed, in no acute distress.  AFFECT: WNL, alert and oriented x 3.  SKIN: No acne or hirsutism.  NECK: Neck symmetric, without masses or thyromegaly.  NODES: No inguinal, cervical, axillary or femoral lymph node enlargement.  CHEST: Good respiratory effort.   ABDOMEN: Soft. No tenderness or masses. No hepatosplenomegaly. No hernias.  BREASTS: Symmetrical, no skin changes or visible lesions. No palpable masses, adenopathy, or nipple discharge bilaterally.  PELVIC: External female genitalia without lesions. Female hair distribution. Adequate perineal body, Normal urethral meatus. Vagina moist and well rugated without lesions or discharge. There is no significant cystocele or rectocele. Cervix pink without lesions, discharge or tenderness. Uterus is 8 week size, regular, mobile and nontender. " Adnexa without masses.  EXTREMITIES: No edema.    PROCEDURES:  - UPT: positive  - Urine dip: negative  - Dating U/S: to be scheduled with GynUS      Diagnosis:  1. Absent menses        Plan:     Orders Placed This Encounter    Urine culture    HIV 1/2 Ag/Ab (4th Gen)    RPR    Hepatitis B surface antigen    Rubella antibody, IgG    CBC auto differential    Basic metabolic panel    POCT urine pregnancy    Type & Screen       - Rx: Prenatal vitamins  - She does want 1st trimester screening for aneuploidy    Patient was counseled today on routine 1st trimester precautions, including vaginal bleeding and abdominal pain. We also discussed proper weight gain based on the [employer]'s recommendations based on her pre-pregnancy weight, foods to avoid in pregnancy (i.e. sushi, fish that are high in mercury, cold deli meat, and unpasteurized cheeses), environmental precautions such as cat litter, and prenatal vitamin options (i.e. stool softener, DHA).     Total face to face time spent with the patient was 30 minutes and over half spent in counseling on the above related issues.     Follow-up with me in 4 weeks for OB check

## 2019-01-06 LAB — BACTERIA UR CULT: NORMAL

## 2019-01-16 ENCOUNTER — PROCEDURE VISIT (OUTPATIENT)
Dept: OBSTETRICS AND GYNECOLOGY | Facility: CLINIC | Age: 34
End: 2019-01-16
Attending: OBSTETRICS & GYNECOLOGY
Payer: COMMERCIAL

## 2019-01-16 DIAGNOSIS — Z34.90 PREGNANCY, UNSPECIFIED GESTATIONAL AGE: ICD-10-CM

## 2019-01-16 DIAGNOSIS — O36.80X0 ENCOUNTER TO DETERMINE FETAL VIABILITY OF PREGNANCY, SINGLE OR UNSPECIFIED FETUS: ICD-10-CM

## 2019-01-16 DIAGNOSIS — Z36.89 ESTABLISH GESTATIONAL AGE, ULTRASOUND: ICD-10-CM

## 2019-01-16 PROCEDURE — 76801 OB US < 14 WKS SINGLE FETUS: CPT | Mod: S$GLB,,, | Performed by: OBSTETRICS & GYNECOLOGY

## 2019-01-16 PROCEDURE — 76801 PR US, OB <14WKS, TRANSABD, SINGLE GESTATION: ICD-10-PCS | Mod: S$GLB,,, | Performed by: OBSTETRICS & GYNECOLOGY

## 2019-01-17 RX ORDER — BUPROPION HYDROCHLORIDE 75 MG/1
TABLET ORAL
Qty: 90 TABLET | Refills: 1 | Status: SHIPPED | OUTPATIENT
Start: 2019-01-17 | End: 2019-03-07 | Stop reason: SDUPTHER

## 2019-01-21 ENCOUNTER — LAB VISIT (OUTPATIENT)
Dept: LAB | Facility: OTHER | Age: 34
End: 2019-01-21
Payer: COMMERCIAL

## 2019-01-21 DIAGNOSIS — N91.2 ABSENT MENSES: ICD-10-CM

## 2019-01-21 LAB
ABO + RH BLD: NORMAL
ANION GAP SERPL CALC-SCNC: 8 MMOL/L
BASOPHILS # BLD AUTO: 0.01 K/UL
BASOPHILS NFR BLD: 0.2 %
BLD GP AB SCN CELLS X3 SERPL QL: NORMAL
BUN SERPL-MCNC: 9 MG/DL
CALCIUM SERPL-MCNC: 9.3 MG/DL
CHLORIDE SERPL-SCNC: 104 MMOL/L
CO2 SERPL-SCNC: 23 MMOL/L
CREAT SERPL-MCNC: 0.7 MG/DL
DIFFERENTIAL METHOD: ABNORMAL
EOSINOPHIL # BLD AUTO: 0.1 K/UL
EOSINOPHIL NFR BLD: 2.2 %
ERYTHROCYTE [DISTWIDTH] IN BLOOD BY AUTOMATED COUNT: 12.4 %
EST. GFR  (AFRICAN AMERICAN): >60 ML/MIN/1.73 M^2
EST. GFR  (NON AFRICAN AMERICAN): >60 ML/MIN/1.73 M^2
GLUCOSE SERPL-MCNC: 70 MG/DL
HCT VFR BLD AUTO: 34.1 %
HGB BLD-MCNC: 11.7 G/DL
LYMPHOCYTES # BLD AUTO: 1.5 K/UL
LYMPHOCYTES NFR BLD: 31.7 %
MCH RBC QN AUTO: 29.8 PG
MCHC RBC AUTO-ENTMCNC: 34.3 G/DL
MCV RBC AUTO: 87 FL
MONOCYTES # BLD AUTO: 0.2 K/UL
MONOCYTES NFR BLD: 4.8 %
NEUTROPHILS # BLD AUTO: 2.8 K/UL
NEUTROPHILS NFR BLD: 60.9 %
PLATELET # BLD AUTO: 215 K/UL
PMV BLD AUTO: 10.2 FL
POTASSIUM SERPL-SCNC: 4.2 MMOL/L
RBC # BLD AUTO: 3.92 M/UL
SODIUM SERPL-SCNC: 135 MMOL/L
WBC # BLD AUTO: 4.57 K/UL

## 2019-01-21 PROCEDURE — 86703 HIV-1/HIV-2 1 RESULT ANTBDY: CPT

## 2019-01-21 PROCEDURE — 86762 RUBELLA ANTIBODY: CPT

## 2019-01-21 PROCEDURE — 36415 COLL VENOUS BLD VENIPUNCTURE: CPT

## 2019-01-21 PROCEDURE — 85025 COMPLETE CBC W/AUTO DIFF WBC: CPT

## 2019-01-21 PROCEDURE — 80048 BASIC METABOLIC PNL TOTAL CA: CPT

## 2019-01-21 PROCEDURE — 87340 HEPATITIS B SURFACE AG IA: CPT

## 2019-01-21 PROCEDURE — 86592 SYPHILIS TEST NON-TREP QUAL: CPT

## 2019-01-21 PROCEDURE — 86850 RBC ANTIBODY SCREEN: CPT

## 2019-01-22 LAB
HBV SURFACE AG SERPL QL IA: NEGATIVE
HIV 1+2 AB+HIV1 P24 AG SERPL QL IA: NEGATIVE
RPR SER QL: NORMAL
RUBV IGG SER-ACNC: 46.5 IU/ML
RUBV IGG SER-IMP: REACTIVE

## 2019-01-23 ENCOUNTER — PATIENT MESSAGE (OUTPATIENT)
Dept: OBSTETRICS AND GYNECOLOGY | Facility: CLINIC | Age: 34
End: 2019-01-23

## 2019-01-23 RX ORDER — ESCITALOPRAM OXALATE 10 MG/1
TABLET ORAL
Qty: 30 TABLET | Refills: 2 | Status: SHIPPED | OUTPATIENT
Start: 2019-01-23 | End: 2019-03-07 | Stop reason: SDUPTHER

## 2019-01-28 ENCOUNTER — TELEPHONE (OUTPATIENT)
Dept: OBSTETRICS AND GYNECOLOGY | Facility: CLINIC | Age: 34
End: 2019-01-28

## 2019-01-28 NOTE — TELEPHONE ENCOUNTER
----- Message from Ann-Marie Alexandre sent at 1/28/2019  3:06 PM CST -----  Contact: pt   Name of Who is Calling: MUKUND LEÓN [[id number]]       What is the request in detail: patient is returning a call in regards to her testing... Please contact to further discuss and advise.       [facility]       What Number to Call Back if not in [facility]: [phone]          Returned patient call. Patient states she would like to redo the blood work for MT 21 if the lab can't find out what happen. Inform patient I spoke with the supervisor in the lab and she is looking into it now. Inform patient once I hear from the supervisor I will call the patient and let her know. Patient verbalized and understand

## 2019-01-28 NOTE — TELEPHONE ENCOUNTER
Spoke with patient regarding her my chart message. Inform patient I spoke with someone in the lab and I'm waiting on a call back to find out what's going on with patient MT 21. Inform patient I will call her as soon as I know. Patient verbalized and understand

## 2019-01-29 ENCOUNTER — TELEPHONE (OUTPATIENT)
Dept: OBSTETRICS AND GYNECOLOGY | Facility: CLINIC | Age: 34
End: 2019-01-29

## 2019-01-29 NOTE — TELEPHONE ENCOUNTER
Spoke with patient to give patient results of her MT 21 results. Patient verbalized and understand

## 2019-01-29 NOTE — TELEPHONE ENCOUNTER
Spoke with [facility] lab regarding patient results. I was informed they will fax the results to me now at the [facility] location. I will contact patient once I receive the results.

## 2019-02-01 ENCOUNTER — ROUTINE PRENATAL (OUTPATIENT)
Dept: OBSTETRICS AND GYNECOLOGY | Facility: CLINIC | Age: 34
End: 2019-02-01
Attending: OBSTETRICS & GYNECOLOGY
Payer: COMMERCIAL

## 2019-02-01 VITALS
SYSTOLIC BLOOD PRESSURE: 100 MMHG | BODY MASS INDEX: 18.82 KG/M2 | WEIGHT: 123.81 LBS | DIASTOLIC BLOOD PRESSURE: 64 MMHG

## 2019-02-01 DIAGNOSIS — R21 RASH: ICD-10-CM

## 2019-02-01 DIAGNOSIS — G93.5 CHIARI I MALFORMATION: ICD-10-CM

## 2019-02-01 DIAGNOSIS — Z91.89 AT RISK FOR DEPRESSED MOOD DURING POSTPARTUM PERIOD: ICD-10-CM

## 2019-02-01 DIAGNOSIS — Z34.90 PREGNANCY WITH ONE FETUS, ANTEPARTUM: Primary | ICD-10-CM

## 2019-02-01 PROCEDURE — 0502F SUBSEQUENT PRENATAL CARE: CPT | Mod: S$GLB,,, | Performed by: OBSTETRICS & GYNECOLOGY

## 2019-02-01 PROCEDURE — 0502F PR SUBSEQUENT PRENATAL CARE: ICD-10-PCS | Mod: S$GLB,,, | Performed by: OBSTETRICS & GYNECOLOGY

## 2019-02-01 NOTE — PROGRESS NOTES
Reports rash with any exposure to water, hot or cold, reports as bumps with erythema around it, donavan pruritic- derm referral made.   All good in terms of pregnancy.  Connected MOM today.  Currently taking naltrexone from fertility doctor for hormonal side effects, recommend stopping this

## 2019-02-08 ENCOUNTER — TELEPHONE (OUTPATIENT)
Dept: OBSTETRICS AND GYNECOLOGY | Facility: CLINIC | Age: 34
End: 2019-02-08

## 2019-02-08 NOTE — TELEPHONE ENCOUNTER
Spoke with patient she said she has been feeling very ill and will try to go  device today or Monday .

## 2019-02-12 ENCOUNTER — PATIENT MESSAGE (OUTPATIENT)
Dept: ADMINISTRATIVE | Facility: OTHER | Age: 34
End: 2019-02-12

## 2019-02-15 ENCOUNTER — PATIENT OUTREACH (OUTPATIENT)
Dept: OTHER | Facility: OTHER | Age: 34
End: 2019-02-15

## 2019-02-18 NOTE — TELEPHONE ENCOUNTER
Initial introduction with Mrs. Zandra Nicholasana maria completed and the role of the health coaches for Connected MOM was explained.     Reviewed the importance of taking weights and blood pressure readings, using the health  as a resource for physical activity and dietary habits, and that [facility] messages will be sent for weeks 20, 30, and 37 home urine tests.  Reviewed that the patient should contact her OB team with any specific questions regarding her pregnancy, and to contact [facility] or 911 in the case of a medical emergency.

## 2019-02-26 ENCOUNTER — PATIENT MESSAGE (OUTPATIENT)
Dept: OBSTETRICS AND GYNECOLOGY | Facility: CLINIC | Age: 34
End: 2019-02-26

## 2019-02-26 ENCOUNTER — ROUTINE PRENATAL (OUTPATIENT)
Dept: OBSTETRICS AND GYNECOLOGY | Facility: CLINIC | Age: 34
End: 2019-02-26
Attending: OBSTETRICS & GYNECOLOGY
Payer: COMMERCIAL

## 2019-02-26 VITALS — WEIGHT: 124.75 LBS | DIASTOLIC BLOOD PRESSURE: 68 MMHG | SYSTOLIC BLOOD PRESSURE: 96 MMHG | BODY MASS INDEX: 18.97 KG/M2

## 2019-02-26 DIAGNOSIS — R30.9 PAIN EMPTYING BLADDER: Primary | ICD-10-CM

## 2019-02-26 PROCEDURE — 3008F PR BODY MASS INDEX (BMI) DOCUMENTED: ICD-10-PCS | Mod: CPTII,S$GLB,, | Performed by: OBSTETRICS & GYNECOLOGY

## 2019-02-26 PROCEDURE — 99213 PR OFFICE/OUTPT VISIT, EST, LEVL III, 20-29 MIN: ICD-10-PCS | Mod: S$GLB,,, | Performed by: OBSTETRICS & GYNECOLOGY

## 2019-02-26 PROCEDURE — 87086 URINE CULTURE/COLONY COUNT: CPT

## 2019-02-26 PROCEDURE — 3008F BODY MASS INDEX DOCD: CPT | Mod: CPTII,S$GLB,, | Performed by: OBSTETRICS & GYNECOLOGY

## 2019-02-26 PROCEDURE — 99213 OFFICE O/P EST LOW 20 MIN: CPT | Mod: S$GLB,,, | Performed by: OBSTETRICS & GYNECOLOGY

## 2019-02-27 LAB — BACTERIA UR CULT: NO GROWTH

## 2019-03-07 RX ORDER — ESCITALOPRAM OXALATE 10 MG/1
10 TABLET ORAL DAILY
Qty: 30 TABLET | Refills: 6 | Status: SHIPPED | OUTPATIENT
Start: 2019-03-07 | End: 2019-04-15 | Stop reason: SDUPTHER

## 2019-03-07 RX ORDER — BUPROPION HYDROCHLORIDE 75 MG/1
TABLET ORAL
Qty: 90 TABLET | Refills: 1 | Status: SHIPPED | OUTPATIENT
Start: 2019-03-07 | End: 2019-06-09 | Stop reason: SDUPTHER

## 2019-03-07 NOTE — TELEPHONE ENCOUNTER
----- Message from Lanette Iglesias sent at 3/7/2019  9:51 AM CST -----  [facility]   Can the clinic reply in [facility]: No     Please refill the medication(s) listed below. The patient can be reached at this phone number once it is called into the pharmacy [phone]..    Medication #1 escitalopram oxalate (LEXAPRO) 10 MG tablet    Medication #2 buPROPion (WELLBUTRIN) 75 MG tablet    Preferred [facility]acy:[facility]

## 2019-03-18 ENCOUNTER — PATIENT MESSAGE (OUTPATIENT)
Dept: OBSTETRICS AND GYNECOLOGY | Facility: CLINIC | Age: 34
End: 2019-03-18

## 2019-03-22 DIAGNOSIS — Z34.90 PREGNANCY WITH ONE FETUS, ANTEPARTUM: Primary | ICD-10-CM

## 2019-03-22 DIAGNOSIS — O09.529 ANTEPARTUM MULTIGRAVIDA OF ADVANCED MATERNAL AGE: ICD-10-CM

## 2019-03-26 ENCOUNTER — PATIENT MESSAGE (OUTPATIENT)
Dept: ADMINISTRATIVE | Facility: OTHER | Age: 34
End: 2019-03-26

## 2019-03-28 ENCOUNTER — PROCEDURE VISIT (OUTPATIENT)
Dept: MATERNAL FETAL MEDICINE | Facility: CLINIC | Age: 34
End: 2019-03-28
Attending: OBSTETRICS & GYNECOLOGY
Payer: COMMERCIAL

## 2019-03-28 DIAGNOSIS — Z34.90 PREGNANCY WITH ONE FETUS, ANTEPARTUM: ICD-10-CM

## 2019-03-28 DIAGNOSIS — Z36.89 ENCOUNTER FOR ULTRASOUND TO ASSESS FETAL GROWTH: Primary | ICD-10-CM

## 2019-03-28 DIAGNOSIS — O09.529 ANTEPARTUM MULTIGRAVIDA OF ADVANCED MATERNAL AGE: ICD-10-CM

## 2019-03-28 PROCEDURE — 99499 NO LOS: ICD-10-PCS | Mod: S$GLB,,, | Performed by: PEDIATRICS

## 2019-03-28 PROCEDURE — 99499 UNLISTED E&M SERVICE: CPT | Mod: S$GLB,,, | Performed by: PEDIATRICS

## 2019-03-28 PROCEDURE — 76811 PR US, OB FETAL EVAL & EXAM, TRANSABDOM,FIRST GESTATION: ICD-10-PCS | Mod: S$GLB,,, | Performed by: PEDIATRICS

## 2019-03-28 PROCEDURE — 76811 OB US DETAILED SNGL FETUS: CPT | Mod: S$GLB,,, | Performed by: PEDIATRICS

## 2019-03-31 NOTE — PROGRESS NOTES
Indication  ========    Fetal anatomy survey.    History  ======    Risk Factors  Details: AMA    Pregnancy History  ==============    Maternal Lab Tests  Test: mat T 21  Result: negative  Wants to know gender: yes    Method  ======    Transabdominal ultrasound examination, 2D Color Doppler, Voluson E10. View: Good view.    Pregnancy  =========    Abarca pregnancy. Number of fetuses: 1.    Dating  ======    LMP on: 11/4/2018  Cycle: regular cycle  GA by LMP 20 w + 4 d  ALECIA by LMP: 8/11/2019  Ultrasound examination on: 3/28/2019  GA by U/S based upon: AC, BPD, Femur, HC  GA by U/S 20 w + 6 d  ALECIA by U/S: 8/9/2019  Assigned: Dating performed on 01/16/2019, based on the LMP  Assigned GA 20 w + 4 d  Assigned ALECIA: 8/11/2019    General Evaluation  ==============    Cardiac activity: present.  bpm.  Fetal movements: visualized.  Presentation: cephalic.  Placenta: anterior.  Umbilical cord: 3 vessel cord, normal, placental insertion: normal.  Amniotic fluid: normal amount.    Fetal Biometry  ============    Fetal Biometry  BPD 49.4 mm 21w 0d Hadlock  OFD 64.9 mm 22w 0d Pat  .7 mm 20w 5d Hadlock  .0 mm 21w 1d Hadlock  Femur 34.2 mm 20w 5d Hadlock  Cerebellum tr 22.0 mm 21w 3d Mathew  CM 2.9 mm  Nuchal fold 4.76 mm  Humerus 33.3 mm 21w 2d Pat   g 36% Lei  Calculated by: Hadlock (BPD-HC-AC-FL)  EFW (lb) 0 lb  EFW (oz) 14 oz  Cephalic index 0.76  HC / AC 1.14  FL / BPD 0.69  FL / AC 0.21   bpm  Head / Face / Neck   7.6 mm    Fetal Anatomy  ============    Cranium: normal  Lateral ventricles: normal  Choroid plexus: normal  Midline falx: normal  Cavum septi pellucidi: normal  Cerebellum: normal  Cisterna magna: normal  Head shape: normal  Rt lateral ventricle: normal  Lt lateral ventricle: normal  Rt choroid plexus: normal  Lt choroid plexus: normal  Parenchyma: normal  Third ventricle: normal  Posterior fossa: normal  Cerebellar lobes: normal  Vermis: normal  Neck: appears  normal  Nuchal fold: normal  Lips: normal  Profile: normal  Nose: normal  Maxilla: normal  Mandible: normal  4-chamber view: normal  RVOT: normal  LVOT: normal  Heart / Thorax: Septal views: normal  Situs: normal  Aortic arch: normal  Ductal arch: normal  SVC: normal  IVC: normal  3-vessel view: normal  3-vessel-trachea view: normal  Cardiac position: normal  Cardiac axis: normal  Cardiac size: normal  Cardiac rhythm: normal  Rt lung: normal  Lt lung: normal  Diaphragm: normal  Cord insertion: normal  Stomach: normal  Kidneys: normal  Bladder: normal  Genitals: normal  Abdom. wall: appears normal  Abdom. cavity: normal  Rt kidney: normal  Lt kidney: normal  Liver: normal  Bowel: normal  Cervical spine: normal  Thoracic spine: normal  Lumbar spine: normal  Sacral spine: normal  Arms: normal  Legs: normal  Rt arm: normal  Lt arm: normal  Rt hand: present  Lt hand: present  Rt leg: normal  Lt leg: normal  Rt foot: normal  Lt foot: normal  Position of hands: normal  Position of feet: normal  Gender: female  Wants to know gender: yes    Maternal Structures  ===============    Uterus / Cervix  Uterus: Normal  Cervical length 34.3 mm  Ovaries / Tubes / Adnexa  Rt ovary: Not visualized  Lt ovary: Not visualized  Other: Uterus and adnexa normal          Impression  =========    A detailed fetal anatomic ultrasound examination was performed today due to the following high risk indication: AMA.    No fetal structural abnormalities are identified today, and fetal size is appropriate for EGA.    Cervical length is normal.  Placental location is normal without evidence of previa.            Recommendation  ==============    return in 12 weeks for fetal growth and evaluation secondary to AMA        Thank you again for allowing us to participate in the care of your patients. If you have any questions concerning today's consultation, feel free  to contact me or one of my partners. We can be reached at [phone] during normal  business hours. If you have a question after normal  business hours, please contact Labor and Delivery at [phone].

## 2019-04-15 ENCOUNTER — PATIENT MESSAGE (OUTPATIENT)
Dept: OBSTETRICS AND GYNECOLOGY | Facility: CLINIC | Age: 34
End: 2019-04-15

## 2019-04-15 DIAGNOSIS — F41.9 ANXIETY DURING PREGNANCY: Primary | ICD-10-CM

## 2019-04-15 DIAGNOSIS — O99.340 ANXIETY DURING PREGNANCY: Primary | ICD-10-CM

## 2019-04-15 RX ORDER — ESCITALOPRAM OXALATE 10 MG/1
15 TABLET ORAL DAILY
Qty: 45 TABLET | Refills: 11 | Status: SHIPPED | OUTPATIENT
Start: 2019-04-15 | End: 2020-03-09

## 2019-04-23 ENCOUNTER — TELEPHONE (OUTPATIENT)
Dept: OBSTETRICS AND GYNECOLOGY | Facility: CLINIC | Age: 34
End: 2019-04-23

## 2019-05-01 NOTE — PROGRESS NOTES
Patient presents with concerns over incarcerated uterus, she has had this in the past and would like to be evaluated as she has found it difficult to urinate and completely empty bladder, she is also feeling a good deal of pelvic pain and pressure.  On exam, FHT verified then vaginal exam performed, cervix anterior and high, findus palpated at posterior vagina.  Fundus gently lifted out of dul-de-sac.  FHT reverified, cervix noted to be closed/ thick  Patient instructed to do rocking on hands and knees at home and closely monitor for recurrence.  If recurrence, will place pessary.  F/U as scheduled.

## 2019-05-02 ENCOUNTER — HOSPITAL ENCOUNTER (EMERGENCY)
Facility: OTHER | Age: 34
Discharge: HOME | End: 2019-05-02
Attending: OBSTETRICS & GYNECOLOGY
Payer: COMMERCIAL

## 2019-05-02 VITALS
DIASTOLIC BLOOD PRESSURE: 59 MMHG | RESPIRATION RATE: 18 BRPM | SYSTOLIC BLOOD PRESSURE: 96 MMHG | OXYGEN SATURATION: 99 % | HEART RATE: 87 BPM | TEMPERATURE: 99 F

## 2019-05-02 DIAGNOSIS — K52.9 GASTROENTERITIS: Primary | ICD-10-CM

## 2019-05-02 DIAGNOSIS — Z3A.25 25 WEEKS GESTATION OF PREGNANCY: ICD-10-CM

## 2019-05-02 LAB
ALBUMIN SERPL BCP-MCNC: 3.3 G/DL (ref 3.5–5.2)
ALP SERPL-CCNC: 77 U/L (ref 55–135)
ALT SERPL W/O P-5'-P-CCNC: 11 U/L (ref 10–44)
ANION GAP SERPL CALC-SCNC: 6 MMOL/L (ref 8–16)
AST SERPL-CCNC: 11 U/L (ref 10–40)
BASOPHILS # BLD AUTO: 0.02 K/UL (ref 0–0.2)
BASOPHILS NFR BLD: 0.1 % (ref 0–1.9)
BILIRUB SERPL-MCNC: 0.2 MG/DL (ref 0.1–1)
BUN SERPL-MCNC: 11 MG/DL (ref 6–20)
CALCIUM SERPL-MCNC: 8.9 MG/DL (ref 8.7–10.5)
CHLORIDE SERPL-SCNC: 105 MMOL/L (ref 95–110)
CO2 SERPL-SCNC: 25 MMOL/L (ref 23–29)
CREAT SERPL-MCNC: 0.7 MG/DL (ref 0.5–1.4)
DIFFERENTIAL METHOD: ABNORMAL
EOSINOPHIL # BLD AUTO: 0.1 K/UL (ref 0–0.5)
EOSINOPHIL NFR BLD: 0.6 % (ref 0–8)
ERYTHROCYTE [DISTWIDTH] IN BLOOD BY AUTOMATED COUNT: 13.2 % (ref 11.5–14.5)
EST. GFR  (AFRICAN AMERICAN): >60 ML/MIN/1.73 M^2
EST. GFR  (NON AFRICAN AMERICAN): >60 ML/MIN/1.73 M^2
GLUCOSE SERPL-MCNC: 90 MG/DL (ref 70–110)
HCT VFR BLD AUTO: 36.5 % (ref 37–48.5)
HGB BLD-MCNC: 12.8 G/DL (ref 12–16)
LYMPHOCYTES # BLD AUTO: 1.3 K/UL (ref 1–4.8)
LYMPHOCYTES NFR BLD: 9 % (ref 18–48)
MCH RBC QN AUTO: 30.3 PG (ref 27–31)
MCHC RBC AUTO-ENTMCNC: 35.1 G/DL (ref 32–36)
MCV RBC AUTO: 86 FL (ref 82–98)
MONOCYTES # BLD AUTO: 0.6 K/UL (ref 0.3–1)
MONOCYTES NFR BLD: 4 % (ref 4–15)
NEUTROPHILS # BLD AUTO: 12 K/UL (ref 1.8–7.7)
NEUTROPHILS NFR BLD: 86 % (ref 38–73)
PLATELET # BLD AUTO: 179 K/UL (ref 150–350)
PMV BLD AUTO: 9.4 FL (ref 9.2–12.9)
POTASSIUM SERPL-SCNC: 4 MMOL/L (ref 3.5–5.1)
PROT SERPL-MCNC: 6.6 G/DL (ref 6–8.4)
RBC # BLD AUTO: 4.23 M/UL (ref 4–5.4)
SODIUM SERPL-SCNC: 136 MMOL/L (ref 136–145)
WBC # BLD AUTO: 13.94 K/UL (ref 3.9–12.7)

## 2019-05-02 PROCEDURE — 63600175 PHARM REV CODE 636 W HCPCS: Performed by: OBSTETRICS & GYNECOLOGY

## 2019-05-02 PROCEDURE — 59025 PR FETAL 2N-STRESS TEST: ICD-10-PCS | Mod: 26,,, | Performed by: OBSTETRICS & GYNECOLOGY

## 2019-05-02 PROCEDURE — 96374 THER/PROPH/DIAG INJ IV PUSH: CPT

## 2019-05-02 PROCEDURE — 99284 EMERGENCY DEPT VISIT MOD MDM: CPT | Mod: 25,,, | Performed by: OBSTETRICS & GYNECOLOGY

## 2019-05-02 PROCEDURE — 59025 FETAL NON-STRESS TEST: CPT

## 2019-05-02 PROCEDURE — 99284 EMERGENCY DEPT VISIT MOD MDM: CPT | Mod: 25

## 2019-05-02 PROCEDURE — 80053 COMPREHEN METABOLIC PANEL: CPT

## 2019-05-02 PROCEDURE — 59025 FETAL NON-STRESS TEST: CPT | Mod: 26,,, | Performed by: OBSTETRICS & GYNECOLOGY

## 2019-05-02 PROCEDURE — 25000003 PHARM REV CODE 250: Performed by: STUDENT IN AN ORGANIZED HEALTH CARE EDUCATION/TRAINING PROGRAM

## 2019-05-02 PROCEDURE — 25000003 PHARM REV CODE 250: Performed by: OBSTETRICS & GYNECOLOGY

## 2019-05-02 PROCEDURE — 96361 HYDRATE IV INFUSION ADD-ON: CPT

## 2019-05-02 PROCEDURE — 99284 PR EMERGENCY DEPT VISIT,LEVEL IV: ICD-10-PCS | Mod: 25,,, | Performed by: OBSTETRICS & GYNECOLOGY

## 2019-05-02 PROCEDURE — 85025 COMPLETE CBC W/AUTO DIFF WBC: CPT

## 2019-05-02 RX ORDER — DICYCLOMINE HYDROCHLORIDE 20 MG/1
20 TABLET ORAL 2 TIMES DAILY
Qty: 6 TABLET | Refills: 0 | Status: SHIPPED | OUTPATIENT
Start: 2019-05-02 | End: 2019-05-05

## 2019-05-02 RX ORDER — ONDANSETRON 4 MG/1
4 TABLET, FILM COATED ORAL EVERY 6 HOURS PRN
Qty: 20 TABLET | Refills: 0 | Status: SHIPPED | OUTPATIENT
Start: 2019-05-02 | End: 2021-07-30

## 2019-05-02 RX ORDER — BUTORPHANOL TARTRATE 2 MG/ML
1 INJECTION INTRAMUSCULAR; INTRAVENOUS ONCE
Status: COMPLETED | OUTPATIENT
Start: 2019-05-02 | End: 2019-05-02

## 2019-05-02 RX ORDER — ONDANSETRON 4 MG/1
4 TABLET, ORALLY DISINTEGRATING ORAL ONCE
Status: COMPLETED | OUTPATIENT
Start: 2019-05-02 | End: 2019-05-02

## 2019-05-02 RX ORDER — ACETAMINOPHEN 325 MG/1
650 TABLET ORAL ONCE
Status: DISCONTINUED | OUTPATIENT
Start: 2019-05-02 | End: 2019-05-02

## 2019-05-02 RX ORDER — DICYCLOMINE HYDROCHLORIDE 10 MG/1
20 CAPSULE ORAL ONCE
Status: COMPLETED | OUTPATIENT
Start: 2019-05-02 | End: 2019-05-02

## 2019-05-02 RX ADMIN — ONDANSETRON 4 MG: 4 TABLET, ORALLY DISINTEGRATING ORAL at 08:05

## 2019-05-02 RX ADMIN — BUTORPHANOL TARTRATE 1 MG: 2 INJECTION, SOLUTION INTRAMUSCULAR; INTRAVENOUS at 08:05

## 2019-05-02 RX ADMIN — DICYCLOMINE HYDROCHLORIDE 20 MG: 10 CAPSULE ORAL at 10:05

## 2019-05-02 RX ADMIN — SODIUM CHLORIDE, SODIUM LACTATE, POTASSIUM CHLORIDE, AND CALCIUM CHLORIDE 1000 ML: .6; .31; .03; .02 INJECTION, SOLUTION INTRAVENOUS at 08:05

## 2019-05-02 NOTE — ED PROVIDER NOTES
Encounter Date: 2019       History     Chief Complaint   Patient presents with    Abdominal Pain     Zandra Sanchez is a 33 y.o. [id number] at 25w4d presents complaining of n/v since early the morning. Denies fever,chills, diarrhea. Last tolerated food last night at 7pm at [employer] if anybody else who ate with her is ill. Complains of crampy upper abdominal pain but denies contraction like pain. Denies h/o of  birth. Denies sick contacts.   This IUP is complicated by AMA.  Patient denies contractions, denies vaginal bleeding, denies LOF.   Fetal Movement: normal.          Review of patient's allergies indicates:   Allergen Reactions    Augmentin [amoxicillin-pot clavulanate] Nausea And Vomiting     Other reaction(s): GI Intolerance    Bactrim [sulfamethoxazole-trimethoprim] Anaphylaxis    Sulfa (sulfonamide antibiotics) Anaphylaxis     Past Medical History:   Diagnosis Date    Depression 2015    Lexapro and Wellbrutrin for about 8months.    Infertility     Migraine headache     for 4 months.before pregnancy at beginning of [facility]    Neurological abnormality     States she was diagnosed with Chiari malformation when being evaluated for migraines. Was told it is benign.     Past Surgical History:   Procedure Laterality Date    breast reduction  2004    BREAST SURGERY      reduction     laparscopic surgery      endometriosis     Family History   Problem Relation Age of Onset    Prostate cancer Father     Breast cancer Mother 62        negative genetic testing     Colon cancer Maternal Aunt      Social History     Tobacco Use    Smoking status: Never Smoker    Smokeless tobacco: Never Used   Substance Use Topics    Alcohol use: No    Drug use: No     Review of Systems   Constitutional: Positive for appetite change. Negative for chills, fatigue and fever.   HENT: Negative for congestion.    Eyes: Negative for visual disturbance.   Respiratory: Negative for cough and shortness of  breath.    Cardiovascular: Negative for chest pain and palpitations.   Gastrointestinal: Positive for abdominal pain and diarrhea. Negative for abdominal distention, constipation and vomiting.   Genitourinary: Negative for difficulty urinating, dysuria, hematuria, vaginal bleeding and vaginal discharge.   Skin: Negative for rash.   Neurological: Negative for dizziness, seizures, light-headedness and headaches.   Hematological: Does not bruise/bleed easily.   Psychiatric/Behavioral: Negative for dysphoric mood. The patient is not nervous/anxious.        Physical Exam     Initial Vitals [05/02/19 0815]   BP Pulse Resp Temp SpO2   (!) 96/59 87 18 98.5 °F (36.9 °C) 99 %      MAP       --         Physical Exam    Vitals reviewed.  Constitutional: She appears well-developed and well-nourished. She is not diaphoretic. No distress.   Emesis bucket at bedside     HENT:   Head: Normocephalic and atraumatic.   Eyes: Conjunctivae are normal. Right eye exhibits no discharge. Left eye exhibits no discharge.   Neck: Neck supple.   Cardiovascular: Normal rate and regular rhythm.   Pulmonary/Chest: Breath sounds normal. No respiratory distress.   Abdominal: Soft. She exhibits no distension. There is tenderness. There is no rebound and no guarding.   Musculoskeletal: She exhibits no edema.   Neurological: She is alert and oriented to person, place, and time.   Skin: Skin is warm and dry. No rash noted. No erythema.   Psychiatric: She has a normal mood and affect. Her behavior is normal. Judgment and thought content normal.         ED Course   Obtain Fetal nonstress test (NST)  Date/Time: 5/2/2019 9:06 AM  Performed by: Jo Hobbs MD  Authorized by: Alejandrina Morris MD     Nonstress Test:     Variability:  6-25 BPM    Decelerations:  None    Baseline:  140    Contractions:  Not present  Biophysical Profile:     Overall Impression:  Reassuring  Post-procedure:      Reassuring for GA       Labs Reviewed   CBC W/ AUTO DIFFERENTIAL -  Abnormal; Notable for the following components:       Result Value    WBC 13.94 (*)     Hematocrit 36.5 (*)     Gran # (ANC) 12.0 (*)     Gran% 86.0 (*)     Lymph% 9.0 (*)     All other components within normal limits   COMPREHENSIVE METABOLIC PANEL - Abnormal; Notable for the following components:    Albumin 3.3 (*)     Anion Gap 6 (*)     All other components within normal limits          Imaging Results    None          Medical Decision Making:   ED Management:  VSS, abdomen non acute  CBC WNL  CMP WNL  Believe likely gastroenteritis. Will give IV fluids, zofran, and stadol for pain. Much improved after bentyl     Tolerating po now, dc home with zofran and bentyl               Attending Attestation:   Physician Attestation Statement for Resident:  As the supervising MD   Physician Attestation Statement: I have personally seen and examined this patient.   I agree with the above history. -:   As the supervising MD I agree with the above treatment, course, plan, and disposition.  I was personally present during the critical portions of the procedure(s) performed by the resident and was immediately available in the ED to provide services and assistance as needed during the entire procedure.  I have reviewed and agree with the residents interpretation of the following: rhythm strips.  I have reviewed the following: old records at this facility.                    ED Course as of May 08 0244   Thu May 02, 2019   0860 I evalauted Ms. Sanchez and discussed plan with Dr. Hobbs.  Pt presents at 25w4d with n/v starting at 7am after eating at [employer] last night.  She has had no other sick contacts.  She has not had fevers or diarrhea.  She also has sharp abdominal pains that are colicky in nature.   VSS, abdominal exam benign.  CTAB/RRR  Pending cbc and cmp  Getting IV fluids, will give stadol and zofran    [HU]   0923 Pt states that nausea is better, but pain is still there.  Reviewed labs,basically normal.  Small elevation  in WBC.  Likely gastritis.  Fetal status remains reassuring.  Will see if she can tolerate PO    [HU]   1040 Cramps much improved with bentyl, she has been able to tolerate crackers and water.  Will d/c home with zofran and bentyl    [HU]      ED Course User Index  [HU] Dilia Toure DO     Clinical Impression:       ICD-10-CM ICD-9-CM   1. Gastroenteritis K52.9 558.9   2. 25 weeks gestation of pregnancy Z3A.25 V22.2         Disposition:   Disposition: Discharged  Condition: Stable                        Dilia Toure DO  05/08/19 0244

## 2019-05-02 NOTE — PROGRESS NOTES
Received to [facility] with c/o abdominal pain that started this AM, states feeling baby move well. Dr. Hobbs notified

## 2019-05-03 ENCOUNTER — PATIENT MESSAGE (OUTPATIENT)
Dept: OBSTETRICS AND GYNECOLOGY | Facility: CLINIC | Age: 34
End: 2019-05-03

## 2019-05-07 ENCOUNTER — ROUTINE PRENATAL (OUTPATIENT)
Dept: OBSTETRICS AND GYNECOLOGY | Facility: CLINIC | Age: 34
End: 2019-05-07
Attending: OBSTETRICS & GYNECOLOGY
Payer: COMMERCIAL

## 2019-05-07 VITALS — BODY MASS INDEX: 20.68 KG/M2 | DIASTOLIC BLOOD PRESSURE: 64 MMHG | SYSTOLIC BLOOD PRESSURE: 102 MMHG | WEIGHT: 136 LBS

## 2019-05-07 DIAGNOSIS — Z71.89 ENCOUNTER FOR MEDICATION REVIEW AND COUNSELING: ICD-10-CM

## 2019-05-07 DIAGNOSIS — Z34.90 PREGNANCY WITH ONE FETUS, ANTEPARTUM: Primary | ICD-10-CM

## 2019-05-07 PROCEDURE — 0502F SUBSEQUENT PRENATAL CARE: CPT | Mod: CPTII,S$GLB,, | Performed by: OBSTETRICS & GYNECOLOGY

## 2019-05-07 PROCEDURE — 0502F PR SUBSEQUENT PRENATAL CARE: ICD-10-PCS | Mod: CPTII,S$GLB,, | Performed by: OBSTETRICS & GYNECOLOGY

## 2019-05-07 RX ORDER — ESCITALOPRAM OXALATE 10 MG/1
10 TABLET ORAL
COMMUNITY
End: 2019-06-18 | Stop reason: SDUPTHER

## 2019-05-07 RX ORDER — BUPROPION HYDROCHLORIDE 75 MG/1
75 TABLET ORAL
COMMUNITY
End: 2019-06-18 | Stop reason: SDUPTHER

## 2019-05-07 NOTE — PROGRESS NOTES
Discussed weight gain, doing well, will continue on trajectory.    Recent stomach bug, lytes were ok.   Reports that her back pain has improved, but has continued to take very low dose naltrexone which is prescribed by her outside fertility doctor, reports that stopped for one week, and had a lot more back pain, has started again, she is concerned stopping this, but also concerned that this may interfere with epidural, will see ob anesthesia to discuss this.  Ob glucose ordered and discussed.

## 2019-05-13 ENCOUNTER — PATIENT MESSAGE (OUTPATIENT)
Dept: OBSTETRICS AND GYNECOLOGY | Facility: CLINIC | Age: 34
End: 2019-05-13

## 2019-05-13 ENCOUNTER — LAB VISIT (OUTPATIENT)
Dept: LAB | Facility: OTHER | Age: 34
End: 2019-05-13
Attending: OBSTETRICS & GYNECOLOGY
Payer: COMMERCIAL

## 2019-05-13 DIAGNOSIS — Z34.90 PREGNANCY WITH ONE FETUS, ANTEPARTUM: ICD-10-CM

## 2019-05-13 LAB
BASOPHILS # BLD AUTO: 0.01 K/UL (ref 0–0.2)
BASOPHILS NFR BLD: 0.1 % (ref 0–1.9)
DIFFERENTIAL METHOD: ABNORMAL
EOSINOPHIL # BLD AUTO: 0.1 K/UL (ref 0–0.5)
EOSINOPHIL NFR BLD: 1.1 % (ref 0–8)
ERYTHROCYTE [DISTWIDTH] IN BLOOD BY AUTOMATED COUNT: 13 % (ref 11.5–14.5)
GLUCOSE SERPL-MCNC: 102 MG/DL (ref 70–140)
HCT VFR BLD AUTO: 32.2 % (ref 37–48.5)
HGB BLD-MCNC: 10.8 G/DL (ref 12–16)
LYMPHOCYTES # BLD AUTO: 1.5 K/UL (ref 1–4.8)
LYMPHOCYTES NFR BLD: 17.8 % (ref 18–48)
MCH RBC QN AUTO: 29 PG (ref 27–31)
MCHC RBC AUTO-ENTMCNC: 33.5 G/DL (ref 32–36)
MCV RBC AUTO: 87 FL (ref 82–98)
MONOCYTES # BLD AUTO: 0.4 K/UL (ref 0.3–1)
MONOCYTES NFR BLD: 4.8 % (ref 4–15)
NEUTROPHILS # BLD AUTO: 6.2 K/UL (ref 1.8–7.7)
NEUTROPHILS NFR BLD: 75.8 % (ref 38–73)
PLATELET # BLD AUTO: 199 K/UL (ref 150–350)
PMV BLD AUTO: 9.1 FL (ref 9.2–12.9)
RBC # BLD AUTO: 3.72 M/UL (ref 4–5.4)
WBC # BLD AUTO: 8.13 K/UL (ref 3.9–12.7)

## 2019-05-13 PROCEDURE — 36415 COLL VENOUS BLD VENIPUNCTURE: CPT

## 2019-05-13 PROCEDURE — 82950 GLUCOSE TEST: CPT

## 2019-05-13 PROCEDURE — 85025 COMPLETE CBC W/AUTO DIFF WBC: CPT

## 2019-05-27 ENCOUNTER — PATIENT MESSAGE (OUTPATIENT)
Dept: OBSTETRICS AND GYNECOLOGY | Facility: CLINIC | Age: 34
End: 2019-05-27

## 2019-05-31 ENCOUNTER — ROUTINE PRENATAL (OUTPATIENT)
Dept: OBSTETRICS AND GYNECOLOGY | Facility: CLINIC | Age: 34
End: 2019-05-31
Attending: OBSTETRICS & GYNECOLOGY
Payer: COMMERCIAL

## 2019-05-31 VITALS — SYSTOLIC BLOOD PRESSURE: 88 MMHG | WEIGHT: 138.31 LBS | DIASTOLIC BLOOD PRESSURE: 54 MMHG | BODY MASS INDEX: 21.03 KG/M2

## 2019-05-31 DIAGNOSIS — Z34.90 PREGNANCY WITH ONE FETUS, ANTEPARTUM: ICD-10-CM

## 2019-05-31 DIAGNOSIS — R42 POSTURAL DIZZINESS WITH NEAR SYNCOPE: Primary | ICD-10-CM

## 2019-05-31 DIAGNOSIS — R55 POSTURAL DIZZINESS WITH NEAR SYNCOPE: Primary | ICD-10-CM

## 2019-05-31 PROCEDURE — 0502F SUBSEQUENT PRENATAL CARE: CPT | Mod: CPTII,S$GLB,, | Performed by: OBSTETRICS & GYNECOLOGY

## 2019-05-31 PROCEDURE — 0502F PR SUBSEQUENT PRENATAL CARE: ICD-10-PCS | Mod: CPTII,S$GLB,, | Performed by: OBSTETRICS & GYNECOLOGY

## 2019-05-31 RX ORDER — NALTREXONE HYDROCHLORIDE 50 MG/1
10 TABLET, FILM COATED ORAL DAILY
COMMUNITY
Start: 2019-05-08 | End: 2021-02-12

## 2019-05-31 NOTE — PROGRESS NOTES
Repots episodes of near syncope with standing for long periods and postural changes.  Some increased pulse rate with experiences though denies irregular heart beat.  Improved with leaning forward.  Discussed compression stockings, will check EKG and lytes, moving legs with sitting and wearing regular support belt.  She is drinking gatorade and pedialyte, talked about increasing dietary sodium.  FHT good.  Starting Iron.  Agrees to TDAP  F/U in 2 weeks.

## 2019-06-03 ENCOUNTER — TELEPHONE (OUTPATIENT)
Dept: OBSTETRICS AND GYNECOLOGY | Facility: CLINIC | Age: 34
End: 2019-06-03

## 2019-06-03 ENCOUNTER — PATIENT MESSAGE (OUTPATIENT)
Dept: OBSTETRICS AND GYNECOLOGY | Facility: CLINIC | Age: 34
End: 2019-06-03

## 2019-06-03 NOTE — TELEPHONE ENCOUNTER
Spoke with patient to get patient schedule for EKG and lab work. Patient verbalized and understand

## 2019-06-04 ENCOUNTER — HOSPITAL ENCOUNTER (OUTPATIENT)
Dept: CARDIOLOGY | Facility: OTHER | Age: 34
Discharge: HOME | End: 2019-06-04
Attending: OBSTETRICS & GYNECOLOGY
Payer: COMMERCIAL

## 2019-06-04 DIAGNOSIS — R42 POSTURAL DIZZINESS WITH NEAR SYNCOPE: ICD-10-CM

## 2019-06-04 DIAGNOSIS — R55 POSTURAL DIZZINESS WITH NEAR SYNCOPE: ICD-10-CM

## 2019-06-11 RX ORDER — BUPROPION HYDROCHLORIDE 75 MG/1
TABLET ORAL
Qty: 90 TABLET | Refills: 1 | Status: SHIPPED | OUTPATIENT
Start: 2019-06-11 | End: 2019-08-15 | Stop reason: SDUPTHER

## 2019-06-17 ENCOUNTER — PROCEDURE VISIT (OUTPATIENT)
Dept: MATERNAL FETAL MEDICINE | Facility: CLINIC | Age: 34
End: 2019-06-17
Attending: OBSTETRICS & GYNECOLOGY
Payer: COMMERCIAL

## 2019-06-17 DIAGNOSIS — Z36.89 ENCOUNTER FOR ULTRASOUND TO ASSESS FETAL GROWTH: ICD-10-CM

## 2019-06-17 PROCEDURE — 76816 OB US FOLLOW-UP PER FETUS: CPT | Mod: S$GLB,,, | Performed by: PEDIATRICS

## 2019-06-17 PROCEDURE — 99499 NO LOS: ICD-10-PCS | Mod: S$GLB,,, | Performed by: PEDIATRICS

## 2019-06-17 PROCEDURE — 99499 UNLISTED E&M SERVICE: CPT | Mod: S$GLB,,, | Performed by: PEDIATRICS

## 2019-06-17 PROCEDURE — 76816 PR  US,PREGNANT UTERUS,F/U,TRANSABD APP: ICD-10-PCS | Mod: S$GLB,,, | Performed by: PEDIATRICS

## 2019-06-17 NOTE — PROGRESS NOTES
Indication  ========    Follow-up evaluation for fetal growth    History  ======    Risk Factors  Details: AMA    Pregnancy History  ==============    Maternal Lab Tests  Test: mat T 21  Result of other maternal screening test: negative    Fetal Growth Overview  =================    Exam date        GA              BPD (mm)         HC (mm)        AC (mm)        FL (mm)         HL (mm)        EFW (g)  03/28/2019        20w 4d        49.4                  183.7             161.0             34.2              33.3               390    36%  06/17/2019        32w 1d        83.3                  301.5             283.3             61.5                                   1,974    32%      Method  ======    Transabdominal ultrasound examination, Voluson E10. View: Good view    Pregnancy  =========    Abarca pregnancy. Number of fetuses: 1    Dating  ======    LMP on: 11/4/2018  Cycle: regular cycle  GA by LMP 32 w + 1 d  ALECIA by LMP: 8/11/2019  Ultrasound examination on: 6/17/2019  GA by U/S based upon: AC, BPD, Femur, HC  GA by U/S 32 w + 6 d  ALECIA by U/S: 8/6/2019  Assigned: Dating performed on 01/16/2019, based on the LMP  Assigned GA 32 w + 1 d  Assigned ALECIA: 8/11/2019  Pregnancy length 280 d    General Evaluation  ==============    Cardiac activity present.  bpm.  Fetal movements visualized.  Presentation cephalic.  Placenta Placental site: anterior.  Amniotic fluid Amount of AF: normal amount. MVP 4.5 cm.    Fetal Biometry  ============    Standard  BPD 83.3 mm  33w 4d                Hadlock    .0 mm  34w 4d                Pat    .5 mm  33w 3d                Hadlock    .3 mm  32w 3d                Hadlock    Femur 61.5 mm  31w 6d                Hadlock    HC / AC 1.06    EFW 1,974 g          32%        Lei    EFW (lb) 4 lb  EFW (oz) 6 oz  EFW by: Hadlock (BPD-HC-AC-FL)  Head / Face / Neck  Cephalic index 0.79    Extremities / Bony Struc  FL / BPD 0.74    FL / AC 0.22    Other  Structures   bpm    Fetal Anatomy  ============    Cranium: normal  4-chamber view: normal  Stomach: normal  Kidneys: normal  Bladder: normal  Wants to know gender: yes        Impression  =========    Fetal size is AGA with the EFW at the 32nd percentile.  Normal repeat limited fetal anatomic survey.    AFV is normal.          Recommendation  ==============    Repeat ultrasound study as clinically indicated.      Thank you again for allowing us to participate in the care of your patients. If you have any questions concerning today's consultation, feel free  to contact me or one of my partners. We can be reached at [phone] during normal business hours. If you have a question after normal  business hours, please contact Labor and Delivery at [phone].

## 2019-06-18 ENCOUNTER — ROUTINE PRENATAL (OUTPATIENT)
Dept: OBSTETRICS AND GYNECOLOGY | Facility: CLINIC | Age: 34
End: 2019-06-18
Attending: OBSTETRICS & GYNECOLOGY
Payer: COMMERCIAL

## 2019-06-18 ENCOUNTER — LAB VISIT (OUTPATIENT)
Dept: LAB | Facility: HOSPITAL | Age: 34
End: 2019-06-18
Attending: OBSTETRICS & GYNECOLOGY
Payer: COMMERCIAL

## 2019-06-18 VITALS — WEIGHT: 142 LBS | BODY MASS INDEX: 21.59 KG/M2 | SYSTOLIC BLOOD PRESSURE: 96 MMHG | DIASTOLIC BLOOD PRESSURE: 60 MMHG

## 2019-06-18 DIAGNOSIS — F41.9 ANXIETY: ICD-10-CM

## 2019-06-18 DIAGNOSIS — Z34.90 PREGNANCY WITH ONE FETUS, ANTEPARTUM: Primary | ICD-10-CM

## 2019-06-18 DIAGNOSIS — R55 NEAR SYNCOPE: ICD-10-CM

## 2019-06-18 DIAGNOSIS — L29.9 ITCHING: ICD-10-CM

## 2019-06-18 LAB
ALT SERPL W/O P-5'-P-CCNC: 10 U/L (ref 10–44)
AST SERPL-CCNC: 10 U/L (ref 10–40)

## 2019-06-18 PROCEDURE — 0502F SUBSEQUENT PRENATAL CARE: CPT | Mod: CPTII,S$GLB,, | Performed by: OBSTETRICS & GYNECOLOGY

## 2019-06-18 PROCEDURE — 0502F PR SUBSEQUENT PRENATAL CARE: ICD-10-PCS | Mod: CPTII,S$GLB,, | Performed by: OBSTETRICS & GYNECOLOGY

## 2019-06-18 PROCEDURE — 82239 BILE ACIDS TOTAL: CPT

## 2019-06-18 PROCEDURE — 84460 ALANINE AMINO (ALT) (SGPT): CPT

## 2019-06-18 PROCEDURE — 84450 TRANSFERASE (AST) (SGOT): CPT

## 2019-06-19 NOTE — PROGRESS NOTES
Doing well, increased protein and calories in diet, some improvement with wearing support belt, discussed increased sodium intake.  Reports that she is having itching over arms and legs at night, CMP and bile acids today.    Labs reviewed, no alterations in prior liver enzymes.  Denies ctxns, VB or LOF.  F/U in 2 weeks.  Will follow labs.

## 2019-06-20 LAB — BILE AC SERPL-SCNC: 4 MCMOL/L

## 2019-07-05 ENCOUNTER — PATIENT MESSAGE (OUTPATIENT)
Dept: OBSTETRICS AND GYNECOLOGY | Facility: CLINIC | Age: 34
End: 2019-07-05

## 2019-07-08 ENCOUNTER — PATIENT MESSAGE (OUTPATIENT)
Dept: OBSTETRICS AND GYNECOLOGY | Facility: CLINIC | Age: 34
End: 2019-07-08

## 2019-07-09 ENCOUNTER — HOSPITAL ENCOUNTER (EMERGENCY)
Facility: OTHER | Age: 34
Discharge: HOME | End: 2019-07-09
Attending: OBSTETRICS & GYNECOLOGY
Payer: COMMERCIAL

## 2019-07-09 ENCOUNTER — PATIENT MESSAGE (OUTPATIENT)
Dept: OBSTETRICS AND GYNECOLOGY | Facility: CLINIC | Age: 34
End: 2019-07-09

## 2019-07-09 VITALS
RESPIRATION RATE: 18 BRPM | DIASTOLIC BLOOD PRESSURE: 71 MMHG | OXYGEN SATURATION: 100 % | TEMPERATURE: 98 F | SYSTOLIC BLOOD PRESSURE: 103 MMHG | HEART RATE: 68 BPM

## 2019-07-09 DIAGNOSIS — Z36.89: ICD-10-CM

## 2019-07-09 DIAGNOSIS — O36.8130 DECREASED FETAL MOVEMENTS IN THIRD TRIMESTER, SINGLE OR UNSPECIFIED FETUS: Primary | ICD-10-CM

## 2019-07-09 DIAGNOSIS — Z3A.35 35 WEEKS GESTATION OF PREGNANCY: ICD-10-CM

## 2019-07-09 PROCEDURE — 76818 FETAL BIOPHYS PROFILE W/NST: CPT | Mod: 26,,, | Performed by: OBSTETRICS & GYNECOLOGY

## 2019-07-09 PROCEDURE — 99283 EMERGENCY DEPT VISIT LOW MDM: CPT | Mod: 25,,, | Performed by: OBSTETRICS & GYNECOLOGY

## 2019-07-09 PROCEDURE — 59025 FETAL NON-STRESS TEST: CPT

## 2019-07-09 PROCEDURE — 99284 EMERGENCY DEPT VISIT MOD MDM: CPT | Mod: 25

## 2019-07-09 PROCEDURE — 76818 FETAL BIOPHYS PROFILE W/NST: CPT

## 2019-07-09 PROCEDURE — 76818 PR US, OB, FETAL BIOPHYSICAL, W/NST: ICD-10-PCS | Mod: 26,,, | Performed by: OBSTETRICS & GYNECOLOGY

## 2019-07-09 PROCEDURE — 99283 PR EMERGENCY DEPT VISIT,LEVEL III: ICD-10-PCS | Mod: 25,,, | Performed by: OBSTETRICS & GYNECOLOGY

## 2019-07-09 NOTE — ED PROVIDER NOTES
"Encounter Date: 2019       History     Chief Complaint   Patient presents with    Decreased Fetal Movement     Per pt baby last moved last night. Pt attempted to get baby to move and no movement back. Pt states she felt baby drop.     Zandra is a 34yo  at 35w2d who presents after the baby "dropped" yesterday into the pelvis.  Then this morning she has not felt baby move at all.  She has had nothing to eat or drink this morning.  No contractions, gush of fluid or vaginal bleeding  Her PNC has been with Dr. Rodriguez and has been uneventful.         Review of patient's allergies indicates:   Allergen Reactions    Augmentin [amoxicillin-pot clavulanate] Nausea And Vomiting     Other reaction(s): GI Intolerance    Bactrim [sulfamethoxazole-trimethoprim] Anaphylaxis    Sulfa (sulfonamide antibiotics) Anaphylaxis     Past Medical History:   Diagnosis Date    Depression 2015    Lexapro and Wellbrutrin for about 8months.    Infertility     Migraine headache     for [facility] months.before pregnancy at [facility] of [facility]    Neurological abnormality     States she was diagnosed with Chiari malformation when being evaluated for migraines. Was told it is benign.     Past Surgical History:   Procedure Laterality Date    breast reduction      BREAST SURGERY      reduction     laparscopic surgery      endometriosis     Family History   Problem Relation Age of Onset    Prostate cancer Father     Breast cancer Mother 62        negative genetic testing     Colon cancer Maternal Aunt      Social History     Tobacco Use    Smoking status: Never Smoker    Smokeless tobacco: Never Used   Substance Use Topics    Alcohol use: No    Drug use: No     Review of Systems   Constitutional: Negative for activity change, appetite change, chills and fever.   Eyes: Negative for visual disturbance.   Respiratory: Negative for cough and shortness of breath.    Cardiovascular: Negative for leg swelling.   Gastrointestinal: " Negative for abdominal pain, constipation, diarrhea, nausea and vomiting.   Genitourinary: Negative for vaginal bleeding, vaginal discharge and vaginal pain.   Neurological: Negative for seizures and speech difficulty.       Physical Exam     Initial Vitals   BP Pulse Resp Temp SpO2   07/09/19 0713 07/09/19 0713 07/09/19 0715 07/09/19 0741 07/09/19 0757   110/75 72 18 97.9 °F (36.6 °C) 100 %      MAP       --                Physical Exam    Vitals reviewed.  Constitutional: She appears well-developed and well-nourished. No distress.   HENT:   Head: Normocephalic and atraumatic.   Eyes: Conjunctivae are normal. Right eye exhibits no discharge. Left eye exhibits no discharge.   Neck: Neck supple.   Cardiovascular: Normal rate and regular rhythm.   Pulmonary/Chest: No respiratory distress.   Abdominal: Soft. There is no tenderness. There is no rebound and no guarding.   Gravid, fundus NT   Neurological: She is alert and oriented to person, place, and time.   Skin: Skin is warm and dry. No rash noted. No erythema.   Psychiatric: She has a normal mood and affect. Thought content normal.         ED Course   Obtain Fetal nonstress test (NST)  Date/Time: 7/9/2019 7:47 AM  Performed by: Dilia Toure DO  Authorized by: Dilia Toure DO     Nonstress Test:     Variability:  6-25 BPM    Decelerations:  None    Accelerations:  15 bpm    Baseline:  115  Biophysical Profile:     Nonstress Test Interpretation: reactive      Overall Impression:  Reassuring  Post-procedure:      BPP:   Fluid: 3.5x2cm pocket  + gross movement  + fine movement  + practice breathing   BPP: 10/10      Labs Reviewed - No data to display       Imaging Results    None          Medical Decision Making:   ED Management:  Will finish up 20 minute NST then check cervix prior to d/c home  Baby moving well now that she has had apple juice                   ED Course as of Jul 09 0815 Tue Jul 09, 2019   0814 Pt now feeling baby move  Ready for d/c  home  FCC reinforced, try something sweet, if still not moving after an hour of these things, come in to [facility]    [HU]      ED Course User Index  [HU] Dilia Toure DO     Clinical Impression:       ICD-10-CM ICD-9-CM   1. Decreased fetal movements in third trimester, single or unspecified fetus O36.8130 655.73   2. 35 weeks gestation of pregnancy Z3A.35 V22.2   3. Fetal heart rate reactive Z36.89 V28.89         Disposition:   Disposition: Discharged  Condition: Stable         DO Dilia Mirza DO  07/09/19 0816

## 2019-07-09 NOTE — ED NOTES
Patient denies pain and is resting comfortably. Pt denies vaginal bleeding or cramping and leaking of fluid.

## 2019-07-09 NOTE — DISCHARGE INSTRUCTIONS
Labor and Childbirth: Active Labor  During active labor, your contractions will be stronger and more rhythmic than with early labor. They peak and subside like waves. They may happen 3 to 5 minutes apart and last about 45 to 60 seconds. This part of labor can be hard work. But it is often shorter than early labor. When you reach active labor, exams and tests will be done to see how you and your baby are doing.     Your cervix may dilate 4 to 8 centimeters during the first part of active labor.   Evaluating you and your baby  An exam tells how you and your baby are responding to contractions. Your blood pressure, temperature, and pulse will be checked. A blood or urine sample may also be taken. A fetal monitor will be used to check your babys heart rate. Sometimes an IV (intravenous) line is started to give you medicine and fluids.  Moving ahead with labor  You may now feel contractions in your whole stomach instead of just the lower part (like during early labor). If your amniotic sac has not broken already, it may break now. Or, it may be broken for you. To help your baby descend, change position often. Walking or sitting in a rocking chair or recliner may help. You may find it hard to relax even though you are tired. You may also be less interested in talking than you were earlier. If youre having anesthesia, you will get it now.   Special issues during labor  If labor doesnt progress well or a problem arises, you may need a . But your healthcare providers may take certain steps to help you avoid a :  · If your cervix isnt dilating, a medicine (oxytocin) may be used to augment labor.  · If fetal monitoring shows your baby isnt getting enough oxygen, shifting your body position may help. You may also be given oxygen through a mask.  · If you have preeclampsia (a condition that results in high blood pressure, swelling, and other symptoms), you may be given medicines by IV (intravenous). Your  healthcare provider may also tell you to lie on your left side.  Responding to contractions  During contractions, try to stay relaxed. Tense muscles use more oxygen, eat up your bodys energy, and increase pain. Use the breathing and relaxation techniques you may have learned. And let your support person know how he or she can help. If youve had problems during a previous birth, focus on the present. Keep in mind that no 2 births are the same.     Support persons note  Here's how you can help:  · Have the mother walk or change positions at least once an hour. This improves circulation and helps the baby descend.  · Keep reminding the mother to breathe and relax through each contraction.  · Reassure her. Try to keep her from getting anxious or overstressed.  · Take care of yourself. Take a short break to eat or go to the bathroom when you need to.  · Rest when the mother does. Youll both benefit.   Date Last Reviewed: 8/16/2015  © 9496-1721 [employer] [address] [place] [address], [place] [address]. All rights reserved. This information is not intended as a substitute for professional medical care. Always follow your healthcare professional's instructions.

## 2019-07-15 ENCOUNTER — LAB VISIT (OUTPATIENT)
Dept: LAB | Facility: OTHER | Age: 34
End: 2019-07-15
Attending: OBSTETRICS & GYNECOLOGY
Payer: COMMERCIAL

## 2019-07-15 ENCOUNTER — ROUTINE PRENATAL (OUTPATIENT)
Dept: OBSTETRICS AND GYNECOLOGY | Facility: CLINIC | Age: 34
End: 2019-07-15
Attending: OBSTETRICS & GYNECOLOGY
Payer: COMMERCIAL

## 2019-07-15 VITALS
DIASTOLIC BLOOD PRESSURE: 70 MMHG | WEIGHT: 146.38 LBS | SYSTOLIC BLOOD PRESSURE: 100 MMHG | BODY MASS INDEX: 22.26 KG/M2

## 2019-07-15 DIAGNOSIS — Z34.93 PREGNANCY WITH ONE FETUS, THIRD TRIMESTER: ICD-10-CM

## 2019-07-15 DIAGNOSIS — Z34.93 PREGNANCY WITH ONE FETUS, THIRD TRIMESTER: Primary | ICD-10-CM

## 2019-07-15 LAB
BASOPHILS # BLD AUTO: 0.02 K/UL (ref 0–0.2)
BASOPHILS NFR BLD: 0.2 % (ref 0–1.9)
DIFFERENTIAL METHOD: ABNORMAL
EOSINOPHIL # BLD AUTO: 0.1 K/UL (ref 0–0.5)
EOSINOPHIL NFR BLD: 1.3 % (ref 0–8)
ERYTHROCYTE [DISTWIDTH] IN BLOOD BY AUTOMATED COUNT: 12.9 % (ref 11.5–14.5)
HCT VFR BLD AUTO: 32.7 % (ref 37–48.5)
HGB BLD-MCNC: 11 G/DL (ref 12–16)
IMM GRANULOCYTES # BLD AUTO: 0.05 K/UL (ref 0–0.04)
IMM GRANULOCYTES NFR BLD AUTO: 0.6 % (ref 0–0.5)
LYMPHOCYTES # BLD AUTO: 1.7 K/UL (ref 1–4.8)
LYMPHOCYTES NFR BLD: 20.5 % (ref 18–48)
MCH RBC QN AUTO: 27.9 PG (ref 27–31)
MCHC RBC AUTO-ENTMCNC: 33.6 G/DL (ref 32–36)
MCV RBC AUTO: 83 FL (ref 82–98)
MONOCYTES # BLD AUTO: 0.5 K/UL (ref 0.3–1)
MONOCYTES NFR BLD: 6.4 % (ref 4–15)
NEUTROPHILS # BLD AUTO: 5.9 K/UL (ref 1.8–7.7)
NEUTROPHILS NFR BLD: 71 % (ref 38–73)
NRBC BLD-RTO: 0 /100 WBC
PLATELET # BLD AUTO: 222 K/UL (ref 150–350)
PMV BLD AUTO: 9.5 FL (ref 9.2–12.9)
RBC # BLD AUTO: 3.94 M/UL (ref 4–5.4)
WBC # BLD AUTO: 8.33 K/UL (ref 3.9–12.7)

## 2019-07-15 PROCEDURE — 36415 COLL VENOUS BLD VENIPUNCTURE: CPT

## 2019-07-15 PROCEDURE — 85025 COMPLETE CBC W/AUTO DIFF WBC: CPT

## 2019-07-15 PROCEDURE — 87081 CULTURE SCREEN ONLY: CPT

## 2019-07-15 PROCEDURE — 99999 PR PBB SHADOW E&M-EST. PATIENT-LVL III: CPT | Mod: PBBFAC,,, | Performed by: OBSTETRICS & GYNECOLOGY

## 2019-07-15 PROCEDURE — 99999 PR PBB SHADOW E&M-EST. PATIENT-LVL III: ICD-10-PCS | Mod: PBBFAC,,, | Performed by: OBSTETRICS & GYNECOLOGY

## 2019-07-15 PROCEDURE — 0502F PR SUBSEQUENT PRENATAL CARE: ICD-10-PCS | Mod: CPTII,S$GLB,, | Performed by: OBSTETRICS & GYNECOLOGY

## 2019-07-15 PROCEDURE — 0502F SUBSEQUENT PRENATAL CARE: CPT | Mod: CPTII,S$GLB,, | Performed by: OBSTETRICS & GYNECOLOGY

## 2019-07-15 NOTE — PROGRESS NOTES
Patient reports some intense epigastric pain after eating certain foods, can lead to vomiting.  Reports that if she takes zantac prior to eating she does better, discussed taking zantac daily to see if she does better.  If no improvement will get gallbladder ultrasound.    Otherwise doing well, weight gain has been appropriate and improved, feeling less weakness.  Some contractions here and there, change in cervix noted.   Precautions reinforced.  F/U in 1 week

## 2019-07-17 ENCOUNTER — HOSPITAL ENCOUNTER (EMERGENCY)
Facility: OTHER | Age: 34
Discharge: HOME | End: 2019-07-17
Attending: OBSTETRICS & GYNECOLOGY
Payer: COMMERCIAL

## 2019-07-17 ENCOUNTER — PATIENT MESSAGE (OUTPATIENT)
Dept: OBSTETRICS AND GYNECOLOGY | Facility: CLINIC | Age: 34
End: 2019-07-17

## 2019-07-17 VITALS — HEIGHT: 68 IN | WEIGHT: 146.38 LBS | BODY MASS INDEX: 22.19 KG/M2

## 2019-07-17 DIAGNOSIS — M51.36 DEGENERATIVE DISC DISEASE, LUMBAR: ICD-10-CM

## 2019-07-17 DIAGNOSIS — Z3A.36 36 WEEKS GESTATION OF PREGNANCY: ICD-10-CM

## 2019-07-17 DIAGNOSIS — O47.9 UTERINE CONTRACTIONS DURING PREGNANCY: Primary | ICD-10-CM

## 2019-07-17 LAB — BACTERIA SPEC AEROBE CULT: NORMAL

## 2019-07-17 PROCEDURE — 25000003 PHARM REV CODE 250: Performed by: STUDENT IN AN ORGANIZED HEALTH CARE EDUCATION/TRAINING PROGRAM

## 2019-07-17 PROCEDURE — 99284 EMERGENCY DEPT VISIT MOD MDM: CPT | Mod: 25

## 2019-07-17 PROCEDURE — 59025 FETAL NON-STRESS TEST: CPT

## 2019-07-17 PROCEDURE — 96360 HYDRATION IV INFUSION INIT: CPT | Mod: 59

## 2019-07-17 PROCEDURE — 99284 EMERGENCY DEPT VISIT MOD MDM: CPT | Mod: 25,,, | Performed by: OBSTETRICS & GYNECOLOGY

## 2019-07-17 PROCEDURE — 99284 PR EMERGENCY DEPT VISIT,LEVEL IV: ICD-10-PCS | Mod: 25,,, | Performed by: OBSTETRICS & GYNECOLOGY

## 2019-07-17 PROCEDURE — 25000003 PHARM REV CODE 250: Performed by: OBSTETRICS & GYNECOLOGY

## 2019-07-17 PROCEDURE — 59025 PR FETAL 2N-STRESS TEST: ICD-10-PCS | Mod: 26,,, | Performed by: OBSTETRICS & GYNECOLOGY

## 2019-07-17 PROCEDURE — 59025 FETAL NON-STRESS TEST: CPT | Mod: 26,,, | Performed by: OBSTETRICS & GYNECOLOGY

## 2019-07-17 RX ORDER — ACETAMINOPHEN 500 MG
1000 TABLET ORAL ONCE
Status: COMPLETED | OUTPATIENT
Start: 2019-07-17 | End: 2019-07-17

## 2019-07-17 RX ADMIN — SODIUM CHLORIDE, SODIUM LACTATE, POTASSIUM CHLORIDE, AND CALCIUM CHLORIDE 1000 ML: .6; .31; .03; .02 INJECTION, SOLUTION INTRAVENOUS at 06:07

## 2019-07-17 RX ADMIN — ACETAMINOPHEN 1000 MG: 500 TABLET ORAL at 08:07

## 2019-07-17 NOTE — ED PROVIDER NOTES
Encounter Date: 2019       History     Chief Complaint   Patient presents with    Contractions     33 y.o.  @ 36w3d here for rule out labor. She has been joshua since 1pm yesterday and they are getting more painful. Denies LOF/VB. +FM.   She has DDD on a partial opioid antagonist and needs an epidural for labor. She can't get any other pain meds.         Review of patient's allergies indicates:   Allergen Reactions    Augmentin [amoxicillin-pot clavulanate] Nausea And Vomiting     Other reaction(s): GI Intolerance    Bactrim [sulfamethoxazole-trimethoprim] Anaphylaxis    Sulfa (sulfonamide antibiotics) Anaphylaxis     Past Medical History:   Diagnosis Date    Depression 2015    Lexapro and Wellbrutrin for about 8months.    Infertility     Migraine [facility]     [facility]    Neurological abnormality     States she was diagnosed with Chiari malformation when being evaluated for migraines. Was told it is benign.     Past Surgical History:   Procedure Laterality Date    breast reduction      BREAST SURGERY      reduction     laparscopic surgery      endometriosis     Family History   Problem Relation Age of Onset    Prostate cancer Father     Breast cancer Mother 62        negative genetic testing     Colon cancer Maternal Aunt      Social History     Tobacco Use    Smoking status: Never Smoker    Smokeless tobacco: Never Used   Substance Use Topics    Alcohol use: No    Drug use: No     Review of Systems   Constitutional: Negative for fever.   Eyes: Negative for visual disturbance.   Respiratory: Negative for shortness of breath.    Cardiovascular: Negative for chest pain.   Gastrointestinal: Positive for abdominal pain.   Genitourinary: Negative for vaginal bleeding.   Musculoskeletal: Positive for back pain.   Skin: Negative for rash.   Neurological: Negative for light-headedness.   Hematological: Does not bruise/bleed easily.    Psychiatric/Behavioral: The patient is not nervous/anxious.        Physical Exam     Initial Vitals   BP Pulse Resp Temp SpO2   -- -- -- -- --      MAP       --          VSS Afeb  Physical Exam    Vitals reviewed.  Constitutional: She appears well-developed and well-nourished. She is not diaphoretic. No distress.   Cardiovascular: Normal rate, regular rhythm, normal heart sounds and intact distal pulses.   Pulmonary/Chest: Breath sounds normal.   Abdominal: Soft. There is no tenderness. There is no guarding.   Neurological: She is alert and oriented to person, place, and time. She has normal strength. No sensory deficit.   Psychiatric: She has a normal mood and affect. Her behavior is normal. Judgment and thought content normal.     OB LABOR EXAM:   Pre-Term Labor: No.   Membranes ruptured: No.   Method: Sterile vaginal exam per MD.       Dilatation: 1.   Station: -3.   Effacement: 50%.             ED Course   Fetal non-stress test  Date/Time: 7/17/2019 6:29 PM  Performed by: Jalyn Moncada MD  Authorized by: Jalyn Moncada MD     Nonstress Test:     Variability:  6-25 BPM    Decelerations:  None    Accelerations:  15 bpm    Baseline:  135    Contractions:  Regular    Contraction Frequency:  2  Biophysical Profile:     Nonstress Test Interpretation: reactive      Overall Impression:  Reassuring      Labs Reviewed - No data to display       Imaging Results    None          Medical Decision Making:   ED Management:  SVE unchanged from clinic, 1/50%/-3, very posterior  Pt desires a 2 hour rule out and IVF to help with contraction frequency              Attending Attestation:   Physician Attestation Statement for Resident:  As the supervising MD   Physician Attestation Statement: I have personally seen and examined this patient.   I agree with the above history. -:   As the supervising MD I agree with the above PE.    As the supervising MD I agree with the above treatment, course, plan, and disposition.   -:    NST  I independently reviewed the fetal non-stress test with the following interpretation:  135 BPM baseline  Variability: moderate  Accelerations: present  Decelerations: absent  Contractions: occasional  Category 1    Clinical Interpretation:reactive    Patient evaluated and found to be stable, agree with resident's assessment of latent phase labor with no cervical change after observation and plan to discharge to home with labor precautions. Anesthesia consulted re use of Naloxone, patient will discuss with prescribing physician.  I was personally present during the critical portions of the procedure(s) performed by the resident and was immediately available in the ED to provide services and assistance as needed during the entire procedure.  I have reviewed the following: old records at this facility.                       Clinical Impression:       ICD-10-CM ICD-9-CM   1. Uterine contractions during pregnancy O62.2 661.20   2. 36 weeks gestation of pregnancy Z3A.36 V22.2   3. Degenerative disc disease, lumbar M51.36 722.52                                Apolonia Obrien MD  07/19/19 [id number]

## 2019-07-18 ENCOUNTER — PATIENT MESSAGE (OUTPATIENT)
Dept: OBSTETRICS AND GYNECOLOGY | Facility: CLINIC | Age: 34
End: 2019-07-18

## 2019-07-18 NOTE — DISCHARGE INSTRUCTIONS
Call clinic [phone] or L & D after hours at [phone] for vaginal bleeding, leakage of fluids, regular contractions every 5 mins for 2 hours, decreased fetal movements ( 10 kicks in 2 hours), headache not relieved by Tylenol, blurry vision, or temp of 100.4 or greater.  Begin doing fetal kick counts, at least 10 movements in 2 hours starting at 28 weeks gestation.  Keep next clinic appointment

## 2019-07-23 ENCOUNTER — ROUTINE PRENATAL (OUTPATIENT)
Dept: OBSTETRICS AND GYNECOLOGY | Facility: CLINIC | Age: 34
End: 2019-07-23
Attending: OBSTETRICS & GYNECOLOGY
Payer: COMMERCIAL

## 2019-07-23 ENCOUNTER — OFFICE VISIT (OUTPATIENT)
Dept: ANESTHESIOLOGY | Facility: OTHER | Age: 34
End: 2019-07-23
Attending: OBSTETRICS & GYNECOLOGY
Payer: COMMERCIAL

## 2019-07-23 VITALS — WEIGHT: 141.56 LBS | SYSTOLIC BLOOD PRESSURE: 98 MMHG | BODY MASS INDEX: 21.7 KG/M2 | DIASTOLIC BLOOD PRESSURE: 70 MMHG

## 2019-07-23 DIAGNOSIS — Z34.90 PREGNANCY WITH ONE FETUS, ANTEPARTUM: ICD-10-CM

## 2019-07-23 PROCEDURE — 0502F SUBSEQUENT PRENATAL CARE: CPT | Mod: CPTII,S$GLB,, | Performed by: OBSTETRICS & GYNECOLOGY

## 2019-07-23 PROCEDURE — 0502F PR SUBSEQUENT PRENATAL CARE: ICD-10-PCS | Mod: CPTII,S$GLB,, | Performed by: OBSTETRICS & GYNECOLOGY

## 2019-07-23 NOTE — CONSULTS
"Outpatient OB Anesthesia Consult      Date and Time: 2019 10:59 AM     Request from: Dr. Rodriguez    CC requesting physician or midwife: Dr. Rodriguez     Reason for Consult: Anesthetic recommendations for delivery    Initial Consult?: Yes    Chief Complaint: Intrauterine pregnancy in the setting of chronic low back pain secondary to degenerative disc disease.    HPI: Mrs. Sanchez  is a 32 yo healthcare ,  with a PMHx pertinent for chronic LBP 2/2 DDD, anxiety and chiari malformation Type I presenting for anesthesia consultation. Per pt LBP is currently moderate.  She is seen by a pain specialist for outpatient management -- has previously had epidural steroid injections which pt states was "not helpful", now on naltrexone (clinical trial) per pain physician which she states is very helpful with pain control, but she has discontinued up until delivery due to concern that continuing would interfere with the efficacy of the epidural. Per pt. Chiari malformation was dx'd several years ago and has had no issues and was told that it was benign -- denies HA, visual changes, altered mental status, problems with coordination/ambulation.  This pregnancy has otherwise been uncomplicated.    OB Hx:  G1: vaginal twin delivery with epidural -- denies complications  G2: vaginal delivery with epidural    Anesthesia Hx: Denies complications      Past medical history:    Past Medical History:   Diagnosis Date    Depression 2015    Lexapro and Wellbrutrin for about 8months.    Infertility     Migraine headache     for 4 months.before pregnancy at [facility]    Neurological abnormality     States she was diagnosed with Chiari malformation when being evaluated for migraines. Was told it is benign.       Past surgical history:    Past Surgical History:   Procedure Laterality Date    breast reduction      BREAST SURGERY      reduction     laparscopic surgery      endometriosis       Family " history:    Family History   Problem Relation Age of Onset    Prostate cancer Father     Breast cancer Mother 62        negative genetic testing     Colon cancer Maternal Aunt        Social History:    Social History     Socioeconomic History    Marital status:      Spouse name: Clay    Number of children: Not on file    Years of education: Not on file    Highest education level: Not on file   Occupational History    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   Social Needs    Financial resource strain: Not on file    Food insecurity:     Worry: Not on file     Inability: Not on file    Transportation needs:     Medical: Not on file     Non-medical: Not on file   Tobacco Use    Smoking status: Never Smoker    Smokeless tobacco: Never Used   Substance and Sexual Activity    Alcohol use: No    Drug use: No    Sexual activity: Yes     Partners: Male     Birth control/protection: None     Comment:    Lifestyle    Physical activity:     Days per week: Not on file     Minutes per session: Not on file    Stress: Not on file   Relationships    Social connections:     Talks on phone: Not on file     Gets together: Not on file     Attends Jainism service: Not on file     Active member of club or organization: Not on file     Attends meetings of clubs or organizations: Not on file     Relationship status: Not on file   Other Topics Concern    Not on file   Social History Narrative    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 90 tablet 1     calcium carbonate (TUMS ORAL) Take by mouth.      escitalopram oxalate (LEXAPRO) 10 MG tablet Take 1.5 tablets (15 mg total) by mouth once daily. 45 tablet 11    IRON, CARBONYL ORAL Take by mouth.      naltrexone (DEPADE) 50 mg tablet 10 mg once daily.       ondansetron (ZOFRAN) 4 MG tablet Take 1 tablet (4 mg total) by mouth every 6 (six) hours as needed for Nausea. 20 tablet 0    PNV no.95/ferrous fum/folic ac (PRENATAL ORAL) Take by mouth.       No current facility-administered medications on file prior to visit.        Allergies:    Augmentin [amoxicillin-pot clavulanate]; Bactrim [sulfamethoxazole-trimethoprim]; and Sulfa (sulfonamide antibiotics)    Family or personal history of anesthetic complications:     Diagnostic Studies: I have reviewed the following relevant findings as noted below:  CBC:  Lab Results   Component Value Date    WBC 8.33 07/15/2019    HGB 11.0 (L) 07/15/2019    HCT 32.7 (L) 07/15/2019    MCV 83 07/15/2019     07/15/2019      CMP:  Sodium   Date Value Ref Range Status   06/04/2019 135 (L) 136 - 145 mmol/L Final     Potassium   Date Value Ref Range Status   06/04/2019 3.9 3.5 - 5.1 mmol/L Final     Chloride   Date Value Ref Range Status   06/04/2019 104 95 - 110 mmol/L Final     CO2   Date Value Ref Range Status   06/04/2019 24 23 - 29 mmol/L Final     Glucose   Date Value Ref Range Status   06/04/2019 63 (L) 70 - 110 mg/dL Final     BUN, Bld   Date Value Ref Range Status   06/04/2019 9 6 - 20 mg/dL Final     Creatinine   Date Value Ref Range Status   06/04/2019 0.7 0.5 - 1.4 mg/dL Final     Calcium   Date Value Ref Range Status   06/04/2019 8.5 (L) 8.7 - 10.5 mg/dL Final     Total Protein   Date Value Ref Range Status   06/04/2019 6.1 6.0 - 8.4 g/dL Final     Albumin   Date Value Ref Range Status   06/04/2019 2.9 (L) 3.5 - 5.2 g/dL Final     Total Bilirubin   Date Value Ref Range Status   06/04/2019 0.2 0.1 - 1.0 mg/dL Final     Comment:     For infants and newborns,  interpretation of results should be based  on gestational age, weight and in agreement with clinical  observations.  Premature Infant recommended reference ranges:  Up to 24 hours.............<8.0 mg/dL  Up to 48 hours............<12.0 mg/dL  3-5 days..................<15.0 mg/dL  6-29 days.................<15.0 mg/dL       Alkaline Phosphatase   Date Value Ref Range Status   06/04/2019 189 (H) 55 - 135 U/L Final     AST   Date Value Ref Range Status   06/18/2019 10 10 - 40 U/L Final     ALT   Date Value Ref Range Status   06/18/2019 10 10 - 44 U/L Final     Anion Gap   Date Value Ref Range Status   06/04/2019 7 (L) 8 - 16 mmol/L Final     eGFR if    Date Value Ref Range Status   06/04/2019 >60 >60 mL/min/1.73 m^2 Final     eGFR if non    Date Value Ref Range Status   06/04/2019 >60 >60 mL/min/1.73 m^2 Final     Comment:     Calculation used to obtain the estimated glomerular filtration  rate (eGFR) is the CKD-EPI equation.        BMP:   Lab Results   Component Value Date     (L) 06/04/2019    K 3.9 06/04/2019     06/04/2019    CO2 24 06/04/2019    BUN 9 06/04/2019    CREATININE 0.7 06/04/2019    CALCIUM 8.5 (L) 06/04/2019    ANIONGAP 7 (L) 06/04/2019    ESTGFRAFRICA >60 06/04/2019    EGFRNONAA >60 06/04/2019     MRI: Image and report to be scanned into Epic     Pertinent findings: moderate lumbar spondylosis L1-2 L3-4, and L4-5, no significant disc protrusion or canal stenosis, mild bilateral neural foraimnal stenosis inferiorly at L4-5.    Review of Systems:   Constitutional: Negative for fever and chills; well appearing female  Eyes: no visual changes  Ear, nose, mouth and throat: no loose or broken teeth  Cardiovascular: no chest pain  Respiratory: no shortness of breath  Gastrointestinal: no nausea or vomiting  Genitourinary: no dysuria  Musculoskeletal: no joint pain  Skin: warm and dry, no rashes  Neurologic: no seizures  Psychiatric: no anxiety or  depression  Endocrinology: no heat or cold intolerance  Hematologic: does not bruise or bleed easily      Physical Exam:  Vitals:    Heart Rate: 88   Blood Pressure: 98/70   Respiratory Rate: 16   Oxygen Saturation:99%  Constitutional: alert, no distress  Eyes: normal lids, pupils symmetric  Ear, nose, mouth and throat: Mallampati I, normal thyromental distance, normal lips, teeth, gums and tongue   Cardiovascular: normal rate, no murmurs  Respiratory: normal effort, no wheezes  Musculoskeletal: normal gait, normal range of motion  Skin: no rashes, no induration  Neurologic: normal reflexes and sensation  Psychiatric: oriented to person, place, time; normal affect    ASA Score: 2    Diagnosis: Intrauterine pregnancy in the setting of chronic low back pain secondary to degenerative disc disease.    Assessment and Plan:  I had a detailed conversation with Mrs. Sanchez and her  regarding the anesthetic options available to her. Her plan is a repeat vaginal delivery with neuraxial analgesia.  She was concerned about the naltrexone and its interaction with the functionality of the epidural and I assured her that this would not be an issue, especially since she discontinued the medication a week ago.    I discussed the risks associated with neuraxial analgesia including: bleeding, infection, damage to surrounding structures, epidural failure, back pain and post-dural puncture headache. Patient agrees and acknowledges understanding.     Complexity: Moderate    Entertained and answered all questions to the patient's satisfaction.    Discussed with Dr. Chavira.    Lucrecia Monroy MD  Obstetric Anesthesiology Fellow [phone]

## 2019-07-30 ENCOUNTER — HOSPITAL ENCOUNTER (INPATIENT)
Facility: OTHER | Age: 34
LOS: 1 days | Discharge: HOME | End: 2019-07-31
Attending: OBSTETRICS & GYNECOLOGY | Admitting: OBSTETRICS & GYNECOLOGY
Payer: COMMERCIAL

## 2019-07-30 ENCOUNTER — ANESTHESIA EVENT (OUTPATIENT)
Dept: OBSTETRICS AND GYNECOLOGY | Facility: OTHER | Age: 34
End: 2019-07-30
Payer: COMMERCIAL

## 2019-07-30 ENCOUNTER — ROUTINE PRENATAL (OUTPATIENT)
Dept: OBSTETRICS AND GYNECOLOGY | Facility: CLINIC | Age: 34
End: 2019-07-30
Attending: OBSTETRICS & GYNECOLOGY
Payer: COMMERCIAL

## 2019-07-30 ENCOUNTER — ANESTHESIA (OUTPATIENT)
Dept: OBSTETRICS AND GYNECOLOGY | Facility: OTHER | Age: 34
End: 2019-07-30
Payer: COMMERCIAL

## 2019-07-30 VITALS — SYSTOLIC BLOOD PRESSURE: 100 MMHG | DIASTOLIC BLOOD PRESSURE: 78 MMHG

## 2019-07-30 DIAGNOSIS — Z3A.38 38 WEEKS GESTATION OF PREGNANCY: ICD-10-CM

## 2019-07-30 DIAGNOSIS — O47.9 UTERINE CONTRACTIONS DURING PREGNANCY: Primary | ICD-10-CM

## 2019-07-30 LAB
ABO + RH BLD: NORMAL
BASOPHILS # BLD AUTO: 0.05 K/UL (ref 0–0.2)
BASOPHILS NFR BLD: 0.4 % (ref 0–1.9)
BLD GP AB SCN CELLS X3 SERPL QL: NORMAL
DIFFERENTIAL METHOD: ABNORMAL
EOSINOPHIL # BLD AUTO: 0.1 K/UL (ref 0–0.5)
EOSINOPHIL NFR BLD: 1 % (ref 0–8)
ERYTHROCYTE [DISTWIDTH] IN BLOOD BY AUTOMATED COUNT: 12.9 % (ref 11.5–14.5)
HCT VFR BLD AUTO: 32.2 % (ref 37–48.5)
HGB BLD-MCNC: 10.8 G/DL (ref 12–16)
IMM GRANULOCYTES # BLD AUTO: 0.1 K/UL (ref 0–0.04)
IMM GRANULOCYTES NFR BLD AUTO: 0.9 % (ref 0–0.5)
LYMPHOCYTES # BLD AUTO: 1.7 K/UL (ref 1–4.8)
LYMPHOCYTES NFR BLD: 14.7 % (ref 18–48)
MCH RBC QN AUTO: 27.1 PG (ref 27–31)
MCHC RBC AUTO-ENTMCNC: 33.5 G/DL (ref 32–36)
MCV RBC AUTO: 81 FL (ref 82–98)
MONOCYTES # BLD AUTO: 0.9 K/UL (ref 0.3–1)
MONOCYTES NFR BLD: 8.3 % (ref 4–15)
NEUTROPHILS # BLD AUTO: 8.4 K/UL (ref 1.8–7.7)
NEUTROPHILS NFR BLD: 74.7 % (ref 38–73)
NRBC BLD-RTO: 0 /100 WBC
PLATELET # BLD AUTO: 210 K/UL (ref 150–350)
PMV BLD AUTO: 9.7 FL (ref 9.2–12.9)
RBC # BLD AUTO: 3.98 M/UL (ref 4–5.4)
WBC # BLD AUTO: 11.27 K/UL (ref 3.9–12.7)

## 2019-07-30 PROCEDURE — 62326 NJX INTERLAMINAR LMBR/SAC: CPT | Performed by: ANESTHESIOLOGY

## 2019-07-30 PROCEDURE — 59400 PR FULL ROUT OBSTE CARE,VAGINAL DELIV: ICD-10-PCS | Mod: ,,, | Performed by: OBSTETRICS & GYNECOLOGY

## 2019-07-30 PROCEDURE — 72100002 HC LABOR CARE, 1ST 8 HOURS

## 2019-07-30 PROCEDURE — 59400 OBSTETRICAL CARE: CPT | Mod: ,,, | Performed by: OBSTETRICS & GYNECOLOGY

## 2019-07-30 PROCEDURE — 59409 PRA ETRICAL CARE,VAG DELIV ONLY: ICD-10-PCS | Mod: QY,,, | Performed by: ANESTHESIOLOGY

## 2019-07-30 PROCEDURE — 86901 BLOOD TYPING SEROLOGIC RH(D): CPT

## 2019-07-30 PROCEDURE — 25000003 PHARM REV CODE 250: Performed by: STUDENT IN AN ORGANIZED HEALTH CARE EDUCATION/TRAINING PROGRAM

## 2019-07-30 PROCEDURE — 85025 COMPLETE CBC W/AUTO DIFF WBC: CPT

## 2019-07-30 PROCEDURE — 72200005 HC VAGINAL DELIVERY LEVEL II

## 2019-07-30 PROCEDURE — 11000001 HC ACUTE MED/SURG PRIVATE ROOM

## 2019-07-30 PROCEDURE — 27200710 HC EPIDURAL INFUSION PUMP SET: Performed by: ANESTHESIOLOGY

## 2019-07-30 PROCEDURE — 59409 OBSTETRICAL CARE: CPT | Mod: QY,,, | Performed by: ANESTHESIOLOGY

## 2019-07-30 PROCEDURE — 0502F PR SUBSEQUENT PRENATAL CARE: ICD-10-PCS | Mod: CPTII,S$GLB,, | Performed by: OBSTETRICS & GYNECOLOGY

## 2019-07-30 PROCEDURE — 51702 INSERT TEMP BLADDER CATH: CPT

## 2019-07-30 PROCEDURE — 25000003 PHARM REV CODE 250: Performed by: ANESTHESIOLOGY

## 2019-07-30 PROCEDURE — 72100003 HC LABOR CARE, EA. ADDL. 8 HRS

## 2019-07-30 PROCEDURE — 0502F SUBSEQUENT PRENATAL CARE: CPT | Mod: ,,, | Performed by: OBSTETRICS & GYNECOLOGY

## 2019-07-30 PROCEDURE — 0502F PR SUBSEQUENT PRENATAL CARE: ICD-10-PCS | Mod: ,,, | Performed by: OBSTETRICS & GYNECOLOGY

## 2019-07-30 PROCEDURE — 27800517 HC TRAY,EPIDURAL-CONTINUOUS: Performed by: ANESTHESIOLOGY

## 2019-07-30 PROCEDURE — 63600175 PHARM REV CODE 636 W HCPCS: Performed by: STUDENT IN AN ORGANIZED HEALTH CARE EDUCATION/TRAINING PROGRAM

## 2019-07-30 PROCEDURE — 72200004 HC VAGINAL DELIVERY LEVEL I

## 2019-07-30 PROCEDURE — 0502F SUBSEQUENT PRENATAL CARE: CPT | Mod: CPTII,S$GLB,, | Performed by: OBSTETRICS & GYNECOLOGY

## 2019-07-30 RX ORDER — ONDANSETRON 8 MG/1
8 TABLET, ORALLY DISINTEGRATING ORAL EVERY 8 HOURS PRN
Status: DISCONTINUED | OUTPATIENT
Start: 2019-07-30 | End: 2019-07-31 | Stop reason: HOSPADM

## 2019-07-30 RX ORDER — MISOPROSTOL 200 UG/1
600 TABLET ORAL
Status: DISCONTINUED | OUTPATIENT
Start: 2019-07-30 | End: 2019-07-31 | Stop reason: HOSPADM

## 2019-07-30 RX ORDER — SODIUM CHLORIDE 9 MG/ML
INJECTION, SOLUTION INTRAVENOUS
Status: DISCONTINUED | OUTPATIENT
Start: 2019-07-30 | End: 2019-07-31 | Stop reason: HOSPADM

## 2019-07-30 RX ORDER — ACETAMINOPHEN 325 MG/1
650 TABLET ORAL EVERY 6 HOURS PRN
Status: DISCONTINUED | OUTPATIENT
Start: 2019-07-30 | End: 2019-07-31 | Stop reason: HOSPADM

## 2019-07-30 RX ORDER — BUPIVACAINE HYDROCHLORIDE 2.5 MG/ML
INJECTION, SOLUTION EPIDURAL; INFILTRATION; INTRACAUDAL
Status: DISPENSED
Start: 2019-07-30 | End: 2019-07-31

## 2019-07-30 RX ORDER — FENTANYL/BUPIVACAINE/NS/PF 2MCG/ML-.1
PLASTIC BAG, INJECTION (ML) INJECTION CONTINUOUS
Status: CANCELLED | OUTPATIENT
Start: 2019-07-30

## 2019-07-30 RX ORDER — OXYTOCIN/RINGER'S LACTATE 20/1000 ML
41.65 PLASTIC BAG, INJECTION (ML) INTRAVENOUS CONTINUOUS
Status: ACTIVE | OUTPATIENT
Start: 2019-07-30 | End: 2019-07-30

## 2019-07-30 RX ORDER — FENTANYL/BUPIVACAINE/NS/PF 2MCG/ML-.1
PLASTIC BAG, INJECTION (ML) INJECTION
Status: DISPENSED
Start: 2019-07-30 | End: 2019-07-30

## 2019-07-30 RX ORDER — SODIUM CITRATE AND CITRIC ACID MONOHYDRATE 334; 500 MG/5ML; MG/5ML
30 SOLUTION ORAL ONCE
Status: CANCELLED | OUTPATIENT
Start: 2019-07-30 | End: 2019-07-30

## 2019-07-30 RX ORDER — OXYTOCIN/RINGER'S LACTATE 20/1000 ML
2 PLASTIC BAG, INJECTION (ML) INTRAVENOUS CONTINUOUS
Status: DISCONTINUED | OUTPATIENT
Start: 2019-07-30 | End: 2019-07-31 | Stop reason: HOSPADM

## 2019-07-30 RX ORDER — DOCUSATE SODIUM 100 MG/1
200 CAPSULE, LIQUID FILLED ORAL 2 TIMES DAILY PRN
Status: DISCONTINUED | OUTPATIENT
Start: 2019-07-30 | End: 2019-07-31 | Stop reason: HOSPADM

## 2019-07-30 RX ORDER — HYDROCORTISONE 25 MG/G
CREAM TOPICAL 3 TIMES DAILY PRN
Status: DISCONTINUED | OUTPATIENT
Start: 2019-07-30 | End: 2019-07-31 | Stop reason: HOSPADM

## 2019-07-30 RX ORDER — FAMOTIDINE 10 MG/ML
20 INJECTION INTRAVENOUS ONCE
Status: CANCELLED | OUTPATIENT
Start: 2019-07-30 | End: 2019-07-30

## 2019-07-30 RX ORDER — SODIUM CHLORIDE, SODIUM LACTATE, POTASSIUM CHLORIDE, CALCIUM CHLORIDE 600; 310; 30; 20 MG/100ML; MG/100ML; MG/100ML; MG/100ML
INJECTION, SOLUTION INTRAVENOUS CONTINUOUS
Status: DISCONTINUED | OUTPATIENT
Start: 2019-07-30 | End: 2019-07-31 | Stop reason: HOSPADM

## 2019-07-30 RX ORDER — OXYTOCIN/RINGER'S LACTATE 20/1000 ML
41.7 PLASTIC BAG, INJECTION (ML) INTRAVENOUS CONTINUOUS
Status: DISPENSED | OUTPATIENT
Start: 2019-07-30 | End: 2019-07-30

## 2019-07-30 RX ORDER — HYDROCODONE BITARTRATE AND ACETAMINOPHEN 10; 325 MG/1; MG/1
1 TABLET ORAL EVERY 4 HOURS PRN
Status: DISCONTINUED | OUTPATIENT
Start: 2019-07-30 | End: 2019-07-31 | Stop reason: HOSPADM

## 2019-07-30 RX ORDER — IBUPROFEN 600 MG/1
600 TABLET ORAL EVERY 6 HOURS
Status: DISCONTINUED | OUTPATIENT
Start: 2019-07-30 | End: 2019-07-31 | Stop reason: HOSPADM

## 2019-07-30 RX ORDER — DIPHENHYDRAMINE HCL 25 MG
25 CAPSULE ORAL EVERY 4 HOURS PRN
Status: DISCONTINUED | OUTPATIENT
Start: 2019-07-30 | End: 2019-07-31 | Stop reason: HOSPADM

## 2019-07-30 RX ORDER — FENTANYL/BUPIVACAINE/NS/PF 2MCG/ML-.1
PLASTIC BAG, INJECTION (ML) INJECTION CONTINUOUS PRN
Status: DISCONTINUED | OUTPATIENT
Start: 2019-07-30 | End: 2019-07-30

## 2019-07-30 RX ORDER — OXYTOCIN/RINGER'S LACTATE 20/1000 ML
333 PLASTIC BAG, INJECTION (ML) INTRAVENOUS CONTINUOUS
Status: ACTIVE | OUTPATIENT
Start: 2019-07-30 | End: 2019-07-30

## 2019-07-30 RX ORDER — BUPROPION HYDROCHLORIDE 75 MG/1
75 TABLET ORAL 3 TIMES DAILY
Status: DISCONTINUED | OUTPATIENT
Start: 2019-07-30 | End: 2019-07-31 | Stop reason: HOSPADM

## 2019-07-30 RX ORDER — LIDOCAINE HYDROCHLORIDE AND EPINEPHRINE 15; 5 MG/ML; UG/ML
INJECTION, SOLUTION EPIDURAL
Status: COMPLETED | OUTPATIENT
Start: 2019-07-30 | End: 2019-07-30

## 2019-07-30 RX ORDER — DIPHENHYDRAMINE HYDROCHLORIDE 50 MG/ML
25 INJECTION INTRAMUSCULAR; INTRAVENOUS EVERY 4 HOURS PRN
Status: DISCONTINUED | OUTPATIENT
Start: 2019-07-30 | End: 2019-07-31 | Stop reason: HOSPADM

## 2019-07-30 RX ORDER — HYDROCODONE BITARTRATE AND ACETAMINOPHEN 5; 325 MG/1; MG/1
1 TABLET ORAL EVERY 4 HOURS PRN
Status: DISCONTINUED | OUTPATIENT
Start: 2019-07-30 | End: 2019-07-31 | Stop reason: HOSPADM

## 2019-07-30 RX ADMIN — HYDROCODONE BITARTRATE AND ACETAMINOPHEN 1 TABLET: 10; 325 TABLET ORAL at 04:07

## 2019-07-30 RX ADMIN — DOCUSATE SODIUM 200 MG: 100 CAPSULE, LIQUID FILLED ORAL at 09:07

## 2019-07-30 RX ADMIN — HYDROCODONE BITARTRATE AND ACETAMINOPHEN 1 TABLET: 10; 325 TABLET ORAL at 08:07

## 2019-07-30 RX ADMIN — Medication 10 ML/HR: at 09:07

## 2019-07-30 RX ADMIN — Medication 6 ML: at 12:07

## 2019-07-30 RX ADMIN — LIDOCAINE HYDROCHLORIDE,EPINEPHRINE BITARTRATE 3 ML: 15; .005 INJECTION, SOLUTION EPIDURAL; INFILTRATION; INTRACAUDAL; PERINEURAL at 10:07

## 2019-07-30 RX ADMIN — Medication 2 MILLI-UNITS/MIN: at 11:07

## 2019-07-30 RX ADMIN — IBUPROFEN 600 MG: 600 TABLET ORAL at 11:07

## 2019-07-30 RX ADMIN — IBUPROFEN 600 MG: 600 TABLET ORAL at 05:07

## 2019-07-30 NOTE — PROGRESS NOTES
To bedside for AROM  Cervix 4/80/-2, fetal vertex well engaged. AROM with clear fluid.  Well tolerated.    Annmarie Bhatia MD  OB/GYN, PGY-4

## 2019-07-30 NOTE — DISCHARGE INSTRUCTIONS
Breastfeeding Discharge Instructions       Feed the baby at the earliest sign of hunger or comfort  o Hands to mouth, sucking motions  o Rooting or searching for something to suck on  o Dont wait for crying - it is a sign of distress     The feedings may be 8-12 times per 24hrs and will not follow a schedule   Avoid pacifiers and bottles for the first 4 weeks   Alternate the breast you start the feeding with, or start with the breast that feels the fullest   Switch breasts when the baby takes himself off the breast or falls asleep   Keep offering breasts until the baby looks full, no longer gives hunger signs, and stays asleep when placed on his back in the crib   If the baby is sleepy and wont wake for a feeding, put the baby skin-to-skin dressed in a diaper against the mothers bare chest   Sleep near your baby   The baby should be positioned and latched on to the breast correctly  o Chest-to-chest, chin in the breast  o Babys lips are flipped outward  o Babys mouth is stretched open wide like a shout  o Babys sucking should feel like tugging to the mother  - The baby should be drinking at the breast:  o You should hear swallowing or gulping throughout the feeding  o You should see milk on the babys lips when he comes off the breast  o Your breasts should be softer when the baby is finished feeding  o The baby should look relaxed at the end of feedings  o After the 4th day and your milk is in:  o The babys poop should turn bright yellow and be loose, watery, and seedy  o The baby should have at least 3-4 poops the size of the palm of your hand per day  o The baby should have at least 5-6 wet diapers per day  o The urine should be light yellow in color  You should drink when you are thirsty and eat a healthy diet when you are    hungry.     Take naps to get the rest you need.   Take medications and/or drink alcohol only with permission of your obstetrician    or the babys pediatrician.  You can  also call the Infant Risk Center,   ([phone]), Monday-Friday, 8am-5pm Central time, to get the most   up-to-date evidence-based information on the use of medications during   pregnancy and breastfeeding.      The baby should be examined by a pediatrician at 3-5 days of age.  Once your   milk comes in, the baby should be gaining at least ½ - 1oz each day and should be back to birthweight no later than 10-14 days of age.          Community Resources    [facility] Breastfeeding Warmline: [phone]   Local [facility] clinics: provide incentives and breastpumps to eligible mothers  La L[employer]he [employer] (LLLI):  mother-to-mother support group website        www.[employer].BrandBoards  Local La Leche League mother-to-mother support groups:        www.[employer]        La Leche League [facility]   Dr. Anmol Srinivasan website for latch videos and general information:        www.breastfeedinginc.ca  [facility] Risk [facility] is a call center that provides information about the safety of taking medications while breastfeeding.  Call [phone], M-F, 8am-5pm, CT.  International Lactation Consultant Association provides resources for assistance:        www.ilca.org  [place] Breastfeeding Coalition provides informationand resources for parents  and the community    www.LaBreastfeedingSupport.org     Ariella Graves is a mom-to-mom support group:                             www.[employer].com//breastfeedng-support/  [employer] for Healthy Babies:  8-225-659-BABY(8843)  [employer]: a breastfeeding support group for women of color, [phone]

## 2019-07-30 NOTE — L&D DELIVERY NOTE
[facility]  Vaginal Delivery   Operative Note    SUMMARY     Normal spontaneous vaginal delivery of live infant, was placed on mothers abdomen for skin to skin and bulb suctioning performed. Infant delivered position OA over perineum. Nuchal cord: No.  Spontaneous delivery of placenta and IV pitocin given noting good uterine tone.  1st degree laceration noted, repaired in the standard fashion with 2.0 Vicryl on CT and 3.0 Vicryl on SH.  Patient tolerated delivery well. Sponge needle and lap counted correctly x2.    Indications: <principal problem not specified>  Pregnancy complicated by:   Patient Active Problem List   Diagnosis    Anxiety    At risk for depressed mood during postpartum period    Headache    Chiari I malformation    Allergic reaction caused by a drug    Pregnancy with one fetus, antepartum    38 weeks gestation of pregnancy     Admitting GA: 38w2d    Delivery Information for  Timothy Sanchez    Birth information:  YOB: 2021   Time of birth: 1:37 PM   Sex: female   Head Delivery Date/Time: 7/30/2019  1:37 PM   Delivery type: Vaginal, Spontaneous   Gestational Age: 38w2d    Delivery Providers    Delivering clinician:  Jalyn Moncada MD   Provider Role    EARLINE Irizarry RN Kristy Rascoe, RN Victoria Knox [place], MD Miranda K. Long, MD             Measurements    Weight:  3062 g  Length:  50.8 cm  Head circumference:  34.3 cm  Chest circumference:  31.8 cm         Apgars    Living status:  Living  Apgars:   1 min.:   5 min.:   10 min.:   15 min.:   20 min.:     Skin color:   1  1       Heart rate:   2  2       Reflex irritability:   2  2       Muscle tone:   2  2       Respiratory effort:   2  2       Total:   9  9       Apgars assigned by:  JOAN BORRERO RN         Operative Delivery    Forceps attempted?:  No  Vacuum extractor attempted?:  No         Shoulder Dystocia    Shoulder dystocia present?:  No            Presentation    Presentation:  Vertex           Interventions/Resuscitation    Method:  Bulb Suctioning, Tactile Stimulation       Cord    Vessels:  3 vessels  Complications:  None  Delayed Cord Clamping?:  No  Cord Clamped Date/Time:  2019  1:38 PM  Cord Blood Disposition:  Lab, Sent with Baby  Gases Sent?:  No  Stem Cell Collection (by MD):  No       Placenta    Placenta delivery date/time:  2019 1611  Placenta removal:  Spontaneous  Placenta appearance:  Intact  Placenta disposition:  discarded           Labor Events:       labor: No     Labor Onset Date/Time:         Dilation Complete Date/Time: 2019 13:23     Start Pushing Date/Time:       Rupture Date/Time:              Rupture type:           Fluid Amount:        Fluid Color:        Fluid Odor:        Membrane Status (PeriCalm): ARM (Artificial Rupture)      Rupture Date/Time (PeriCalm): 2019 10:37:00      Fluid Amount (PeriCalm): Moderate      Fluid Color (PeriCalm): Clear       steroids: None     Antibiotics given for GBS: No     Induction: none     Indications for induction:        Augmentation: oxytocin;amniotomy     Indications for augmentation: Ineffective Contraction Pattern     Labor complications: None     Additional complications:          Cervical ripening:                     Delivery:      Episiotomy: None     Indication for Episiotomy:       Perineal Lacerations: 1st Repaired:  Yes   Periurethral Laceration:   Repaired:     Labial Laceration:   Repaired:     Sulcus Laceration:   Repaired:     Vaginal Laceration:   Repaired:     Cervical Laceration:   Repaired:     Repair suture:       Repair # of packets: 2     Last Value - EBL - Nursing (mL): 150     Sum - EBL - Nursing (mL): 150     Last Value - EBL - Anesthesia (mL):      Calculated QBL (mL):        Vaginal Sweep Performed: Yes     Surgicount Correct: Yes       Other providers:       Anesthesia    Method:  Epidural          Details (if  applicable):  Trial of Labor      Categorization:      Priority:     Indications for :     Incision Type:       Additional  information:  Forceps:    Vacuum:    Breech:    Observed anomalies    Other (Comments):           LAURA Torres PGY1

## 2019-07-30 NOTE — H&P
HISTORY AND PHYSICAL                                                OBSTETRICS          Subjective:       Zandra Sanchez is a 33 y.o.  female with IUP at 38w2d weeks gestation who is sent from clinic for labor.    Patient reports contractions, denies vaginal bleeding, denies LOF.   Fetal Movement: normal.    This IUP is complicated by maternal Chiari I malformation, anxiety, and depression..    Review of Systems   Constitutional: Negative for appetite change, chills, fatigue and fever.   Respiratory: Negative for cough and shortness of breath.    Cardiovascular: Negative for chest pain and leg swelling.   Gastrointestinal: Positive for abdominal pain. Negative for bloating, constipation, diarrhea, nausea and vomiting.   Endocrine: Negative for diabetes.   Genitourinary: Negative for dysuria, hematuria, pelvic pain, vaginal discharge and vaginal pain.   Musculoskeletal: Positive for back pain.   Integumentary:  Negative for breast mass, nipple discharge and breast skin changes.   Breast: Negative for mass, nipple discharge and skin changes         PMHx:   Past Medical History:   Diagnosis Date    Depression 2015    Lexapro and Wellbrutrin for about 8months.    Infertility     Migraine headache     for 4 months.before pregnancy at [facility] of [facility]    Neurological abnormality     States she was diagnosed with Chiari malformation when being evaluated for migraines. Was told it is benign.       PSHx:   Past Surgical History:   Procedure Laterality Date    breast reduction      BREAST SURGERY      reduction     laparscopic surgery      endometriosis       All:   Review of patient's allergies indicates:   Allergen Reactions    Augmentin [amoxicillin-pot clavulanate] Nausea And Vomiting     Other reaction(s): GI Intolerance    Bactrim [sulfamethoxazole-trimethoprim] Anaphylaxis    Sulfa (sulfonamide antibiotics) Anaphylaxis       Meds:   Medications Prior to Admission   Medication Sig Dispense  Refill Last Dose    buPROPion (WELLBUTRIN) 75 MG tablet TAKE 1 TABLET (75 MG TOTAL) BY MOUTH 3 (THREE) TIMES DAILY. 90 tablet 1 Not Taking    calcium carbonate (TUMS ORAL) Take by mouth.   Not Taking    escitalopram oxalate (LEXAPRO) 10 MG tablet Take 1.5 tablets (15 mg total) by mouth once daily. 45 tablet 11 Not Taking    IRON, CARBONYL ORAL Take by mouth.   Not Taking    naltrexone (DEPADE) 50 mg tablet 10 mg once daily.    Not Taking    ondansetron (ZOFRAN) 4 MG tablet Take 1 tablet (4 mg total) by mouth every 6 (six) hours as needed for Nausea. 20 tablet 0 Not Taking    PNV no.95/ferrous fum/folic ac (PRENATAL ORAL) Take by mouth.   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   Social Needs    Financial resource strain: Not on file    Food insecurity:     Worry: Not on file     Inability: Not on file    Transportation needs:     Medical: Not on file     Non-medical: Not on file   Tobacco Use    Smoking status: Never Smoker    Smokeless tobacco: Never Used   Substance and Sexual Activity    Alcohol use: No    Drug use: No    Sexual activity: Yes     Partners: Male     Birth control/protection: None     Comment:    Lifestyle    Physical activity:     Days per week: Not on file     Minutes per session: Not on file    Stress: Not on file   Relationships    Social connections:     Talks on phone: Not on file     Gets together: Not on file     Attends Denominational service: Not on file     Active member of club or organization: Not on file     Attends meetings of clubs or organizations: Not on file     Relationship status: Not on file   Other Topics Concern    Not on file   Social History Narrative     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[facility])    : Clay -  for the [employer]'s office    Daughters: Swapna & Kasie, DAVID for twins at 37 weeks, epidural at 7cm, NSVB       FH:   Family History   Problem Relation Age of Onset    Prostate cancer Father     Breast cancer Mother 62        negative genetic testing     Colon cancer Maternal Aunt        OBHx:   OB History    Para Term  AB Living   3 2 2 0 0 3   SAB TAB Ectopic Multiple Live Births   0 0 0 1 3      # Outcome Date GA Lbr Cooper/2nd Weight Sex Delivery Anes PTL Lv   3 Current            2 Term 17 39w0d / 00:12 2.9 kg (6 lb 6.3 oz) F Vag-Spont EPI N DAVID      Name: ROMELIA, GIRL MUKUND      Apgar1: 8  Apgar5: 9   1A Term 14 37w0d / 00:40 2.466 kg (5 lb 7 oz) F Vag-Spont EPI N DAVID      Name: Kasie      Apgar1: 9  Apgar5: 9   1B Term 14 37w0d / 00:45 2.466 kg (5 lb 7 oz) F Vag-Spont EPI N DAVID      Name: Swapna      Apgar1: 8  Apgar5: 9       Objective:       LMP 2018     There were no vitals filed for this visit.    General:   alert, appears stated age and cooperative, no apparent distress   HENT:  normocephalic, atraumatic   Eyes:  extraocular movements and conjunctivae normal   Neck:  supple, range of motion normal, no thyromegaly   Lungs:   no respiratory distress   Heart:   regular rate   Abdomen:  soft, non-tender, non-distended but gravid, no rebound or guarding    Extremities negative edema, negative erythema   FHT: 130, moderate BTBV, +accels, -decels;  Cat 1 (reassuring)                 TOCO: Q 2 minutes   Presentations: cephalic by ultrasound   Cervix:     Dilation: 4    Effacement: 75%    Station:  -2    Consistency: soft    Position: anterior     Lab Review  Blood Type A POS  GBBS: negative  Rubella: Immune  RPR: NR  HIV: negative  HepB: negative       Assessment:       38w2d weeks gestation with labor    Active Hospital Problems     Diagnosis  POA    38 weeks gestation of pregnancy [Z3A.38]  Not Applicable      Resolved Hospital Problems   No resolved problems to display.          Plan:     1. Labor   Risks, benefits, alternatives and possible complications have been discussed in detail with the patient.   - Consents signed and to chart  - Admit to Labor and Delivery unit  - Pitocin augmentation PRN   - Epidural per Anesthesia  - Draw CBC, T&S    Post-Partum Hemorrhage risk - low    2. Anxiety/Depression  - home lexapro and wellbutrin continued    3. Chiari I malformation (materna)  - s/p ambulatory anesthesia consult    Annmarie Bhatia MD  OB/GYN, PGY-4      Admitted in active labor  I have reviewed the resident's note and agree with the diagnosis and management plan

## 2019-07-30 NOTE — PROGRESS NOTES
LABOR NOTE    S:  Complaints: No.  Epidural working:  Yes   MD to bedside for cervical check.      O: /75   Pulse 65   Temp 98.5 °F (36.9 °C) (Temporal)   Resp 17   LMP 2018   SpO2 99%         FHT: 130, moderate variability, accelerations present, Cat 1 (reassuring)  CTX: q 2-3 minutes  SVE: Cervix 80/-2, fetal vertex well engaged      ASSESSMENT:   33 y.o.  at 38w2d, in stable condition, tolerating labor well    FHT reassuring    Active Hospital Problems    Diagnosis  POA    38 weeks gestation of pregnancy [Z3A.38]  Not Applicable      Resolved Hospital Problems   No resolved problems to display.     Timeline:  1000: /-2  1040: /-2, AROM clear  1200: /-2    PLAN:    Continue Close Maternal/Fetal Monitoring  Pitocin Augmentation per protocol  Recheck 2 hours or PRN    Beba Solis M.D.  [facility] PGY1        I have reviewed the resident's note and agree with the management plan

## 2019-07-30 NOTE — PROGRESS NOTES
Patient presents for appointment, very uncomfortable, reports contractions every 2-3 minutes overnight and yesterday.  Cervix with increased dilation.  Sent to L&D for admission, patient desires epidural.  Will augment as needed, but appears to be in active labor.  Charge nurse and resident notified

## 2019-07-30 NOTE — ANESTHESIA PROCEDURE NOTES
Epidural    Patient location during procedure: OB   Reason for block: primary anesthetic   Reason for block: at surgeon's request  Diagnosis: iup in labor   Start time: 7/30/2019 9:11 AM  Timeout: 7/30/2019 9:10 AM  End time: 7/30/2019 9:23 AM    Staffing  Performing Provider: Cyn Mcgill MD  Authorizing Provider: Cyn Mcgill MD        Preanesthetic Checklist  Completed: patient identified, site marked, surgical consent, pre-op evaluation, timeout performed, IV checked, risks and benefits discussed, monitors and equipment checked, anesthesia consent given, hand hygiene performed and patient being monitored  Preparation  Patient position: sitting  Prep: ChloraPrep  Patient monitoring: Pulse Ox and Blood Pressure  Epidural  Skin Anesthetic: lidocaine 1%  Skin Wheal: 3 mL  Administration type: continuous  Approach: midline  Interspace: L3-4    Injection technique: SHIRAZ air  Needle and Epidural Catheter  Needle type: Tuohy   Needle gauge: 17  Needle length: 3.5 inches  Needle insertion depth: 4.5 cm  Catheter type: springwound  Catheter size: 18 G  Catheter at skin depth: 8.5 cm  Test dose: 3 mL of lidocaine 1.5% with Epi 1-to-200,000  Additional Documentation: incremental injection, left transient paresthesia, negative aspiration for heme and CSF, no signs/symptoms of IV or SA injection, no significant pain on injection and no significant complaints from patient  Needle localization: anatomical landmarks  Medications:  Volume per aspiration: 5 mL  Time between aspirations: 3 minutes  Assessment  Ease of block: easy  Patient's tolerance of the procedure: comfortable throughout block and no complaintsNo inadvertent dural puncture with Tuohy.  Dural puncture not performed with spinal needle

## 2019-07-30 NOTE — ANESTHESIA PREPROCEDURE EVALUATION
2019  Zandra Sanchez is a 33 y.o.  female at 38w2d who presented in labor.  Patient with past medical history of anxiety, headache and Chiari 1 malformation.      OB History    Para Term  AB Living   3 2 2     3   SAB TAB Ectopic Multiple Live Births         1 3      # Outcome Date GA Lbr Cooper/2nd Weight Sex Delivery Anes PTL Lv   3 Current            2 Term 17 39w0d / 00:12 2.9 kg (6 lb 6.3 oz) F Vag-Spont EPI N DAVID   1A Term 14 37w0d / 00:40 2.466 kg (5 lb 7 oz) F Vag-Spont EPI N DAVID   1B Term 14 37w0d / 00:45 2.466 kg (5 lb 7 oz) F Vag-Spont EPI N DAVID       Wt Readings from Last 1 Encounters:   19 1618 64.2 kg (141 lb 8.6 oz)       BP Readings from Last 3 Encounters:   19 100/78   19 98/70   07/15/19 100/70       Patient Active Problem List   Diagnosis    Anxiety    At risk for depressed mood during postpartum period    Headache    Chiari I malformation    Allergic reaction caused by a drug    Pregnancy with one fetus, antepartum       Past Surgical History:   Procedure Laterality Date    breast reduction  2004    BREAST SURGERY      reduction     laparscopic surgery      endometriosis       Social History     Socioeconomic History    Marital status:      Spouse name: Clay    Number of children: Not on file    Years of education: Not on file    Highest education level: Not on file   Occupational History    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   Social Needs    Financial resource strain: Not on file    Food insecurity:     Worry: Not on file     Inability: Not on file    Transportation needs:     Medical: Not on file     Non-medical: Not on file   Tobacco Use    Smoking status: Never Smoker    Smokeless tobacco: Never Used   Substance and Sexual Activity     Alcohol use: No    Drug use: No    Sexual activity: Yes     Partners: Male     Birth control/protection: None     Comment:    Lifestyle    Physical activity:     Days per week: Not on file     Minutes per session: Not on file    Stress: Not on file   Relationships    Social connections:     Talks on phone: Not on file     Gets together: Not on file     Attends Sabianism service: Not on file     Active member of club or organization: Not on file     Attends meetings of clubs or organizations: Not on file     Relationship status: Not on file   Other Topics Concern    Not on file   Social History Narrative    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[facility])    : Clay -  for the [employer]'s office    Daughters: Swapna & Kasie, IOL for twins at 37 weeks, epidural at 7cm, NSVB         Chemistry        Component Value Date/Time     (L) 06/04/2019 0930    K 3.9 06/04/2019 0930     06/04/2019 0930    CO2 24 06/04/2019 0930    BUN 9 06/04/2019 0930    CREATININE 0.7 06/04/2019 0930    GLU 63 (L) 06/04/2019 0930        Component Value Date/Time    CALCIUM 8.5 (L) 06/04/2019 0930    ALKPHOS 189 (H) 06/04/2019 0930    AST 10 06/18/2019 1744    ALT 10 06/18/2019 1744    BILITOT 0.2 06/04/2019 0930    ESTGFRAFRICA >60 06/04/2019 0930    EGFRNONAA >60 06/04/2019 0930            Lab Results   Component Value Date    WBC 8.33 07/15/2019    HGB 11.0 (L) 07/15/2019    HCT 32.7 (L) 07/15/2019    MCV 83 07/15/2019     07/15/2019       No results for input(s): PT, INR, PROTIME, APTT in the last 72 hours.        Anesthesia Evaluation    I have reviewed the Patient Summary Reports.    I have reviewed the Nursing Notes.   I have reviewed the Medications.     Review of Systems  Anesthesia Hx:  No problems with previous Anesthesia  History of prior surgery of interest to airway management or planning: Previous anesthesia: Epidural Denies  Family Hx of Anesthesia complications.   Denies Personal Hx of Anesthesia complications.   Cardiovascular:   Exercise tolerance: good Denies Hypertension.  Denies Dysrhythmias.   Denies Angina.    Pulmonary:   Denies COPD.  Denies Asthma.    Renal/:   Denies Chronic Renal Disease.     Hepatic/GI:   Denies GERD. Denies Liver Disease.    OB/GYN/PEDS:  Planned Vaginal Delivery   3  , Para 2 Past Hx of multiple gestation of Twins.    Neurological:   Headaches (chronic, intractable) Patient also carries the diagnosis of Chiari malformation. Had epidural for previous pregnancy.   Endocrine:   Denies Diabetes.    Psych:   depression          Physical Exam  General:  Well nourished    Airway/Jaw/Neck:  Airway Findings: Mouth Opening: Normal Tongue: Normal  General Airway Assessment: Adult  Mallampati: III  Improves to II with phonation.  TM Distance: Normal, at least 6 cm  Jaw/Neck Findings:  Neck ROM: Normal ROM      Dental:  Dental Findings: In tact   Chest/Lungs:  Chest/Lungs Findings: Normal Respiratory Rate     Heart/Vascular:  Heart Findings: Rate: Normal        Mental Status:  Mental Status Findings:  Cooperative, Alert and Oriented         Anesthesia Plan  Type of Anesthesia, risks & benefits discussed:  Anesthesia Type:  CSE, epidural, general, spinal  Patient's Preference:   Intra-op Monitoring Plan: standard ASA monitors  Intra-op Monitoring Plan Comments:   Post Op Pain Control Plan: per primary service following discharge from PACU, IV/PO Opioids PRN, multimodal analgesia, epidural analgesia and intrathecal opioid  Post Op Pain Control Plan Comments:   Induction:    Beta Blocker:  Patient is not currently on a Beta-Blocker (No further documentation required).       Informed Consent: Patient understands risks and agrees with Anesthesia plan.  Questions answered. Anesthesia consent signed with patient.  ASA Score: 2     Day of Surgery Review of History & Physical:    H&P update referred to the provider.          Ready For Surgery From Anesthesia Perspective.

## 2019-07-31 VITALS
DIASTOLIC BLOOD PRESSURE: 61 MMHG | HEART RATE: 66 BPM | RESPIRATION RATE: 16 BRPM | WEIGHT: 141.56 LBS | TEMPERATURE: 98 F | HEIGHT: 67 IN | SYSTOLIC BLOOD PRESSURE: 103 MMHG | OXYGEN SATURATION: 95 % | BODY MASS INDEX: 22.22 KG/M2

## 2019-07-31 PROBLEM — Z34.90 PREGNANCY WITH ONE FETUS, ANTEPARTUM: Status: RESOLVED | Noted: 2019-02-01 | Resolved: 2019-07-31

## 2019-07-31 PROBLEM — Z3A.38 38 WEEKS GESTATION OF PREGNANCY: Status: RESOLVED | Noted: 2019-07-30 | Resolved: 2019-07-31

## 2019-07-31 LAB
BASOPHILS # BLD AUTO: 0.03 K/UL (ref 0–0.2)
BASOPHILS NFR BLD: 0.2 % (ref 0–1.9)
DIFFERENTIAL METHOD: ABNORMAL
EOSINOPHIL # BLD AUTO: 0.2 K/UL (ref 0–0.5)
EOSINOPHIL NFR BLD: 1.2 % (ref 0–8)
ERYTHROCYTE [DISTWIDTH] IN BLOOD BY AUTOMATED COUNT: 13 % (ref 11.5–14.5)
HCT VFR BLD AUTO: 32 % (ref 37–48.5)
HGB BLD-MCNC: 10.6 G/DL (ref 12–16)
IMM GRANULOCYTES # BLD AUTO: 0.06 K/UL (ref 0–0.04)
IMM GRANULOCYTES NFR BLD AUTO: 0.5 % (ref 0–0.5)
LYMPHOCYTES # BLD AUTO: 2.2 K/UL (ref 1–4.8)
LYMPHOCYTES NFR BLD: 17.1 % (ref 18–48)
MCH RBC QN AUTO: 27.6 PG (ref 27–31)
MCHC RBC AUTO-ENTMCNC: 33.1 G/DL (ref 32–36)
MCV RBC AUTO: 83 FL (ref 82–98)
MONOCYTES # BLD AUTO: 1 K/UL (ref 0.3–1)
MONOCYTES NFR BLD: 7.5 % (ref 4–15)
NEUTROPHILS # BLD AUTO: 9.5 K/UL (ref 1.8–7.7)
NEUTROPHILS NFR BLD: 73.5 % (ref 38–73)
NRBC BLD-RTO: 0 /100 WBC
PLATELET # BLD AUTO: 189 K/UL (ref 150–350)
PMV BLD AUTO: 9.7 FL (ref 9.2–12.9)
RBC # BLD AUTO: 3.84 M/UL (ref 4–5.4)
WBC # BLD AUTO: 12.89 K/UL (ref 3.9–12.7)

## 2019-07-31 PROCEDURE — 36415 COLL VENOUS BLD VENIPUNCTURE: CPT

## 2019-07-31 PROCEDURE — 25000003 PHARM REV CODE 250: Performed by: STUDENT IN AN ORGANIZED HEALTH CARE EDUCATION/TRAINING PROGRAM

## 2019-07-31 PROCEDURE — 85025 COMPLETE CBC W/AUTO DIFF WBC: CPT

## 2019-07-31 RX ORDER — HYDROCODONE BITARTRATE AND ACETAMINOPHEN 5; 325 MG/1; MG/1
1 TABLET ORAL EVERY 4 HOURS PRN
Qty: 15 TABLET | Refills: 0 | Status: SHIPPED | OUTPATIENT
Start: 2019-07-31 | End: 2021-02-12

## 2019-07-31 RX ORDER — IBUPROFEN 600 MG/1
600 TABLET ORAL EVERY 6 HOURS
Qty: 60 TABLET | Refills: 0 | Status: SHIPPED | OUTPATIENT
Start: 2019-07-31 | End: 2021-02-12

## 2019-07-31 RX ADMIN — IBUPROFEN 600 MG: 600 TABLET ORAL at 05:07

## 2019-07-31 RX ADMIN — ACETAMINOPHEN 650 MG: 325 TABLET, FILM COATED ORAL at 04:07

## 2019-07-31 RX ADMIN — IBUPROFEN 600 MG: 600 TABLET ORAL at 11:07

## 2019-07-31 RX ADMIN — HYDROCODONE BITARTRATE AND ACETAMINOPHEN 1 TABLET: 5; 325 TABLET ORAL at 09:07

## 2019-07-31 RX ADMIN — DOCUSATE SODIUM 200 MG: 100 CAPSULE, LIQUID FILLED ORAL at 09:07

## 2019-07-31 RX ADMIN — HYDROCODONE BITARTRATE AND ACETAMINOPHEN 1 TABLET: 5; 325 TABLET ORAL at 05:07

## 2019-07-31 NOTE — PROGRESS NOTES
POSTPARTUM PROGRESS NOTE     Zandra Sanchez is a 33 y.o. female PPD #1 status post Spontaneous vaginal delivery at 38w2d in a pregnancy complicated by IVF. Patient is doing well this morning. She denies nausea, vomiting, fever or chills.  Patient reports mild abdominal pain that is well relieved by oral pain medications. Lochia is mild  and stable. Patient is voiding without difficulty and ambulating with no difficulty. She has passed flatus, and has not had BM.  Patient does plan to breast feed. No contraception as cannot get pregnant.      Objective:       Temp:  [98 °F (36.7 °C)-98.9 °F (37.2 °C)] 98 °F (36.7 °C)  Pulse:  [] 64  Resp:  [17-18] 18  SpO2:  [94 %-100 %] 94 %  BP: ()/(61-88) 115/71    General:   alert, appears stated age and cooperative   Lungs:   clear to auscultation bilaterally   Heart:   regular rate and rhythm, S1, S2 normal, no murmur, click, rub or gallop   Abdomen:  soft, non-tender; bowel sounds normal; no masses,  no organomegaly   Uterus:  firm located at the umblicus.        Incision:    Extremities: peripheral pulses normal, no pedal edema, no clubbing or cyanosis     Lab Review  No results found for this or any previous visit (from the past 4 hour(s)).    I/O    Intake/Output Summary (Last 24 hours) at 7/31/2019 0516  Last data filed at 7/30/2019 1650  Gross per 24 hour   Intake 1000 ml   Output 1075 ml   Net -75 ml        Assessment:     Patient Active Problem List   Diagnosis    Anxiety    At risk for depressed mood during postpartum period    Headache    Chiari I malformation    Allergic reaction caused by a drug    Pregnancy with one fetus, antepartum    38 weeks gestation of pregnancy        Plan:   1. Postpartum care:  - Patient doing well. Continue routine management and advances.  - Continue PO pain meds. Pain well controlled.  - Heme: H/h 10/38. Post: 10/32  - Encourage ambulation  - Contraception none as cannot get pregnant  - Lactation breastfeeding.  Consultation PRN    Dispo: As patient meets milestones, will plan to discharge PPD1-2.    Ayanna Miller MD  OBGYN PGY-1

## 2019-07-31 NOTE — DISCHARGE SUMMARY
Delivery Discharge Summary  Obstetrics      Primary OB Clinician: Regina Rodriguez DO      Admission date: 2019  Discharge date: 2019    Disposition: To home, self care    Discharge Diagnosis List:      Patient Active Problem List   Diagnosis    Anxiety    At risk for depressed mood during postpartum period    Headache    Chiari I malformation     (spontaneous vaginal delivery)    Allergic reaction caused by a drug       Procedure:     Hospital Course:  Zandra Sanchez is a 33 y.o. now , PPD #1 who was admitted on 2019 at 38w2d for contractions. Patient was subsequently admitted to labor and delivery unit with signed consents.     Labor course was uncomplicated and resulted in  without complications.     Please see delivery note for further details. Her postpartum course was uncomplicated. On discharge day, patient's pain is controlled with oral pain medications. Pt is tolerating ambulation without SOB or CP, and regular diet without N/V. Reports lochia is mild. Denies any HA, vision changes, F/C, LE swelling. Denies any breast pain/soreness.    Pt in stable condition and ready for discharge. She has been instructed to start and/or continue medications and follow up with her obstetrics provider as listed below.    Pertinent studies:  CBC  Recent Labs   Lab 19  0913 19  0057   WBC 11.27 12.89*   HGB 10.8* 10.6*   HCT 32.2* 32.0*   MCV 81* 83    189        There is no immunization history for the selected administration types on file for this patient.     Delivery:    Episiotomy: None   Lacerations: 1st   Repair suture:     Repair # of packets: 2   Blood loss (ml): 150     Birth information:  YOB: 2021   Time of birth: 1:37 PM   Sex: female   Delivery type: Vaginal, Spontaneous   Gestational Age: 38w2d    Delivery Clinician:      Other providers:       Additional  information:  Forceps:    Vacuum:    Breech:    Observed anomalies      Living?:            APGARS  One minute Five minutes Ten minutes   Skin color:         Heart rate:         Grimace:         Muscle tone:         Breathing:         Totals: 9  9        Placenta: Delivered:       appearance      Patient Instructions:   Current Discharge Medication List      START taking these medications    Details   ibuprofen (ADVIL,MOTRIN) 600 MG tablet Take 1 tablet (600 mg total) by mouth every 6 (six) hours.  Qty: 60 tablet, Refills: 0         CONTINUE these medications which have NOT CHANGED    Details   buPROPion (WELLBUTRIN) 75 MG tablet TAKE 1 TABLET (75 MG TOTAL) BY MOUTH 3 (THREE) TIMES DAILY.  Qty: 90 tablet, Refills: 1      calcium carbonate (TUMS ORAL) Take by mouth.      escitalopram oxalate (LEXAPRO) 10 MG tablet Take 1.5 tablets (15 mg total) by mouth once daily.  Qty: 45 tablet, Refills: 11    Associated Diagnoses: Anxiety during pregnancy      IRON, CARBONYL ORAL Take by mouth.      naltrexone (DEPADE) 50 mg tablet 10 mg once daily.       ondansetron (ZOFRAN) 4 MG tablet Take 1 tablet (4 mg total) by mouth every 6 (six) hours as needed for Nausea.  Qty: 20 tablet, Refills: 0      PNV no.95/ferrous fum/folic ac (PRENATAL ORAL) Take by mouth.             Discharge Procedure Orders   Diet Adult Regular     Other restrictions (specify):   Order Comments: Pelvic rest, nothing in the vagina, for 6 weeks. No baths for 6 weeks, only shower. Don't drive a car if you are on narcotics.     Notify your health care provider if you experience any of the following:  temperature >100.4     Notify your health care provider if you experience any of the following:  persistent nausea and vomiting or diarrhea     Notify your health care provider if you experience any of the following:  severe uncontrolled pain     Notify your health care provider if you experience any of the following:  redness, tenderness, or signs of infection (pain, swelling, redness, odor or green/yellow discharge around incision site)      Notify your health care provider if you experience any of the following:  difficulty breathing or increased cough     Notify your health care provider if you experience any of the following:  severe persistent headache     Notify your health care provider if you experience any of the following:  worsening rash     Notify your health care provider if you experience any of the following:  persistent dizziness, light-headedness, or visual disturbances     Notify your health care provider if you experience any of the following:  increased confusion or weakness     Notify your health care provider if you experience any of the following:   Order Comments: If you have vaginal bleeding greater than 1 pad per hour for 2 hours     Activity as tolerated       Follow-up Information     Regina Rodriguez DO. Schedule an appointment as soon as possible for a visit in 6 weeks.    Specialty:  Obstetrics and Gynecology  Why:  Postpartum Visit  Contact information:  [address] [id number] [phone]                    Ayanna Miller MD  OBGYN PGY-1

## 2019-07-31 NOTE — ANESTHESIA POSTPROCEDURE EVALUATION
Anesthesia Post Evaluation    Patient: Zandra Sanchez    Procedure(s) Performed: * No procedures listed *    Final Anesthesia Type: epidural  Patient location during evaluation: floor  Patient participation: Yes- Able to Participate  Level of consciousness: oriented and awake and alert  Post-procedure vital signs: reviewed and stable  Pain management: adequate  Airway patency: patent  PONV status at discharge: No PONV  Anesthetic complications: no      Cardiovascular status: blood pressure returned to baseline and hemodynamically stable  Respiratory status: unassisted  Hydration status: euvolemic  Follow-up not needed.          Vitals Value Taken Time   /61 7/31/2019  7:58 AM   Temp 36.6 °C (97.8 °F) 7/31/2019  7:58 AM   Pulse 66 7/31/2019  7:58 AM   Resp 16 7/31/2019  7:58 AM   SpO2 95 % 7/31/2019  7:58 AM         No case tracking events are documented in the log.      Pain/Ladonna Score: Pain Rating Prior to Med Admin: 3 (7/31/2019 11:19 AM)  Pain Rating Post Med Admin: 4 (7/31/2019  6:00 AM)

## 2019-07-31 NOTE — NURSING
Reviews42hony pump, tubing, collections containers and labels brought to bedside.  Discussed proper pump setting of initiation phase.  Instructed on proper usage of pump and to adjust suction according to maximum comfort level.  Verified appropriate flange fit.  Educated on the frequency and duration of pumping in order to promote and maintain a full milk supply.  Hands on pumping technique reviewed.  Encouraged hand expression after pumping.  Instructed on cleaning of breast pump parts.  Written instructions also given.  Pt verbalized understanding and appropriate recall for proper milk handling, collection, labeling, storage and transportation.

## 2019-08-15 ENCOUNTER — TELEPHONE (OUTPATIENT)
Dept: OBSTETRICS AND GYNECOLOGY | Facility: OTHER | Age: 34
End: 2019-08-15

## 2019-08-15 RX ORDER — BUPROPION HYDROCHLORIDE 75 MG/1
TABLET ORAL
Qty: 90 TABLET | Refills: 1 | Status: SHIPPED | OUTPATIENT
Start: 2019-08-15 | End: 2019-11-17 | Stop reason: SDUPTHER

## 2019-08-15 NOTE — TELEPHONE ENCOUNTER
Pt doing well. Denies any issues with pain. Encouraged to schedule 6 week PP appt with Dr. Rodriguez at earliest convenience. Infant doing well at home and is being followed by Dr. Johansen. Pt states that breastfeeding is going well and that she has a lactation consultant that comes to her house to assist them. Denies any questions or concerns.

## 2019-08-27 ENCOUNTER — PATIENT MESSAGE (OUTPATIENT)
Dept: OBSTETRICS AND GYNECOLOGY | Facility: CLINIC | Age: 34
End: 2019-08-27

## 2019-08-29 ENCOUNTER — PATIENT MESSAGE (OUTPATIENT)
Dept: OBSTETRICS AND GYNECOLOGY | Facility: CLINIC | Age: 34
End: 2019-08-29

## 2019-09-13 ENCOUNTER — TELEPHONE (OUTPATIENT)
Dept: OBSTETRICS AND GYNECOLOGY | Facility: CLINIC | Age: 34
End: 2019-09-13

## 2019-09-24 ENCOUNTER — TELEPHONE (OUTPATIENT)
Dept: OBSTETRICS AND GYNECOLOGY | Facility: CLINIC | Age: 34
End: 2019-09-24

## 2019-09-24 NOTE — TELEPHONE ENCOUNTER
Spoke with pt about her experience during delivery and the resident interactions.  We discussed different strategies to help improve communication throughout the teams.  All issues were addressed and pt was appreciative of the call.

## 2019-10-15 ENCOUNTER — PATIENT MESSAGE (OUTPATIENT)
Dept: OBSTETRICS AND GYNECOLOGY | Facility: CLINIC | Age: 34
End: 2019-10-15

## 2019-10-22 ENCOUNTER — OFFICE VISIT (OUTPATIENT)
Dept: OBSTETRICS AND GYNECOLOGY | Facility: CLINIC | Age: 34
End: 2019-10-22
Attending: OBSTETRICS & GYNECOLOGY
Payer: COMMERCIAL

## 2019-10-22 VITALS
HEIGHT: 68 IN | SYSTOLIC BLOOD PRESSURE: 110 MMHG | BODY MASS INDEX: 21.05 KG/M2 | WEIGHT: 138.88 LBS | DIASTOLIC BLOOD PRESSURE: 72 MMHG

## 2019-10-22 DIAGNOSIS — Z01.419 WELL WOMAN EXAM WITH ROUTINE GYNECOLOGICAL EXAM: Primary | ICD-10-CM

## 2019-10-22 PROCEDURE — 99395 PREV VISIT EST AGE 18-39: CPT | Mod: S$GLB,,, | Performed by: OBSTETRICS & GYNECOLOGY

## 2019-10-22 PROCEDURE — 99395 PR PREVENTIVE VISIT,EST,18-39: ICD-10-PCS | Mod: S$GLB,,, | Performed by: OBSTETRICS & GYNECOLOGY

## 2019-10-22 RX ORDER — LORATADINE 10 MG/1
10 TABLET ORAL DAILY
COMMUNITY
End: 2021-02-12

## 2019-10-22 NOTE — PROGRESS NOTES
CC: Well woman exam    Zandra Sanchez is a 34 y.o. female  presents for well woman exam.  LMP: No LMP recorded..  No issues, problems, or complaints.  Due for pap this year. Currently breastfeeding, doing well.  Declines contraception at this time, reports increased allergies unsure to what discussed referral to allergist.    Past Medical History:   Diagnosis Date    Depression 2015    Lexapro and Wellbrutrin for about 8months.    Infertility     Migraine headache     for 4 months.before pregnancy at beginning of cyles    Neurological abnormality     States she was diagnosed with Chiari malformation when being evaluated for migraines. Was told it is benign.     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   Social Needs    Financial resource strain: Not on file    Food insecurity:     Worry: Not on file     Inability: Not on file    Transportation needs:     Medical: Not on file     Non-medical: Not on file   Tobacco Use    Smoking status: Never Smoker    Smokeless tobacco: Never Used   Substance and Sexual Activity    Alcohol use: No    Drug use: No    Sexual activity: Yes     Partners: Male     Birth control/protection: None     Comment:    Lifestyle    Physical activity:     Days per week: Not on file     Minutes per session: Not on file    Stress: Not on file   Relationships    Social connections:     Talks on phone: Not on file     Gets together: Not on file     Attends Anabaptism service: Not on file     Active member of club or organization: Not  "on file     Attends meetings of clubs or organizations: Not on file     Relationship status: Not on file   Other Topics Concern    Not on file   Social History Narrative    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 Yes   BMI 21.12 kg/m²       ROS:  GENERAL: Denies weight gain or weight loss. Feeling well overall.   SKIN: Denies rash or lesions.   HEAD: Denies head injury or headache.   NODES: Denies enlarged lymph nodes.   CHEST: Denies chest pain or shortness of breath.   CARDIOVASCULAR: Denies palpitations or left sided chest pain.   ABDOMEN: No abdominal pain, constipation, diarrhea, nausea, vomiting or rectal bleeding.   URINARY: No frequency, dysuria, hematuria, or burning on urination.  REPRODUCTIVE: See HPI.   BREASTS: The patient performs breast self-examination and denies pain, lumps, or nipple discharge.   HEMATOLOGIC: No easy bruisability or excessive bleeding.   MUSCULOSKELETAL: Denies joint pain or swelling.   NEUROLOGIC: Denies syncope or weakness.   PSYCHIATRIC: Denies depression, anxiety or mood swings.    PHYSICAL EXAM:  APPEARANCE: Well nourished, well developed, in no acute distress.  AFFECT: WNL, alert and oriented x 3  SKIN: No acne or hirsutism  NECK: Neck symmetric without masses or thyromegaly  NODES: No inguinal, cervical, axillary, or femoral lymph node " enlargement  CHEST: Good respiratory effect  ABDOMEN: Soft.  No tenderness or masses.  No hepatosplenomegaly.  No hernias.  BREASTS: Symmetrical, no skin changes or visible lesions.  No palpable masses, nipple discharge bilaterally.  PELVIC: Normal external genitalia without lesions.  Normal hair distribution.  Adequate perineal body, normal urethral meatus.  Vagina moist and well rugated without lesions or discharge.  Cervix pink, without lesions, discharge or tenderness.  No significant cystocele or rectocele.  Bimanual exam shows uterus to be normal size, regular, mobile and nontender.  Adnexa without masses or tenderness.    EXTREMITIES: No edema.    Well woman exam with routine gynecological exam            Patient was counseled today on A.C.S. Pap guidelines and recommendations for yearly pelvic exams, mammograms and monthly self breast exams; to see her PCP for other health maintenance.     No follow-ups on file.

## 2019-11-18 RX ORDER — BUPROPION HYDROCHLORIDE 75 MG/1
TABLET ORAL
Qty: 90 TABLET | Refills: 1 | Status: SHIPPED | OUTPATIENT
Start: 2019-11-18 | End: 2019-12-10 | Stop reason: SDUPTHER

## 2019-12-10 RX ORDER — BUPROPION HYDROCHLORIDE 75 MG/1
TABLET ORAL
Qty: 90 TABLET | Refills: 0 | Status: SHIPPED | OUTPATIENT
Start: 2019-12-10 | End: 2020-01-09

## 2020-01-09 RX ORDER — BUPROPION HYDROCHLORIDE 75 MG/1
TABLET ORAL
Qty: 90 TABLET | Refills: 0 | Status: SHIPPED | OUTPATIENT
Start: 2020-01-09 | End: 2020-03-24

## 2020-03-03 ENCOUNTER — PATIENT MESSAGE (OUTPATIENT)
Dept: OBSTETRICS AND GYNECOLOGY | Facility: CLINIC | Age: 35
End: 2020-03-03

## 2020-03-09 DIAGNOSIS — F41.9 ANXIETY DURING PREGNANCY: ICD-10-CM

## 2020-03-09 DIAGNOSIS — O99.340 ANXIETY DURING PREGNANCY: ICD-10-CM

## 2020-03-09 RX ORDER — ESCITALOPRAM OXALATE 10 MG/1
TABLET ORAL
Qty: 45 TABLET | Refills: 11 | Status: SHIPPED | OUTPATIENT
Start: 2020-03-09 | End: 2021-02-12 | Stop reason: SDUPTHER

## 2020-03-22 ENCOUNTER — PATIENT MESSAGE (OUTPATIENT)
Dept: OBSTETRICS AND GYNECOLOGY | Facility: CLINIC | Age: 35
End: 2020-03-22

## 2020-03-23 DIAGNOSIS — F32.A DEPRESSION, UNSPECIFIED DEPRESSION TYPE: Primary | ICD-10-CM

## 2020-03-23 RX ORDER — BUPROPION HYDROCHLORIDE 75 MG/1
TABLET ORAL
Qty: 90 TABLET | Refills: 0 | Status: CANCELLED | OUTPATIENT
Start: 2020-03-23

## 2020-03-24 RX ORDER — BUPROPION HYDROCHLORIDE 75 MG/1
75 TABLET ORAL 2 TIMES DAILY
Qty: 180 TABLET | Refills: 3 | Status: SHIPPED | OUTPATIENT
Start: 2020-03-24 | End: 2021-06-08

## 2021-01-25 ENCOUNTER — TELEPHONE (OUTPATIENT)
Dept: OBSTETRICS AND GYNECOLOGY | Facility: CLINIC | Age: 36
End: 2021-01-25

## 2021-01-25 DIAGNOSIS — Z36.3 ENCOUNTER FOR ANTENATAL SCREENING FOR MALFORMATION USING ULTRASOUND: Primary | ICD-10-CM

## 2021-02-10 ENCOUNTER — PATIENT MESSAGE (OUTPATIENT)
Dept: OBSTETRICS AND GYNECOLOGY | Facility: CLINIC | Age: 36
End: 2021-02-10

## 2021-02-10 ENCOUNTER — TELEPHONE (OUTPATIENT)
Dept: OBSTETRICS AND GYNECOLOGY | Facility: CLINIC | Age: 36
End: 2021-02-10

## 2021-02-11 ENCOUNTER — PATIENT MESSAGE (OUTPATIENT)
Dept: OBSTETRICS AND GYNECOLOGY | Facility: CLINIC | Age: 36
End: 2021-02-11

## 2021-02-12 ENCOUNTER — OFFICE VISIT (OUTPATIENT)
Dept: OBSTETRICS AND GYNECOLOGY | Facility: CLINIC | Age: 36
End: 2021-02-12
Attending: OBSTETRICS & GYNECOLOGY
Payer: COMMERCIAL

## 2021-02-12 VITALS
BODY MASS INDEX: 19.88 KG/M2 | DIASTOLIC BLOOD PRESSURE: 62 MMHG | HEIGHT: 68 IN | WEIGHT: 131.19 LBS | SYSTOLIC BLOOD PRESSURE: 100 MMHG

## 2021-02-12 DIAGNOSIS — N91.2 AMENORRHEA: Primary | ICD-10-CM

## 2021-02-12 DIAGNOSIS — F41.9 ANXIETY DURING PREGNANCY: ICD-10-CM

## 2021-02-12 DIAGNOSIS — O99.340 ANXIETY DURING PREGNANCY: ICD-10-CM

## 2021-02-12 PROCEDURE — 99214 PR OFFICE/OUTPT VISIT, EST, LEVL IV, 30-39 MIN: ICD-10-PCS | Mod: S$GLB,,, | Performed by: OBSTETRICS & GYNECOLOGY

## 2021-02-12 PROCEDURE — 3008F PR BODY MASS INDEX (BMI) DOCUMENTED: ICD-10-PCS | Mod: CPTII,S$GLB,, | Performed by: OBSTETRICS & GYNECOLOGY

## 2021-02-12 PROCEDURE — 3008F BODY MASS INDEX DOCD: CPT | Mod: CPTII,S$GLB,, | Performed by: OBSTETRICS & GYNECOLOGY

## 2021-02-12 PROCEDURE — 87086 URINE CULTURE/COLONY COUNT: CPT

## 2021-02-12 PROCEDURE — 99999 PR PBB SHADOW E&M-EST. PATIENT-LVL III: ICD-10-PCS | Mod: PBBFAC,,, | Performed by: OBSTETRICS & GYNECOLOGY

## 2021-02-12 PROCEDURE — 99214 OFFICE O/P EST MOD 30 MIN: CPT | Mod: S$GLB,,, | Performed by: OBSTETRICS & GYNECOLOGY

## 2021-02-12 PROCEDURE — 99999 PR PBB SHADOW E&M-EST. PATIENT-LVL III: CPT | Mod: PBBFAC,,, | Performed by: OBSTETRICS & GYNECOLOGY

## 2021-02-12 RX ORDER — AZELAIC ACID 0.15 G/G
AEROSOL, FOAM TOPICAL
COMMUNITY
Start: 2021-01-28 | End: 2022-05-09

## 2021-02-12 RX ORDER — ONDANSETRON 4 MG/1
4 TABLET, ORALLY DISINTEGRATING ORAL EVERY 6 HOURS PRN
Qty: 30 TABLET | Refills: 1 | Status: SHIPPED | OUTPATIENT
Start: 2021-02-12 | End: 2021-06-07

## 2021-02-12 RX ORDER — MAGNESIUM 200 MG
TABLET ORAL ONCE
COMMUNITY

## 2021-02-12 RX ORDER — VALACYCLOVIR HYDROCHLORIDE 1 G/1
1000 TABLET, FILM COATED ORAL 2 TIMES DAILY
COMMUNITY
Start: 2020-11-27 | End: 2021-02-12

## 2021-02-12 RX ORDER — IVERMECTIN 10 MG/G
CREAM TOPICAL
COMMUNITY
Start: 2020-12-30 | End: 2022-05-09

## 2021-02-12 RX ORDER — DOXYCYCLINE HYCLATE 100 MG
100 TABLET ORAL 2 TIMES DAILY
COMMUNITY
Start: 2020-11-27 | End: 2021-02-12

## 2021-02-12 RX ORDER — ESCITALOPRAM OXALATE 10 MG/1
15 TABLET ORAL DAILY
Qty: 45 TABLET | Refills: 11 | Status: SHIPPED | OUTPATIENT
Start: 2021-02-12 | End: 2021-02-15 | Stop reason: SDUPTHER

## 2021-02-14 LAB — BACTERIA UR CULT: NO GROWTH

## 2021-02-15 DIAGNOSIS — F41.9 ANXIETY DURING PREGNANCY: ICD-10-CM

## 2021-02-15 DIAGNOSIS — O99.340 ANXIETY DURING PREGNANCY: ICD-10-CM

## 2021-02-15 RX ORDER — ESCITALOPRAM OXALATE 10 MG/1
15 TABLET ORAL DAILY
Qty: 45 TABLET | Refills: 3 | Status: SHIPPED | OUTPATIENT
Start: 2021-02-15 | End: 2021-03-24 | Stop reason: SDUPTHER

## 2021-02-23 ENCOUNTER — PROCEDURE VISIT (OUTPATIENT)
Dept: OBSTETRICS AND GYNECOLOGY | Facility: CLINIC | Age: 36
End: 2021-02-23
Attending: OBSTETRICS & GYNECOLOGY
Payer: COMMERCIAL

## 2021-02-23 ENCOUNTER — PATIENT MESSAGE (OUTPATIENT)
Dept: OBSTETRICS AND GYNECOLOGY | Facility: CLINIC | Age: 36
End: 2021-02-23

## 2021-02-23 DIAGNOSIS — Z36.3 ENCOUNTER FOR ANTENATAL SCREENING FOR MALFORMATION USING ULTRASOUND: ICD-10-CM

## 2021-02-23 DIAGNOSIS — Z36.89 ESTABLISH GESTATIONAL AGE, ULTRASOUND: ICD-10-CM

## 2021-02-23 PROCEDURE — 76801 PR US, OB <14WKS, TRANSABD, SINGLE GESTATION: ICD-10-PCS | Mod: S$GLB,,, | Performed by: OBSTETRICS & GYNECOLOGY

## 2021-02-23 PROCEDURE — 76801 OB US < 14 WKS SINGLE FETUS: CPT | Mod: S$GLB,,, | Performed by: OBSTETRICS & GYNECOLOGY

## 2021-03-03 ENCOUNTER — PATIENT MESSAGE (OUTPATIENT)
Dept: OBSTETRICS AND GYNECOLOGY | Facility: CLINIC | Age: 36
End: 2021-03-03

## 2021-03-04 ENCOUNTER — PATIENT MESSAGE (OUTPATIENT)
Dept: OBSTETRICS AND GYNECOLOGY | Facility: CLINIC | Age: 36
End: 2021-03-04

## 2021-03-04 RX ORDER — DOXYLAMINE SUCCINATE AND PYRIDOXINE HYDROCHLORIDE, DELAYED RELEASE TABLETS 10 MG/10 MG 10; 10 MG/1; MG/1
2 TABLET, DELAYED RELEASE ORAL NIGHTLY
Qty: 60 TABLET | Refills: 3 | Status: SHIPPED | OUTPATIENT
Start: 2021-03-04 | End: 2021-07-30

## 2021-03-17 ENCOUNTER — TELEPHONE (OUTPATIENT)
Dept: OBSTETRICS AND GYNECOLOGY | Facility: CLINIC | Age: 36
End: 2021-03-17

## 2021-03-19 ENCOUNTER — TELEPHONE (OUTPATIENT)
Dept: OBSTETRICS AND GYNECOLOGY | Facility: CLINIC | Age: 36
End: 2021-03-19

## 2021-03-24 ENCOUNTER — INITIAL PRENATAL (OUTPATIENT)
Dept: OBSTETRICS AND GYNECOLOGY | Facility: CLINIC | Age: 36
End: 2021-03-24
Attending: OBSTETRICS & GYNECOLOGY
Payer: COMMERCIAL

## 2021-03-24 VITALS
WEIGHT: 136.69 LBS | SYSTOLIC BLOOD PRESSURE: 100 MMHG | BODY MASS INDEX: 20.78 KG/M2 | DIASTOLIC BLOOD PRESSURE: 60 MMHG

## 2021-03-24 DIAGNOSIS — O99.340 ANXIETY DURING PREGNANCY: ICD-10-CM

## 2021-03-24 DIAGNOSIS — F41.9 ANXIETY DURING PREGNANCY: ICD-10-CM

## 2021-03-24 DIAGNOSIS — F41.9 ANXIETY: ICD-10-CM

## 2021-03-24 DIAGNOSIS — Z34.90 PREGNANCY WITH ONE FETUS, ANTEPARTUM: ICD-10-CM

## 2021-03-24 DIAGNOSIS — R11.0 NAUSEA: Primary | ICD-10-CM

## 2021-03-24 PROCEDURE — 0502F SUBSEQUENT PRENATAL CARE: CPT | Mod: CPTII,S$GLB,, | Performed by: OBSTETRICS & GYNECOLOGY

## 2021-03-24 PROCEDURE — 99999 PR PBB SHADOW E&M-EST. PATIENT-LVL III: CPT | Mod: PBBFAC,,, | Performed by: OBSTETRICS & GYNECOLOGY

## 2021-03-24 PROCEDURE — 99999 PR PBB SHADOW E&M-EST. PATIENT-LVL III: ICD-10-PCS | Mod: PBBFAC,,, | Performed by: OBSTETRICS & GYNECOLOGY

## 2021-03-24 PROCEDURE — 0502F PR SUBSEQUENT PRENATAL CARE: ICD-10-PCS | Mod: CPTII,S$GLB,, | Performed by: OBSTETRICS & GYNECOLOGY

## 2021-03-24 RX ORDER — ESCITALOPRAM OXALATE 10 MG/1
15 TABLET ORAL DAILY
Qty: 45 TABLET | Refills: 3 | Status: SHIPPED | OUTPATIENT
Start: 2021-03-24 | End: 2021-07-01

## 2021-03-24 RX ORDER — ASPIRIN 81 MG/1
81 TABLET ORAL DAILY
COMMUNITY
End: 2022-05-09

## 2021-03-24 RX ORDER — FLUTICASONE PROPIONATE 50 MCG
1 SPRAY, SUSPENSION (ML) NASAL DAILY
Qty: 15.8 ML | Refills: 0 | Status: SHIPPED | OUTPATIENT
Start: 2021-03-24 | End: 2021-04-12

## 2021-03-25 ENCOUNTER — PATIENT MESSAGE (OUTPATIENT)
Dept: MATERNAL FETAL MEDICINE | Facility: CLINIC | Age: 36
End: 2021-03-25

## 2021-03-27 ENCOUNTER — PATIENT MESSAGE (OUTPATIENT)
Dept: ADMINISTRATIVE | Facility: OTHER | Age: 36
End: 2021-03-27

## 2021-03-30 ENCOUNTER — PATIENT MESSAGE (OUTPATIENT)
Dept: ADMINISTRATIVE | Facility: OTHER | Age: 36
End: 2021-03-30

## 2021-04-06 ENCOUNTER — PATIENT MESSAGE (OUTPATIENT)
Dept: OBSTETRICS AND GYNECOLOGY | Facility: CLINIC | Age: 36
End: 2021-04-06

## 2021-04-16 ENCOUNTER — PATIENT MESSAGE (OUTPATIENT)
Dept: RESEARCH | Facility: HOSPITAL | Age: 36
End: 2021-04-16

## 2021-04-22 ENCOUNTER — TELEPHONE (OUTPATIENT)
Dept: OBSTETRICS AND GYNECOLOGY | Facility: CLINIC | Age: 36
End: 2021-04-22

## 2021-04-23 ENCOUNTER — PATIENT MESSAGE (OUTPATIENT)
Dept: OBSTETRICS AND GYNECOLOGY | Facility: CLINIC | Age: 36
End: 2021-04-23

## 2021-04-24 ENCOUNTER — PATIENT MESSAGE (OUTPATIENT)
Dept: OBSTETRICS AND GYNECOLOGY | Facility: CLINIC | Age: 36
End: 2021-04-24

## 2021-04-28 RX ORDER — BUTALBITAL, ACETAMINOPHEN AND CAFFEINE 50; 325; 40 MG/1; MG/1; MG/1
1 TABLET ORAL EVERY 4 HOURS PRN
Qty: 30 TABLET | Refills: 0 | Status: SHIPPED | OUTPATIENT
Start: 2021-04-28 | End: 2021-05-28

## 2021-05-10 ENCOUNTER — ROUTINE PRENATAL (OUTPATIENT)
Dept: OBSTETRICS AND GYNECOLOGY | Facility: CLINIC | Age: 36
End: 2021-05-10
Attending: OBSTETRICS & GYNECOLOGY
Payer: COMMERCIAL

## 2021-05-10 ENCOUNTER — LAB VISIT (OUTPATIENT)
Dept: LAB | Facility: OTHER | Age: 36
End: 2021-05-10
Attending: OBSTETRICS & GYNECOLOGY
Payer: COMMERCIAL

## 2021-05-10 VITALS
WEIGHT: 143.06 LBS | BODY MASS INDEX: 21.76 KG/M2 | DIASTOLIC BLOOD PRESSURE: 62 MMHG | SYSTOLIC BLOOD PRESSURE: 104 MMHG

## 2021-05-10 DIAGNOSIS — Z34.90 PREGNANCY WITH ONE FETUS, ANTEPARTUM: ICD-10-CM

## 2021-05-10 DIAGNOSIS — L29.9 ITCHING: ICD-10-CM

## 2021-05-10 DIAGNOSIS — N91.2 AMENORRHEA: ICD-10-CM

## 2021-05-10 DIAGNOSIS — N89.8 VAGINAL ITCHING: Primary | ICD-10-CM

## 2021-05-10 LAB
ALBUMIN SERPL BCP-MCNC: 3.6 G/DL (ref 3.5–5.2)
ALP SERPL-CCNC: 35 U/L (ref 55–135)
ALT SERPL W/O P-5'-P-CCNC: 13 U/L (ref 10–44)
ANION GAP SERPL CALC-SCNC: 8 MMOL/L (ref 8–16)
AST SERPL-CCNC: 9 U/L (ref 10–40)
BASOPHILS # BLD AUTO: 0.04 K/UL (ref 0–0.2)
BASOPHILS # BLD AUTO: 0.04 K/UL (ref 0–0.2)
BASOPHILS NFR BLD: 0.4 % (ref 0–1.9)
BASOPHILS NFR BLD: 0.4 % (ref 0–1.9)
BILIRUB SERPL-MCNC: 0.2 MG/DL (ref 0.1–1)
BUN SERPL-MCNC: 8 MG/DL (ref 6–20)
CALCIUM SERPL-MCNC: 9.4 MG/DL (ref 8.7–10.5)
CHLORIDE SERPL-SCNC: 103 MMOL/L (ref 95–110)
CO2 SERPL-SCNC: 24 MMOL/L (ref 23–29)
CREAT SERPL-MCNC: 0.6 MG/DL (ref 0.5–1.4)
DIFFERENTIAL METHOD: ABNORMAL
DIFFERENTIAL METHOD: ABNORMAL
EOSINOPHIL # BLD AUTO: 0.1 K/UL (ref 0–0.5)
EOSINOPHIL # BLD AUTO: 0.1 K/UL (ref 0–0.5)
EOSINOPHIL NFR BLD: 1.5 % (ref 0–8)
EOSINOPHIL NFR BLD: 1.5 % (ref 0–8)
ERYTHROCYTE [DISTWIDTH] IN BLOOD BY AUTOMATED COUNT: 12.8 % (ref 11.5–14.5)
ERYTHROCYTE [DISTWIDTH] IN BLOOD BY AUTOMATED COUNT: 12.8 % (ref 11.5–14.5)
EST. GFR  (AFRICAN AMERICAN): >60 ML/MIN/1.73 M^2
EST. GFR  (NON AFRICAN AMERICAN): >60 ML/MIN/1.73 M^2
GLUCOSE SERPL-MCNC: 78 MG/DL (ref 70–110)
HCT VFR BLD AUTO: 37.3 % (ref 37–48.5)
HCT VFR BLD AUTO: 37.3 % (ref 37–48.5)
HGB BLD-MCNC: 12.5 G/DL (ref 12–16)
HGB BLD-MCNC: 12.5 G/DL (ref 12–16)
IMM GRANULOCYTES # BLD AUTO: 0.05 K/UL (ref 0–0.04)
IMM GRANULOCYTES # BLD AUTO: 0.05 K/UL (ref 0–0.04)
IMM GRANULOCYTES NFR BLD AUTO: 0.5 % (ref 0–0.5)
IMM GRANULOCYTES NFR BLD AUTO: 0.5 % (ref 0–0.5)
LYMPHOCYTES # BLD AUTO: 1.9 K/UL (ref 1–4.8)
LYMPHOCYTES # BLD AUTO: 1.9 K/UL (ref 1–4.8)
LYMPHOCYTES NFR BLD: 20.1 % (ref 18–48)
LYMPHOCYTES NFR BLD: 20.1 % (ref 18–48)
MCH RBC QN AUTO: 30.3 PG (ref 27–31)
MCH RBC QN AUTO: 30.3 PG (ref 27–31)
MCHC RBC AUTO-ENTMCNC: 33.5 G/DL (ref 32–36)
MCHC RBC AUTO-ENTMCNC: 33.5 G/DL (ref 32–36)
MCV RBC AUTO: 91 FL (ref 82–98)
MCV RBC AUTO: 91 FL (ref 82–98)
MONOCYTES # BLD AUTO: 0.5 K/UL (ref 0.3–1)
MONOCYTES # BLD AUTO: 0.5 K/UL (ref 0.3–1)
MONOCYTES NFR BLD: 4.8 % (ref 4–15)
MONOCYTES NFR BLD: 4.8 % (ref 4–15)
NEUTROPHILS # BLD AUTO: 7 K/UL (ref 1.8–7.7)
NEUTROPHILS # BLD AUTO: 7 K/UL (ref 1.8–7.7)
NEUTROPHILS NFR BLD: 72.7 % (ref 38–73)
NEUTROPHILS NFR BLD: 72.7 % (ref 38–73)
NRBC BLD-RTO: 0 /100 WBC
NRBC BLD-RTO: 0 /100 WBC
PLATELET # BLD AUTO: 230 K/UL (ref 150–450)
PLATELET # BLD AUTO: 230 K/UL (ref 150–450)
PMV BLD AUTO: 9.8 FL (ref 9.2–12.9)
PMV BLD AUTO: 9.8 FL (ref 9.2–12.9)
POTASSIUM SERPL-SCNC: 4.6 MMOL/L (ref 3.5–5.1)
PROT SERPL-MCNC: 7 G/DL (ref 6–8.4)
RBC # BLD AUTO: 4.12 M/UL (ref 4–5.4)
RBC # BLD AUTO: 4.12 M/UL (ref 4–5.4)
SODIUM SERPL-SCNC: 135 MMOL/L (ref 136–145)
WBC # BLD AUTO: 9.6 K/UL (ref 3.9–12.7)
WBC # BLD AUTO: 9.6 K/UL (ref 3.9–12.7)

## 2021-05-10 PROCEDURE — 86703 HIV-1/HIV-2 1 RESULT ANTBDY: CPT | Performed by: OBSTETRICS & GYNECOLOGY

## 2021-05-10 PROCEDURE — 82306 VITAMIN D 25 HYDROXY: CPT | Performed by: OBSTETRICS & GYNECOLOGY

## 2021-05-10 PROCEDURE — 0502F SUBSEQUENT PRENATAL CARE: CPT | Mod: CPTII,S$GLB,, | Performed by: OBSTETRICS & GYNECOLOGY

## 2021-05-10 PROCEDURE — 82728 ASSAY OF FERRITIN: CPT | Performed by: OBSTETRICS & GYNECOLOGY

## 2021-05-10 PROCEDURE — 86762 RUBELLA ANTIBODY: CPT | Performed by: OBSTETRICS & GYNECOLOGY

## 2021-05-10 PROCEDURE — 36415 COLL VENOUS BLD VENIPUNCTURE: CPT | Performed by: OBSTETRICS & GYNECOLOGY

## 2021-05-10 PROCEDURE — 99999 PR PBB SHADOW E&M-EST. PATIENT-LVL III: CPT | Mod: PBBFAC,,, | Performed by: OBSTETRICS & GYNECOLOGY

## 2021-05-10 PROCEDURE — 87340 HEPATITIS B SURFACE AG IA: CPT | Performed by: OBSTETRICS & GYNECOLOGY

## 2021-05-10 PROCEDURE — 0502F PR SUBSEQUENT PRENATAL CARE: ICD-10-PCS | Mod: CPTII,S$GLB,, | Performed by: OBSTETRICS & GYNECOLOGY

## 2021-05-10 PROCEDURE — 86592 SYPHILIS TEST NON-TREP QUAL: CPT | Performed by: OBSTETRICS & GYNECOLOGY

## 2021-05-10 PROCEDURE — 85025 COMPLETE CBC W/AUTO DIFF WBC: CPT | Performed by: OBSTETRICS & GYNECOLOGY

## 2021-05-10 PROCEDURE — 82239 BILE ACIDS TOTAL: CPT | Performed by: OBSTETRICS & GYNECOLOGY

## 2021-05-10 PROCEDURE — 99999 PR PBB SHADOW E&M-EST. PATIENT-LVL III: ICD-10-PCS | Mod: PBBFAC,,, | Performed by: OBSTETRICS & GYNECOLOGY

## 2021-05-10 PROCEDURE — 80053 COMPREHEN METABOLIC PANEL: CPT | Performed by: OBSTETRICS & GYNECOLOGY

## 2021-05-10 PROCEDURE — 83540 ASSAY OF IRON: CPT | Performed by: OBSTETRICS & GYNECOLOGY

## 2021-05-11 ENCOUNTER — PROCEDURE VISIT (OUTPATIENT)
Dept: MATERNAL FETAL MEDICINE | Facility: CLINIC | Age: 36
End: 2021-05-11
Attending: OBSTETRICS & GYNECOLOGY
Payer: COMMERCIAL

## 2021-05-11 ENCOUNTER — PATIENT MESSAGE (OUTPATIENT)
Dept: MATERNAL FETAL MEDICINE | Facility: CLINIC | Age: 36
End: 2021-05-11

## 2021-05-11 DIAGNOSIS — O09.522 MULTIGRAVIDA OF ADVANCED MATERNAL AGE IN SECOND TRIMESTER: ICD-10-CM

## 2021-05-11 DIAGNOSIS — Z36.89 ENCOUNTER FOR ULTRASOUND TO CHECK FETAL GROWTH: Primary | ICD-10-CM

## 2021-05-11 DIAGNOSIS — Z36.89 ENCOUNTER FOR FETAL ANATOMIC SURVEY: ICD-10-CM

## 2021-05-11 DIAGNOSIS — Z34.90 PREGNANCY WITH ONE FETUS, ANTEPARTUM: ICD-10-CM

## 2021-05-11 LAB
25(OH)D3+25(OH)D2 SERPL-MCNC: 31 NG/ML (ref 30–96)
FERRITIN SERPL-MCNC: 25 NG/ML (ref 20–300)
HBV SURFACE AG SERPL QL IA: NEGATIVE
HIV 1+2 AB+HIV1 P24 AG SERPL QL IA: NEGATIVE
IRON SERPL-MCNC: 82 UG/DL (ref 30–160)
RPR SER QL: NORMAL
RUBV IGG SER-ACNC: 45.6 IU/ML
RUBV IGG SER-IMP: REACTIVE
SATURATED IRON: 17 % (ref 20–50)
TOTAL IRON BINDING CAPACITY: 491 UG/DL (ref 250–450)
TRANSFERRIN SERPL-MCNC: 332 MG/DL (ref 200–375)

## 2021-05-11 PROCEDURE — 76811 OB US DETAILED SNGL FETUS: CPT | Mod: S$GLB,,, | Performed by: OBSTETRICS & GYNECOLOGY

## 2021-05-11 PROCEDURE — 76811 PR US, OB FETAL EVAL & EXAM, TRANSABDOM,FIRST GESTATION: ICD-10-PCS | Mod: S$GLB,,, | Performed by: OBSTETRICS & GYNECOLOGY

## 2021-05-12 LAB — BILE AC SERPL-SCNC: 4 MCMOL/L

## 2021-05-17 ENCOUNTER — HOSPITAL ENCOUNTER (EMERGENCY)
Facility: OTHER | Age: 36
Discharge: HOME | End: 2021-05-17
Payer: COMMERCIAL

## 2021-05-17 ENCOUNTER — PATIENT MESSAGE (OUTPATIENT)
Dept: OBSTETRICS AND GYNECOLOGY | Facility: CLINIC | Age: 36
End: 2021-05-17

## 2021-05-17 ENCOUNTER — NURSE TRIAGE (OUTPATIENT)
Dept: ADMINISTRATIVE | Facility: CLINIC | Age: 36
End: 2021-05-17

## 2021-05-17 VITALS
RESPIRATION RATE: 18 BRPM | TEMPERATURE: 98 F | DIASTOLIC BLOOD PRESSURE: 57 MMHG | HEART RATE: 75 BPM | OXYGEN SATURATION: 100 % | SYSTOLIC BLOOD PRESSURE: 105 MMHG

## 2021-05-17 DIAGNOSIS — N93.9 VAGINAL BLEEDING: Primary | ICD-10-CM

## 2021-05-17 DIAGNOSIS — N84.1 CERVICAL POLYP: ICD-10-CM

## 2021-05-17 DIAGNOSIS — Z3A.19 19 WEEKS GESTATION OF PREGNANCY: ICD-10-CM

## 2021-05-17 PROCEDURE — 99281 EMR DPT VST MAYX REQ PHY/QHP: CPT

## 2021-05-17 PROCEDURE — 99283 EMERGENCY DEPT VISIT LOW MDM: CPT | Mod: ,,, | Performed by: OBSTETRICS & GYNECOLOGY

## 2021-05-17 PROCEDURE — 99283 PR EMERGENCY DEPT VISIT,LEVEL III: ICD-10-PCS | Mod: ,,, | Performed by: OBSTETRICS & GYNECOLOGY

## 2021-06-06 ENCOUNTER — HOSPITAL ENCOUNTER (EMERGENCY)
Facility: OTHER | Age: 36
Discharge: HOME | End: 2021-06-06
Attending: OBSTETRICS & GYNECOLOGY
Payer: COMMERCIAL

## 2021-06-06 VITALS
SYSTOLIC BLOOD PRESSURE: 109 MMHG | HEART RATE: 87 BPM | OXYGEN SATURATION: 99 % | DIASTOLIC BLOOD PRESSURE: 69 MMHG | RESPIRATION RATE: 16 BRPM | TEMPERATURE: 99 F

## 2021-06-06 DIAGNOSIS — N84.1 CERVICAL POLYP: Primary | ICD-10-CM

## 2021-06-06 DIAGNOSIS — Z3A.22 22 WEEKS GESTATION OF PREGNANCY: ICD-10-CM

## 2021-06-06 PROCEDURE — 99283 EMERGENCY DEPT VISIT LOW MDM: CPT | Mod: ,,, | Performed by: OBSTETRICS & GYNECOLOGY

## 2021-06-06 PROCEDURE — 99281 EMR DPT VST MAYX REQ PHY/QHP: CPT

## 2021-06-06 PROCEDURE — 99283 PR EMERGENCY DEPT VISIT,LEVEL III: ICD-10-PCS | Mod: ,,, | Performed by: OBSTETRICS & GYNECOLOGY

## 2021-06-07 ENCOUNTER — PROCEDURE VISIT (OUTPATIENT)
Dept: MATERNAL FETAL MEDICINE | Facility: CLINIC | Age: 36
End: 2021-06-07
Attending: OBSTETRICS & GYNECOLOGY
Payer: COMMERCIAL

## 2021-06-07 DIAGNOSIS — Z36.89 ENCOUNTER FOR ULTRASOUND TO CHECK FETAL GROWTH: ICD-10-CM

## 2021-06-07 DIAGNOSIS — Z36.9 ENCOUNTER FOR FETAL ULTRASOUND: Primary | ICD-10-CM

## 2021-06-07 PROCEDURE — 76816 PR  US,PREGNANT UTERUS,F/U,TRANSABD APP: ICD-10-PCS | Mod: S$GLB,,, | Performed by: OBSTETRICS & GYNECOLOGY

## 2021-06-07 PROCEDURE — 76816 OB US FOLLOW-UP PER FETUS: CPT | Mod: S$GLB,,, | Performed by: OBSTETRICS & GYNECOLOGY

## 2021-06-08 ENCOUNTER — ROUTINE PRENATAL (OUTPATIENT)
Dept: OBSTETRICS AND GYNECOLOGY | Facility: CLINIC | Age: 36
End: 2021-06-08
Attending: OBSTETRICS & GYNECOLOGY
Payer: COMMERCIAL

## 2021-06-08 VITALS
SYSTOLIC BLOOD PRESSURE: 100 MMHG | BODY MASS INDEX: 22.59 KG/M2 | DIASTOLIC BLOOD PRESSURE: 60 MMHG | WEIGHT: 148.56 LBS

## 2021-06-08 DIAGNOSIS — Z34.90 PREGNANCY WITH ONE FETUS, ANTEPARTUM: Primary | ICD-10-CM

## 2021-06-08 DIAGNOSIS — N84.1 CERVICAL POLYP: ICD-10-CM

## 2021-06-08 PROCEDURE — 99999 PR PBB SHADOW E&M-EST. PATIENT-LVL III: ICD-10-PCS | Mod: PBBFAC,,, | Performed by: OBSTETRICS & GYNECOLOGY

## 2021-06-08 PROCEDURE — 99999 PR PBB SHADOW E&M-EST. PATIENT-LVL III: CPT | Mod: PBBFAC,,, | Performed by: OBSTETRICS & GYNECOLOGY

## 2021-06-08 PROCEDURE — 0502F PR SUBSEQUENT PRENATAL CARE: ICD-10-PCS | Mod: CPTII,S$GLB,, | Performed by: OBSTETRICS & GYNECOLOGY

## 2021-06-08 PROCEDURE — 0502F SUBSEQUENT PRENATAL CARE: CPT | Mod: CPTII,S$GLB,, | Performed by: OBSTETRICS & GYNECOLOGY

## 2021-06-08 RX ORDER — HALCINONIDE 1 MG/G
OINTMENT TOPICAL
COMMUNITY
Start: 2021-05-12 | End: 2021-07-30

## 2021-06-08 RX ORDER — BUPROPION HYDROCHLORIDE 75 MG/1
75 TABLET ORAL 2 TIMES DAILY
Qty: 180 TABLET | Refills: 3 | Status: SHIPPED | OUTPATIENT
Start: 2021-06-08 | End: 2021-07-30

## 2021-06-22 ENCOUNTER — PATIENT MESSAGE (OUTPATIENT)
Dept: OBSTETRICS AND GYNECOLOGY | Facility: CLINIC | Age: 36
End: 2021-06-22

## 2021-06-22 ENCOUNTER — PATIENT MESSAGE (OUTPATIENT)
Dept: ADMINISTRATIVE | Facility: OTHER | Age: 36
End: 2021-06-22

## 2021-06-22 ENCOUNTER — HOSPITAL ENCOUNTER (EMERGENCY)
Facility: OTHER | Age: 36
Discharge: HOME | End: 2021-06-22
Payer: COMMERCIAL

## 2021-06-22 VITALS — HEART RATE: 83 BPM | TEMPERATURE: 98 F | OXYGEN SATURATION: 100 %

## 2021-06-22 DIAGNOSIS — R11.2 NAUSEA AND VOMITING, INTRACTABILITY OF VOMITING NOT SPECIFIED, UNSPECIFIED VOMITING TYPE: Primary | ICD-10-CM

## 2021-06-22 DIAGNOSIS — Z3A.25 25 WEEKS GESTATION OF PREGNANCY: ICD-10-CM

## 2021-06-22 LAB
ALBUMIN SERPL BCP-MCNC: 3 G/DL (ref 3.5–5.2)
ALP SERPL-CCNC: 49 U/L (ref 55–135)
ALT SERPL W/O P-5'-P-CCNC: 10 U/L (ref 10–44)
ANION GAP SERPL CALC-SCNC: 12 MMOL/L (ref 8–16)
AST SERPL-CCNC: 12 U/L (ref 10–40)
BASOPHILS # BLD AUTO: 0.01 K/UL (ref 0–0.2)
BASOPHILS NFR BLD: 0.1 % (ref 0–1.9)
BILIRUB SERPL-MCNC: 0.6 MG/DL (ref 0.1–1)
BILIRUB SERPL-MCNC: NORMAL MG/DL
BLOOD URINE, POC: NORMAL
BUN SERPL-MCNC: 7 MG/DL (ref 6–20)
CALCIUM SERPL-MCNC: 8.4 MG/DL (ref 8.7–10.5)
CHLORIDE SERPL-SCNC: 105 MMOL/L (ref 95–110)
CO2 SERPL-SCNC: 17 MMOL/L (ref 23–29)
COLOR, POC UA: YELLOW
CREAT SERPL-MCNC: 0.7 MG/DL (ref 0.5–1.4)
DIFFERENTIAL METHOD: ABNORMAL
EOSINOPHIL # BLD AUTO: 0 K/UL (ref 0–0.5)
EOSINOPHIL NFR BLD: 0.6 % (ref 0–8)
ERYTHROCYTE [DISTWIDTH] IN BLOOD BY AUTOMATED COUNT: 12.6 % (ref 11.5–14.5)
EST. GFR  (AFRICAN AMERICAN): >60 ML/MIN/1.73 M^2
EST. GFR  (NON AFRICAN AMERICAN): >60 ML/MIN/1.73 M^2
GLUCOSE SERPL-MCNC: 86 MG/DL (ref 70–110)
GLUCOSE UR QL STRIP: NORMAL
HCT VFR BLD AUTO: 36.2 % (ref 37–48.5)
HGB BLD-MCNC: 12.2 G/DL (ref 12–16)
IMM GRANULOCYTES # BLD AUTO: 0.05 K/UL (ref 0–0.04)
IMM GRANULOCYTES NFR BLD AUTO: 0.7 % (ref 0–0.5)
KETONES UR QL STRIP: NORMAL
LEUKOCYTE ESTERASE URINE, POC: NORMAL
LYMPHOCYTES # BLD AUTO: 0.6 K/UL (ref 1–4.8)
LYMPHOCYTES NFR BLD: 8.9 % (ref 18–48)
MCH RBC QN AUTO: 30.1 PG (ref 27–31)
MCHC RBC AUTO-ENTMCNC: 33.7 G/DL (ref 32–36)
MCV RBC AUTO: 89 FL (ref 82–98)
MONOCYTES # BLD AUTO: 0.4 K/UL (ref 0.3–1)
MONOCYTES NFR BLD: 4.9 % (ref 4–15)
NEUTROPHILS # BLD AUTO: 6 K/UL (ref 1.8–7.7)
NEUTROPHILS NFR BLD: 84.8 % (ref 38–73)
NITRITE, POC UA: NORMAL
NRBC BLD-RTO: 0 /100 WBC
PH, POC UA: 5
PLATELET # BLD AUTO: 168 K/UL (ref 150–450)
PMV BLD AUTO: 9.6 FL (ref 9.2–12.9)
POTASSIUM SERPL-SCNC: 3.8 MMOL/L (ref 3.5–5.1)
PROT SERPL-MCNC: 6.5 G/DL (ref 6–8.4)
PROTEIN, POC: NORMAL
RBC # BLD AUTO: 4.05 M/UL (ref 4–5.4)
SARS-COV-2 RDRP RESP QL NAA+PROBE: NEGATIVE
SODIUM SERPL-SCNC: 134 MMOL/L (ref 136–145)
SPECIFIC GRAVITY, POC UA: 1.03
UROBILINOGEN, POC UA: NORMAL
WBC # BLD AUTO: 7.1 K/UL (ref 3.9–12.7)

## 2021-06-22 PROCEDURE — 59025 FETAL NON-STRESS TEST: CPT

## 2021-06-22 PROCEDURE — 80053 COMPREHEN METABOLIC PANEL: CPT | Performed by: STUDENT IN AN ORGANIZED HEALTH CARE EDUCATION/TRAINING PROGRAM

## 2021-06-22 PROCEDURE — 96375 TX/PRO/DX INJ NEW DRUG ADDON: CPT

## 2021-06-22 PROCEDURE — 99284 PR EMERGENCY DEPT VISIT,LEVEL IV: ICD-10-PCS | Mod: 25,,, | Performed by: OBSTETRICS & GYNECOLOGY

## 2021-06-22 PROCEDURE — 81002 URINALYSIS NONAUTO W/O SCOPE: CPT

## 2021-06-22 PROCEDURE — 99284 EMERGENCY DEPT VISIT MOD MDM: CPT | Mod: 25,,, | Performed by: OBSTETRICS & GYNECOLOGY

## 2021-06-22 PROCEDURE — U0002 COVID-19 LAB TEST NON-CDC: HCPCS | Performed by: STUDENT IN AN ORGANIZED HEALTH CARE EDUCATION/TRAINING PROGRAM

## 2021-06-22 PROCEDURE — 59025 PR FETAL 2N-STRESS TEST: ICD-10-PCS | Mod: 26,,, | Performed by: OBSTETRICS & GYNECOLOGY

## 2021-06-22 PROCEDURE — 99284 EMERGENCY DEPT VISIT MOD MDM: CPT | Mod: 25

## 2021-06-22 PROCEDURE — 59025 FETAL NON-STRESS TEST: CPT | Mod: 26,,, | Performed by: OBSTETRICS & GYNECOLOGY

## 2021-06-22 PROCEDURE — 85025 COMPLETE CBC W/AUTO DIFF WBC: CPT | Performed by: STUDENT IN AN ORGANIZED HEALTH CARE EDUCATION/TRAINING PROGRAM

## 2021-06-22 PROCEDURE — 25000003 PHARM REV CODE 250: Performed by: STUDENT IN AN ORGANIZED HEALTH CARE EDUCATION/TRAINING PROGRAM

## 2021-06-22 PROCEDURE — 96374 THER/PROPH/DIAG INJ IV PUSH: CPT

## 2021-06-22 PROCEDURE — 63600175 PHARM REV CODE 636 W HCPCS: Performed by: STUDENT IN AN ORGANIZED HEALTH CARE EDUCATION/TRAINING PROGRAM

## 2021-06-22 RX ORDER — FAMOTIDINE 10 MG/ML
20 INJECTION INTRAVENOUS ONCE
Status: COMPLETED | OUTPATIENT
Start: 2021-06-22 | End: 2021-06-22

## 2021-06-22 RX ORDER — PROMETHAZINE HYDROCHLORIDE 25 MG/1
25 TABLET ORAL
Qty: 30 TABLET | Refills: 1 | Status: SHIPPED | OUTPATIENT
Start: 2021-06-22 | End: 2021-07-22

## 2021-06-22 RX ORDER — ONDANSETRON 2 MG/ML
8 INJECTION INTRAMUSCULAR; INTRAVENOUS ONCE
Status: DISCONTINUED | OUTPATIENT
Start: 2021-06-22 | End: 2021-06-22

## 2021-06-22 RX ADMIN — FAMOTIDINE 20 MG: 10 INJECTION INTRAVENOUS at 11:06

## 2021-06-22 RX ADMIN — PROMETHAZINE HYDROCHLORIDE 12.5 MG: 25 INJECTION INTRAMUSCULAR; INTRAVENOUS at 11:06

## 2021-06-22 RX ADMIN — SODIUM CHLORIDE, SODIUM LACTATE, POTASSIUM CHLORIDE, AND CALCIUM CHLORIDE 1000 ML: .6; .31; .03; .02 INJECTION, SOLUTION INTRAVENOUS at 10:06

## 2021-06-24 ENCOUNTER — HOSPITAL ENCOUNTER (EMERGENCY)
Facility: OTHER | Age: 36
Discharge: HOME | End: 2021-06-24
Attending: OBSTETRICS & GYNECOLOGY
Payer: COMMERCIAL

## 2021-06-24 ENCOUNTER — TELEPHONE (OUTPATIENT)
Dept: OBSTETRICS AND GYNECOLOGY | Facility: CLINIC | Age: 36
End: 2021-06-24

## 2021-06-24 VITALS
RESPIRATION RATE: 18 BRPM | DIASTOLIC BLOOD PRESSURE: 52 MMHG | SYSTOLIC BLOOD PRESSURE: 106 MMHG | TEMPERATURE: 98 F | OXYGEN SATURATION: 99 % | HEART RATE: 74 BPM

## 2021-06-24 DIAGNOSIS — Z3A.25 25 WEEKS GESTATION OF PREGNANCY: ICD-10-CM

## 2021-06-24 DIAGNOSIS — E86.0 DEHYDRATION: Primary | ICD-10-CM

## 2021-06-24 LAB
ALBUMIN SERPL BCP-MCNC: 2.5 G/DL (ref 3.5–5.2)
ALP SERPL-CCNC: 53 U/L (ref 55–135)
ALT SERPL W/O P-5'-P-CCNC: 27 U/L (ref 10–44)
ANION GAP SERPL CALC-SCNC: 7 MMOL/L (ref 8–16)
AST SERPL-CCNC: 29 U/L (ref 10–40)
BILIRUB SERPL-MCNC: 0.1 MG/DL (ref 0.1–1)
BUN SERPL-MCNC: 4 MG/DL (ref 6–20)
CALCIUM SERPL-MCNC: 7.8 MG/DL (ref 8.7–10.5)
CHLORIDE SERPL-SCNC: 107 MMOL/L (ref 95–110)
CO2 SERPL-SCNC: 23 MMOL/L (ref 23–29)
CREAT SERPL-MCNC: 0.6 MG/DL (ref 0.5–1.4)
EST. GFR  (AFRICAN AMERICAN): >60 ML/MIN/1.73 M^2
EST. GFR  (NON AFRICAN AMERICAN): >60 ML/MIN/1.73 M^2
GLUCOSE SERPL-MCNC: 90 MG/DL (ref 70–110)
POTASSIUM SERPL-SCNC: 3.5 MMOL/L (ref 3.5–5.1)
PROT SERPL-MCNC: 5.9 G/DL (ref 6–8.4)
SODIUM SERPL-SCNC: 137 MMOL/L (ref 136–145)

## 2021-06-24 PROCEDURE — 80053 COMPREHEN METABOLIC PANEL: CPT | Performed by: STUDENT IN AN ORGANIZED HEALTH CARE EDUCATION/TRAINING PROGRAM

## 2021-06-24 PROCEDURE — 96372 THER/PROPH/DIAG INJ SC/IM: CPT | Mod: 59

## 2021-06-24 PROCEDURE — 59025 FETAL NON-STRESS TEST: CPT

## 2021-06-24 PROCEDURE — 99284 PR EMERGENCY DEPT VISIT,LEVEL IV: ICD-10-PCS | Mod: 25,,, | Performed by: OBSTETRICS & GYNECOLOGY

## 2021-06-24 PROCEDURE — 96361 HYDRATE IV INFUSION ADD-ON: CPT

## 2021-06-24 PROCEDURE — 96374 THER/PROPH/DIAG INJ IV PUSH: CPT

## 2021-06-24 PROCEDURE — 25000003 PHARM REV CODE 250: Performed by: STUDENT IN AN ORGANIZED HEALTH CARE EDUCATION/TRAINING PROGRAM

## 2021-06-24 PROCEDURE — 63600175 PHARM REV CODE 636 W HCPCS: Performed by: STUDENT IN AN ORGANIZED HEALTH CARE EDUCATION/TRAINING PROGRAM

## 2021-06-24 PROCEDURE — 99284 EMERGENCY DEPT VISIT MOD MDM: CPT | Mod: 25,,, | Performed by: OBSTETRICS & GYNECOLOGY

## 2021-06-24 PROCEDURE — 99284 EMERGENCY DEPT VISIT MOD MDM: CPT | Mod: 25

## 2021-06-24 PROCEDURE — 59025 FETAL NON-STRESS TEST: CPT | Mod: 26,,, | Performed by: OBSTETRICS & GYNECOLOGY

## 2021-06-24 PROCEDURE — 59025 PR FETAL 2N-STRESS TEST: ICD-10-PCS | Mod: 26,,, | Performed by: OBSTETRICS & GYNECOLOGY

## 2021-06-24 RX ORDER — FAMOTIDINE 10 MG/ML
40 INJECTION INTRAVENOUS ONCE
Status: COMPLETED | OUTPATIENT
Start: 2021-06-24 | End: 2021-06-24

## 2021-06-24 RX ORDER — DICYCLOMINE HYDROCHLORIDE 10 MG/ML
20 INJECTION INTRAMUSCULAR ONCE
Status: COMPLETED | OUTPATIENT
Start: 2021-06-24 | End: 2021-06-24

## 2021-06-24 RX ORDER — FAMOTIDINE 20 MG/1
20 TABLET, FILM COATED ORAL 2 TIMES DAILY
Qty: 20 TABLET | Refills: 0 | Status: SHIPPED | OUTPATIENT
Start: 2021-06-24 | End: 2022-05-09

## 2021-06-24 RX ORDER — FAMOTIDINE 20 MG/1
20 TABLET, FILM COATED ORAL 2 TIMES DAILY
Qty: 60 TABLET | Refills: 11 | Status: SHIPPED | OUTPATIENT
Start: 2021-06-24 | End: 2021-06-24 | Stop reason: SDUPTHER

## 2021-06-24 RX ORDER — DICYCLOMINE HYDROCHLORIDE 20 MG/1
20 TABLET ORAL 2 TIMES DAILY
Qty: 40 TABLET | Refills: 0 | Status: SHIPPED | OUTPATIENT
Start: 2021-06-24 | End: 2021-06-24 | Stop reason: SDUPTHER

## 2021-06-24 RX ORDER — DICYCLOMINE HYDROCHLORIDE 20 MG/1
20 TABLET ORAL 2 TIMES DAILY
Qty: 20 TABLET | Refills: 0 | Status: SHIPPED | OUTPATIENT
Start: 2021-06-24 | End: 2021-07-24

## 2021-06-24 RX ADMIN — DICYCLOMINE HYDROCHLORIDE 20 MG: 20 INJECTION, SOLUTION INTRAMUSCULAR at 03:06

## 2021-06-24 RX ADMIN — SODIUM CHLORIDE, SODIUM LACTATE, POTASSIUM CHLORIDE, AND CALCIUM CHLORIDE 1000 ML: .6; .31; .03; .02 INJECTION, SOLUTION INTRAVENOUS at 03:06

## 2021-06-24 RX ADMIN — FAMOTIDINE 40 MG: 10 INJECTION INTRAVENOUS at 03:06

## 2021-07-06 ENCOUNTER — LAB VISIT (OUTPATIENT)
Dept: LAB | Facility: OTHER | Age: 36
End: 2021-07-06
Attending: OBSTETRICS & GYNECOLOGY
Payer: COMMERCIAL

## 2021-07-06 ENCOUNTER — ROUTINE PRENATAL (OUTPATIENT)
Dept: OBSTETRICS AND GYNECOLOGY | Facility: CLINIC | Age: 36
End: 2021-07-06
Attending: OBSTETRICS & GYNECOLOGY
Payer: COMMERCIAL

## 2021-07-06 VITALS — WEIGHT: 155.19 LBS | DIASTOLIC BLOOD PRESSURE: 60 MMHG | SYSTOLIC BLOOD PRESSURE: 110 MMHG | BODY MASS INDEX: 23.6 KG/M2

## 2021-07-06 DIAGNOSIS — Z91.89 AT RISK FOR DEPRESSED MOOD DURING POSTPARTUM PERIOD: ICD-10-CM

## 2021-07-06 DIAGNOSIS — Z34.90 PREGNANCY WITH ONE FETUS, ANTEPARTUM: ICD-10-CM

## 2021-07-06 DIAGNOSIS — N84.1 CERVICAL POLYP: ICD-10-CM

## 2021-07-06 DIAGNOSIS — Z34.90 PREGNANCY WITH ONE FETUS, ANTEPARTUM: Primary | ICD-10-CM

## 2021-07-06 LAB
BASOPHILS # BLD AUTO: 0.02 K/UL (ref 0–0.2)
BASOPHILS NFR BLD: 0.2 % (ref 0–1.9)
DIFFERENTIAL METHOD: ABNORMAL
EOSINOPHIL # BLD AUTO: 0.1 K/UL (ref 0–0.5)
EOSINOPHIL NFR BLD: 1.6 % (ref 0–8)
ERYTHROCYTE [DISTWIDTH] IN BLOOD BY AUTOMATED COUNT: 12.4 % (ref 11.5–14.5)
GLUCOSE SERPL-MCNC: 116 MG/DL (ref 70–140)
HCT VFR BLD AUTO: 32.7 % (ref 37–48.5)
HGB BLD-MCNC: 10.8 G/DL (ref 12–16)
IMM GRANULOCYTES # BLD AUTO: 0.04 K/UL (ref 0–0.04)
IMM GRANULOCYTES NFR BLD AUTO: 0.5 % (ref 0–0.5)
LYMPHOCYTES # BLD AUTO: 1.4 K/UL (ref 1–4.8)
LYMPHOCYTES NFR BLD: 17.2 % (ref 18–48)
MCH RBC QN AUTO: 29.4 PG (ref 27–31)
MCHC RBC AUTO-ENTMCNC: 33 G/DL (ref 32–36)
MCV RBC AUTO: 89 FL (ref 82–98)
MONOCYTES # BLD AUTO: 0.4 K/UL (ref 0.3–1)
MONOCYTES NFR BLD: 5.2 % (ref 4–15)
NEUTROPHILS # BLD AUTO: 6.1 K/UL (ref 1.8–7.7)
NEUTROPHILS NFR BLD: 75.3 % (ref 38–73)
NRBC BLD-RTO: 0 /100 WBC
PLATELET # BLD AUTO: 221 K/UL (ref 150–450)
PMV BLD AUTO: 9.5 FL (ref 9.2–12.9)
RBC # BLD AUTO: 3.67 M/UL (ref 4–5.4)
WBC # BLD AUTO: 8.09 K/UL (ref 3.9–12.7)

## 2021-07-06 PROCEDURE — 36415 COLL VENOUS BLD VENIPUNCTURE: CPT | Performed by: OBSTETRICS & GYNECOLOGY

## 2021-07-06 PROCEDURE — 82950 GLUCOSE TEST: CPT | Performed by: OBSTETRICS & GYNECOLOGY

## 2021-07-06 PROCEDURE — 0502F SUBSEQUENT PRENATAL CARE: CPT | Mod: CPTII,S$GLB,, | Performed by: OBSTETRICS & GYNECOLOGY

## 2021-07-06 PROCEDURE — 99999 PR PBB SHADOW E&M-EST. PATIENT-LVL III: ICD-10-PCS | Mod: PBBFAC,,, | Performed by: OBSTETRICS & GYNECOLOGY

## 2021-07-06 PROCEDURE — 0502F PR SUBSEQUENT PRENATAL CARE: ICD-10-PCS | Mod: CPTII,S$GLB,, | Performed by: OBSTETRICS & GYNECOLOGY

## 2021-07-06 PROCEDURE — 85025 COMPLETE CBC W/AUTO DIFF WBC: CPT | Performed by: OBSTETRICS & GYNECOLOGY

## 2021-07-06 PROCEDURE — 99999 PR PBB SHADOW E&M-EST. PATIENT-LVL III: CPT | Mod: PBBFAC,,, | Performed by: OBSTETRICS & GYNECOLOGY

## 2021-07-09 ENCOUNTER — TELEPHONE (OUTPATIENT)
Dept: OBSTETRICS AND GYNECOLOGY | Facility: CLINIC | Age: 36
End: 2021-07-09

## 2021-07-09 ENCOUNTER — HOSPITAL ENCOUNTER (EMERGENCY)
Facility: OTHER | Age: 36
Discharge: HOME | End: 2021-07-09
Attending: OBSTETRICS & GYNECOLOGY
Payer: COMMERCIAL

## 2021-07-09 ENCOUNTER — PATIENT MESSAGE (OUTPATIENT)
Dept: OBSTETRICS AND GYNECOLOGY | Facility: CLINIC | Age: 36
End: 2021-07-09

## 2021-07-09 VITALS
TEMPERATURE: 98 F | HEART RATE: 81 BPM | DIASTOLIC BLOOD PRESSURE: 61 MMHG | SYSTOLIC BLOOD PRESSURE: 98 MMHG | OXYGEN SATURATION: 100 %
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
## 2021-07-09 DIAGNOSIS — Z3A.27 27 WEEKS GESTATION OF PREGNANCY: Primary | ICD-10-CM

## 2021-07-09 PROCEDURE — 99284 PR EMERGENCY DEPT VISIT,LEVEL IV: ICD-10-PCS | Mod: 25,,, | Performed by: OBSTETRICS & GYNECOLOGY

## 2021-07-09 PROCEDURE — 59025 PR FETAL 2N-STRESS TEST: ICD-10-PCS | Mod: 26,,, | Performed by: OBSTETRICS & GYNECOLOGY

## 2021-07-09 PROCEDURE — 59025 FETAL NON-STRESS TEST: CPT

## 2021-07-09 PROCEDURE — 99284 EMERGENCY DEPT VISIT MOD MDM: CPT | Mod: 25,,, | Performed by: OBSTETRICS & GYNECOLOGY

## 2021-07-09 PROCEDURE — 99284 EMERGENCY DEPT VISIT MOD MDM: CPT | Mod: 25

## 2021-07-09 PROCEDURE — 59025 FETAL NON-STRESS TEST: CPT | Mod: 26,,, | Performed by: OBSTETRICS & GYNECOLOGY

## 2021-07-13 ENCOUNTER — PATIENT MESSAGE (OUTPATIENT)
Dept: ADMINISTRATIVE | Facility: OTHER | Age: 36
End: 2021-07-13

## 2021-07-14 ENCOUNTER — PATIENT MESSAGE (OUTPATIENT)
Dept: OBSTETRICS AND GYNECOLOGY | Facility: CLINIC | Age: 36
End: 2021-07-14

## 2021-07-30 ENCOUNTER — ROUTINE PRENATAL (OUTPATIENT)
Dept: OBSTETRICS AND GYNECOLOGY | Facility: CLINIC | Age: 36
End: 2021-07-30
Attending: OBSTETRICS & GYNECOLOGY
Payer: COMMERCIAL

## 2021-07-30 VITALS — WEIGHT: 158.75 LBS | BODY MASS INDEX: 24.14 KG/M2 | SYSTOLIC BLOOD PRESSURE: 98 MMHG | DIASTOLIC BLOOD PRESSURE: 60 MMHG

## 2021-07-30 DIAGNOSIS — Z34.93 PREGNANCY WITH ONE FETUS, THIRD TRIMESTER: Primary | ICD-10-CM

## 2021-07-30 DIAGNOSIS — Z34.92 PREGNANCY WITH ONE FETUS IN SECOND TRIMESTER: ICD-10-CM

## 2021-07-30 PROCEDURE — 0502F PR SUBSEQUENT PRENATAL CARE: ICD-10-PCS | Mod: CPTII,S$GLB,, | Performed by: OBSTETRICS & GYNECOLOGY

## 2021-07-30 PROCEDURE — 0502F SUBSEQUENT PRENATAL CARE: CPT | Mod: CPTII,S$GLB,, | Performed by: OBSTETRICS & GYNECOLOGY

## 2021-07-30 PROCEDURE — 99999 PR PBB SHADOW E&M-EST. PATIENT-LVL II: CPT | Mod: PBBFAC,,, | Performed by: OBSTETRICS & GYNECOLOGY

## 2021-07-30 PROCEDURE — 99999 PR PBB SHADOW E&M-EST. PATIENT-LVL II: ICD-10-PCS | Mod: PBBFAC,,, | Performed by: OBSTETRICS & GYNECOLOGY

## 2021-08-03 ENCOUNTER — LAB VISIT (OUTPATIENT)
Dept: PRIMARY CARE CLINIC | Facility: CLINIC | Age: 36
End: 2021-08-03
Payer: COMMERCIAL

## 2021-08-03 ENCOUNTER — PATIENT MESSAGE (OUTPATIENT)
Dept: OBSTETRICS AND GYNECOLOGY | Facility: CLINIC | Age: 36
End: 2021-08-03

## 2021-08-03 ENCOUNTER — LAB VISIT (OUTPATIENT)
Dept: LAB | Facility: OTHER | Age: 36
End: 2021-08-03
Attending: OBSTETRICS & GYNECOLOGY
Payer: COMMERCIAL

## 2021-08-03 DIAGNOSIS — R52 BODY ACHES: ICD-10-CM

## 2021-08-03 DIAGNOSIS — R53.1 WEAKNESS: ICD-10-CM

## 2021-08-03 DIAGNOSIS — R53.83 OTHER FATIGUE: ICD-10-CM

## 2021-08-03 DIAGNOSIS — R42 DIZZINESS: ICD-10-CM

## 2021-08-03 DIAGNOSIS — R53.83 OTHER FATIGUE: Primary | ICD-10-CM

## 2021-08-03 DIAGNOSIS — R05.9 COUGH: ICD-10-CM

## 2021-08-03 LAB
BASOPHILS # BLD AUTO: 0.03 K/UL (ref 0–0.2)
BASOPHILS NFR BLD: 0.3 % (ref 0–1.9)
DIFFERENTIAL METHOD: ABNORMAL
EOSINOPHIL # BLD AUTO: 0.1 K/UL (ref 0–0.5)
EOSINOPHIL NFR BLD: 1.4 % (ref 0–8)
ERYTHROCYTE [DISTWIDTH] IN BLOOD BY AUTOMATED COUNT: 14.5 % (ref 11.5–14.5)
FERRITIN SERPL-MCNC: 9 NG/ML (ref 20–300)
HCT VFR BLD AUTO: 34 % (ref 37–48.5)
HGB BLD-MCNC: 11.2 G/DL (ref 12–16)
IMM GRANULOCYTES # BLD AUTO: 0.08 K/UL (ref 0–0.04)
IMM GRANULOCYTES NFR BLD AUTO: 0.8 % (ref 0–0.5)
IRON SERPL-MCNC: 195 UG/DL (ref 30–160)
IRON SERPL-MCNC: 195 UG/DL (ref 30–160)
LYMPHOCYTES # BLD AUTO: 1.7 K/UL (ref 1–4.8)
LYMPHOCYTES NFR BLD: 17.8 % (ref 18–48)
MCH RBC QN AUTO: 30 PG (ref 27–31)
MCHC RBC AUTO-ENTMCNC: 32.9 G/DL (ref 32–36)
MCV RBC AUTO: 91 FL (ref 82–98)
MONOCYTES # BLD AUTO: 0.6 K/UL (ref 0.3–1)
MONOCYTES NFR BLD: 5.7 % (ref 4–15)
NEUTROPHILS # BLD AUTO: 7.2 K/UL (ref 1.8–7.7)
NEUTROPHILS NFR BLD: 74 % (ref 38–73)
NRBC BLD-RTO: 0 /100 WBC
PLATELET # BLD AUTO: 209 K/UL (ref 150–450)
PMV BLD AUTO: 9.3 FL (ref 9.2–12.9)
RBC # BLD AUTO: 3.73 M/UL (ref 4–5.4)
SATURATED IRON: 39 % (ref 20–50)
TOTAL IRON BINDING CAPACITY: 502 UG/DL (ref 250–450)
TRANSFERRIN SERPL-MCNC: 339 MG/DL (ref 200–375)
WBC # BLD AUTO: 9.73 K/UL (ref 3.9–12.7)

## 2021-08-03 PROCEDURE — U0003 INFECTIOUS AGENT DETECTION BY NUCLEIC ACID (DNA OR RNA); SEVERE ACUTE RESPIRATORY SYNDROME CORONAVIRUS 2 (SARS-COV-2) (CORONAVIRUS DISEASE [COVID-19]), AMPLIFIED PROBE TECHNIQUE, MAKING USE OF HIGH THROUGHPUT TECHNOLOGIES AS DESCRIBED BY CMS-2020-01-R: HCPCS | Performed by: INTERNAL MEDICINE

## 2021-08-03 PROCEDURE — 36415 COLL VENOUS BLD VENIPUNCTURE: CPT | Performed by: OBSTETRICS & GYNECOLOGY

## 2021-08-03 PROCEDURE — 82728 ASSAY OF FERRITIN: CPT | Performed by: OBSTETRICS & GYNECOLOGY

## 2021-08-03 PROCEDURE — 85025 COMPLETE CBC W/AUTO DIFF WBC: CPT | Performed by: OBSTETRICS & GYNECOLOGY

## 2021-08-03 PROCEDURE — 83540 ASSAY OF IRON: CPT | Performed by: OBSTETRICS & GYNECOLOGY

## 2021-08-03 PROCEDURE — U0005 INFEC AGEN DETEC AMPLI PROBE: HCPCS | Performed by: INTERNAL MEDICINE

## 2021-08-04 ENCOUNTER — PATIENT MESSAGE (OUTPATIENT)
Dept: HEMATOLOGY/ONCOLOGY | Facility: CLINIC | Age: 36
End: 2021-08-04

## 2021-08-04 ENCOUNTER — TELEPHONE (OUTPATIENT)
Dept: HEMATOLOGY/ONCOLOGY | Facility: CLINIC | Age: 36
End: 2021-08-04

## 2021-08-04 ENCOUNTER — PATIENT MESSAGE (OUTPATIENT)
Dept: OBSTETRICS AND GYNECOLOGY | Facility: CLINIC | Age: 36
End: 2021-08-04

## 2021-08-04 DIAGNOSIS — O99.019 ANEMIA AFFECTING FOURTH PREGNANCY: Primary | ICD-10-CM

## 2021-08-05 ENCOUNTER — TELEPHONE (OUTPATIENT)
Dept: MATERNAL FETAL MEDICINE | Facility: CLINIC | Age: 36
End: 2021-08-05

## 2021-08-05 ENCOUNTER — OFFICE VISIT (OUTPATIENT)
Dept: HEMATOLOGY/ONCOLOGY | Facility: CLINIC | Age: 36
End: 2021-08-05
Payer: COMMERCIAL

## 2021-08-05 DIAGNOSIS — O99.019 ANEMIA AFFECTING FOURTH PREGNANCY: ICD-10-CM

## 2021-08-05 LAB
SARS-COV-2 RNA RESP QL NAA+PROBE: NOT DETECTED
SARS-COV-2- CYCLE NUMBER: -1

## 2021-08-05 PROCEDURE — 99204 PR OFFICE/OUTPT VISIT, NEW, LEVL IV, 45-59 MIN: ICD-10-PCS | Mod: 95,,, | Performed by: INTERNAL MEDICINE

## 2021-08-05 PROCEDURE — 99204 OFFICE O/P NEW MOD 45 MIN: CPT | Mod: 95,,, | Performed by: INTERNAL MEDICINE

## 2021-08-05 RX ORDER — METHYLPREDNISOLONE SOD SUCC 125 MG
125 VIAL (EA) INJECTION ONCE AS NEEDED
Status: CANCELLED | OUTPATIENT
Start: 2021-08-05

## 2021-08-05 RX ORDER — SODIUM CHLORIDE 9 MG/ML
INJECTION, SOLUTION INTRAVENOUS CONTINUOUS
Status: CANCELLED | OUTPATIENT
Start: 2021-08-05

## 2021-08-05 RX ORDER — DIPHENHYDRAMINE HYDROCHLORIDE 50 MG/ML
50 INJECTION INTRAMUSCULAR; INTRAVENOUS ONCE AS NEEDED
Status: CANCELLED | OUTPATIENT
Start: 2021-08-05

## 2021-08-05 RX ORDER — EPINEPHRINE 0.3 MG/.3ML
0.3 INJECTION SUBCUTANEOUS ONCE AS NEEDED
Status: CANCELLED | OUTPATIENT
Start: 2021-08-05

## 2021-08-05 RX ORDER — HEPARIN 100 UNIT/ML
5 SYRINGE INTRAVENOUS
Status: CANCELLED | OUTPATIENT
Start: 2021-08-05

## 2021-08-05 RX ORDER — SODIUM CHLORIDE 0.9 % (FLUSH) 0.9 %
10 SYRINGE (ML) INJECTION
Status: CANCELLED | OUTPATIENT
Start: 2021-08-05

## 2021-08-05 RX ORDER — HEPARIN 100 UNIT/ML
500 SYRINGE INTRAVENOUS
Status: CANCELLED | OUTPATIENT
Start: 2021-08-05

## 2021-08-06 ENCOUNTER — TELEPHONE (OUTPATIENT)
Dept: INFUSION THERAPY | Facility: OTHER | Age: 36
End: 2021-08-06

## 2021-08-09 ENCOUNTER — PATIENT MESSAGE (OUTPATIENT)
Dept: OBSTETRICS AND GYNECOLOGY | Facility: CLINIC | Age: 36
End: 2021-08-09

## 2021-08-09 ENCOUNTER — PATIENT MESSAGE (OUTPATIENT)
Dept: HEMATOLOGY/ONCOLOGY | Facility: CLINIC | Age: 36
End: 2021-08-09

## 2021-08-09 RX ORDER — SODIUM CHLORIDE 0.9 % (FLUSH) 0.9 %
10 SYRINGE (ML) INJECTION
Status: CANCELLED | OUTPATIENT
Start: 2021-08-09

## 2021-08-09 RX ORDER — HEPARIN 100 UNIT/ML
500 SYRINGE INTRAVENOUS
Status: CANCELLED | OUTPATIENT
Start: 2021-08-09

## 2021-08-09 RX ORDER — DIPHENHYDRAMINE HYDROCHLORIDE 50 MG/ML
50 INJECTION INTRAMUSCULAR; INTRAVENOUS ONCE AS NEEDED
Status: CANCELLED | OUTPATIENT
Start: 2021-08-09

## 2021-08-09 RX ORDER — METHYLPREDNISOLONE SOD SUCC 125 MG
125 VIAL (EA) INJECTION ONCE AS NEEDED
Status: CANCELLED | OUTPATIENT
Start: 2021-08-09

## 2021-08-09 RX ORDER — EPINEPHRINE 0.3 MG/.3ML
0.3 INJECTION SUBCUTANEOUS ONCE AS NEEDED
Status: CANCELLED | OUTPATIENT
Start: 2021-08-09

## 2021-08-10 ENCOUNTER — PATIENT MESSAGE (OUTPATIENT)
Dept: MATERNAL FETAL MEDICINE | Facility: CLINIC | Age: 36
End: 2021-08-10

## 2021-08-10 ENCOUNTER — PATIENT MESSAGE (OUTPATIENT)
Dept: ADMINISTRATIVE | Facility: OTHER | Age: 36
End: 2021-08-10

## 2021-08-10 ENCOUNTER — PATIENT MESSAGE (OUTPATIENT)
Dept: OBSTETRICS AND GYNECOLOGY | Facility: CLINIC | Age: 36
End: 2021-08-10

## 2021-08-10 ENCOUNTER — INFUSION (OUTPATIENT)
Dept: INFUSION THERAPY | Facility: OTHER | Age: 36
End: 2021-08-10
Attending: STUDENT IN AN ORGANIZED HEALTH CARE EDUCATION/TRAINING PROGRAM
Payer: COMMERCIAL

## 2021-08-10 VITALS
RESPIRATION RATE: 18 BRPM | BODY MASS INDEX: 25.06 KG/M2 | OXYGEN SATURATION: 99 % | SYSTOLIC BLOOD PRESSURE: 104 MMHG | HEIGHT: 67 IN | TEMPERATURE: 98 F | WEIGHT: 159.63 LBS | DIASTOLIC BLOOD PRESSURE: 62 MMHG | HEART RATE: 76 BPM

## 2021-08-10 DIAGNOSIS — O99.019 ANEMIA AFFECTING FOURTH PREGNANCY: Primary | ICD-10-CM

## 2021-08-10 PROCEDURE — 63600175 PHARM REV CODE 636 W HCPCS: Performed by: INTERNAL MEDICINE

## 2021-08-10 PROCEDURE — 96365 THER/PROPH/DIAG IV INF INIT: CPT

## 2021-08-10 PROCEDURE — 25000003 PHARM REV CODE 250: Performed by: INTERNAL MEDICINE

## 2021-08-10 RX ORDER — HEPARIN 100 UNIT/ML
500 SYRINGE INTRAVENOUS
Status: DISCONTINUED | OUTPATIENT
Start: 2021-08-10 | End: 2021-08-10 | Stop reason: HOSPADM

## 2021-08-10 RX ORDER — METHYLPREDNISOLONE SOD SUCC 125 MG
125 VIAL (EA) INJECTION ONCE AS NEEDED
Status: CANCELLED | OUTPATIENT
Start: 2021-08-17

## 2021-08-10 RX ORDER — SODIUM CHLORIDE 0.9 % (FLUSH) 0.9 %
10 SYRINGE (ML) INJECTION
Status: DISCONTINUED | OUTPATIENT
Start: 2021-08-10 | End: 2021-08-10 | Stop reason: HOSPADM

## 2021-08-10 RX ORDER — SODIUM CHLORIDE 0.9 % (FLUSH) 0.9 %
10 SYRINGE (ML) INJECTION
Status: CANCELLED | OUTPATIENT
Start: 2021-08-17

## 2021-08-10 RX ORDER — HEPARIN 100 UNIT/ML
500 SYRINGE INTRAVENOUS
Status: CANCELLED | OUTPATIENT
Start: 2021-08-17

## 2021-08-10 RX ORDER — EPINEPHRINE 0.3 MG/.3ML
0.3 INJECTION SUBCUTANEOUS ONCE AS NEEDED
Status: CANCELLED | OUTPATIENT
Start: 2021-08-17

## 2021-08-10 RX ORDER — DIPHENHYDRAMINE HYDROCHLORIDE 50 MG/ML
50 INJECTION INTRAMUSCULAR; INTRAVENOUS ONCE AS NEEDED
Status: CANCELLED | OUTPATIENT
Start: 2021-08-17

## 2021-08-10 RX ADMIN — IRON SUCROSE 200 MG: 20 INJECTION, SOLUTION INTRAVENOUS at 09:08

## 2021-08-10 RX ADMIN — SODIUM CHLORIDE: 0.9 INJECTION, SOLUTION INTRAVENOUS at 09:08

## 2021-08-11 ENCOUNTER — PROCEDURE VISIT (OUTPATIENT)
Dept: MATERNAL FETAL MEDICINE | Facility: CLINIC | Age: 36
End: 2021-08-11
Payer: COMMERCIAL

## 2021-08-11 ENCOUNTER — PATIENT MESSAGE (OUTPATIENT)
Dept: OBSTETRICS AND GYNECOLOGY | Facility: CLINIC | Age: 36
End: 2021-08-11

## 2021-08-11 DIAGNOSIS — Z36.9 ENCOUNTER FOR FETAL ULTRASOUND: ICD-10-CM

## 2021-08-11 DIAGNOSIS — O09.523 MULTIGRAVIDA OF ADVANCED MATERNAL AGE IN THIRD TRIMESTER: ICD-10-CM

## 2021-08-11 PROCEDURE — 76816 OB US FOLLOW-UP PER FETUS: CPT | Mod: S$GLB,,, | Performed by: OBSTETRICS & GYNECOLOGY

## 2021-08-11 PROCEDURE — 76816 PR  US,PREGNANT UTERUS,F/U,TRANSABD APP: ICD-10-PCS | Mod: S$GLB,,, | Performed by: OBSTETRICS & GYNECOLOGY

## 2021-08-17 ENCOUNTER — INFUSION (OUTPATIENT)
Dept: INFUSION THERAPY | Facility: OTHER | Age: 36
End: 2021-08-17
Attending: STUDENT IN AN ORGANIZED HEALTH CARE EDUCATION/TRAINING PROGRAM
Payer: COMMERCIAL

## 2021-08-17 VITALS
SYSTOLIC BLOOD PRESSURE: 101 MMHG | RESPIRATION RATE: 16 BRPM | OXYGEN SATURATION: 97 % | TEMPERATURE: 98 F | DIASTOLIC BLOOD PRESSURE: 62 MMHG | HEART RATE: 93 BPM

## 2021-08-17 DIAGNOSIS — O99.019 ANEMIA AFFECTING FOURTH PREGNANCY: Primary | ICD-10-CM

## 2021-08-17 PROCEDURE — 25000003 PHARM REV CODE 250: Performed by: INTERNAL MEDICINE

## 2021-08-17 PROCEDURE — 63600175 PHARM REV CODE 636 W HCPCS: Performed by: INTERNAL MEDICINE

## 2021-08-17 PROCEDURE — 96365 THER/PROPH/DIAG IV INF INIT: CPT

## 2021-08-17 RX ORDER — SODIUM CHLORIDE 0.9 % (FLUSH) 0.9 %
10 SYRINGE (ML) INJECTION
Status: CANCELLED | OUTPATIENT
Start: 2021-08-24

## 2021-08-17 RX ORDER — SODIUM CHLORIDE 0.9 % (FLUSH) 0.9 %
10 SYRINGE (ML) INJECTION
Status: DISCONTINUED | OUTPATIENT
Start: 2021-08-17 | End: 2021-08-17 | Stop reason: HOSPADM

## 2021-08-17 RX ORDER — METHYLPREDNISOLONE SOD SUCC 125 MG
125 VIAL (EA) INJECTION ONCE AS NEEDED
Status: CANCELLED | OUTPATIENT
Start: 2021-08-24

## 2021-08-17 RX ORDER — DIPHENHYDRAMINE HYDROCHLORIDE 50 MG/ML
50 INJECTION INTRAMUSCULAR; INTRAVENOUS ONCE AS NEEDED
Status: CANCELLED | OUTPATIENT
Start: 2021-08-24

## 2021-08-17 RX ORDER — HEPARIN 100 UNIT/ML
500 SYRINGE INTRAVENOUS
Status: DISCONTINUED | OUTPATIENT
Start: 2021-08-17 | End: 2021-08-17 | Stop reason: HOSPADM

## 2021-08-17 RX ORDER — HEPARIN 100 UNIT/ML
500 SYRINGE INTRAVENOUS
Status: CANCELLED | OUTPATIENT
Start: 2021-08-24

## 2021-08-17 RX ORDER — EPINEPHRINE 0.3 MG/.3ML
0.3 INJECTION SUBCUTANEOUS ONCE AS NEEDED
Status: CANCELLED | OUTPATIENT
Start: 2021-08-24

## 2021-08-17 RX ADMIN — SODIUM CHLORIDE: 0.9 INJECTION, SOLUTION INTRAVENOUS at 09:08

## 2021-08-17 RX ADMIN — IRON SUCROSE 200 MG: 20 INJECTION, SOLUTION INTRAVENOUS at 09:08

## 2021-08-24 ENCOUNTER — INFUSION (OUTPATIENT)
Dept: INFUSION THERAPY | Facility: OTHER | Age: 36
End: 2021-08-24
Attending: STUDENT IN AN ORGANIZED HEALTH CARE EDUCATION/TRAINING PROGRAM
Payer: COMMERCIAL

## 2021-08-24 ENCOUNTER — ROUTINE PRENATAL (OUTPATIENT)
Dept: OBSTETRICS AND GYNECOLOGY | Facility: CLINIC | Age: 36
End: 2021-08-24
Attending: OBSTETRICS & GYNECOLOGY
Payer: COMMERCIAL

## 2021-08-24 VITALS
DIASTOLIC BLOOD PRESSURE: 99 MMHG | OXYGEN SATURATION: 99 % | TEMPERATURE: 98 F | RESPIRATION RATE: 17 BRPM | HEART RATE: 97 BPM | SYSTOLIC BLOOD PRESSURE: 114 MMHG

## 2021-08-24 VITALS
SYSTOLIC BLOOD PRESSURE: 102 MMHG | DIASTOLIC BLOOD PRESSURE: 60 MMHG | WEIGHT: 165.81 LBS | BODY MASS INDEX: 25.97 KG/M2

## 2021-08-24 DIAGNOSIS — O99.019 ANEMIA AFFECTING FOURTH PREGNANCY: Primary | ICD-10-CM

## 2021-08-24 DIAGNOSIS — O36.8130 DECREASED FETAL MOVEMENTS IN THIRD TRIMESTER, SINGLE OR UNSPECIFIED FETUS: Primary | ICD-10-CM

## 2021-08-24 PROCEDURE — 96365 THER/PROPH/DIAG IV INF INIT: CPT

## 2021-08-24 PROCEDURE — 63600175 PHARM REV CODE 636 W HCPCS: Performed by: INTERNAL MEDICINE

## 2021-08-24 PROCEDURE — 25000003 PHARM REV CODE 250: Performed by: INTERNAL MEDICINE

## 2021-08-24 PROCEDURE — 99999 PR PBB SHADOW E&M-EST. PATIENT-LVL II: ICD-10-PCS | Mod: PBBFAC,,, | Performed by: OBSTETRICS & GYNECOLOGY

## 2021-08-24 PROCEDURE — 0502F PR SUBSEQUENT PRENATAL CARE: ICD-10-PCS | Mod: CPTII,S$GLB,, | Performed by: OBSTETRICS & GYNECOLOGY

## 2021-08-24 PROCEDURE — 0502F SUBSEQUENT PRENATAL CARE: CPT | Mod: CPTII,S$GLB,, | Performed by: OBSTETRICS & GYNECOLOGY

## 2021-08-24 PROCEDURE — 99999 PR PBB SHADOW E&M-EST. PATIENT-LVL II: CPT | Mod: PBBFAC,,, | Performed by: OBSTETRICS & GYNECOLOGY

## 2021-08-24 RX ORDER — DIPHENHYDRAMINE HYDROCHLORIDE 50 MG/ML
50 INJECTION INTRAMUSCULAR; INTRAVENOUS ONCE AS NEEDED
Status: CANCELLED | OUTPATIENT
Start: 2021-08-31

## 2021-08-24 RX ORDER — METHYLPREDNISOLONE SOD SUCC 125 MG
125 VIAL (EA) INJECTION ONCE AS NEEDED
Status: CANCELLED | OUTPATIENT
Start: 2021-08-31

## 2021-08-24 RX ORDER — SODIUM CHLORIDE 0.9 % (FLUSH) 0.9 %
10 SYRINGE (ML) INJECTION
Status: CANCELLED | OUTPATIENT
Start: 2021-08-31

## 2021-08-24 RX ORDER — SODIUM CHLORIDE 0.9 % (FLUSH) 0.9 %
10 SYRINGE (ML) INJECTION
Status: DISCONTINUED | OUTPATIENT
Start: 2021-08-24 | End: 2021-08-24 | Stop reason: HOSPADM

## 2021-08-24 RX ORDER — HEPARIN 100 UNIT/ML
500 SYRINGE INTRAVENOUS
Status: CANCELLED | OUTPATIENT
Start: 2021-08-31

## 2021-08-24 RX ORDER — HEPARIN 100 UNIT/ML
500 SYRINGE INTRAVENOUS
Status: DISCONTINUED | OUTPATIENT
Start: 2021-08-24 | End: 2021-08-24 | Stop reason: HOSPADM

## 2021-08-24 RX ORDER — EPINEPHRINE 0.3 MG/.3ML
0.3 INJECTION SUBCUTANEOUS ONCE AS NEEDED
Status: CANCELLED | OUTPATIENT
Start: 2021-08-31

## 2021-08-24 RX ADMIN — SODIUM CHLORIDE: 0.9 INJECTION, SOLUTION INTRAVENOUS at 09:08

## 2021-08-24 RX ADMIN — IRON SUCROSE 200 MG: 20 INJECTION, SOLUTION INTRAVENOUS at 09:08

## 2021-09-06 ENCOUNTER — PATIENT MESSAGE (OUTPATIENT)
Dept: OBSTETRICS AND GYNECOLOGY | Facility: CLINIC | Age: 36
End: 2021-09-06

## 2021-09-08 ENCOUNTER — HOSPITAL ENCOUNTER (EMERGENCY)
Facility: OTHER | Age: 36
Discharge: HOME | End: 2021-09-08
Attending: OBSTETRICS & GYNECOLOGY
Payer: COMMERCIAL

## 2021-09-08 ENCOUNTER — INFUSION (OUTPATIENT)
Dept: INFUSION THERAPY | Facility: OTHER | Age: 36
End: 2021-09-08
Attending: STUDENT IN AN ORGANIZED HEALTH CARE EDUCATION/TRAINING PROGRAM
Payer: COMMERCIAL

## 2021-09-08 ENCOUNTER — TELEPHONE (OUTPATIENT)
Dept: OBSTETRICS AND GYNECOLOGY | Facility: CLINIC | Age: 36
End: 2021-09-08

## 2021-09-08 VITALS
TEMPERATURE: 98 F | SYSTOLIC BLOOD PRESSURE: 113 MMHG | RESPIRATION RATE: 18 BRPM | OXYGEN SATURATION: 98 % | HEART RATE: 88 BPM | DIASTOLIC BLOOD PRESSURE: 68 MMHG

## 2021-09-08 VITALS
DIASTOLIC BLOOD PRESSURE: 57 MMHG | BODY MASS INDEX: 26.68 KG/M2 | TEMPERATURE: 98 F | HEIGHT: 67 IN | WEIGHT: 170 LBS | HEART RATE: 97 BPM | RESPIRATION RATE: 20 BRPM | OXYGEN SATURATION: 95 % | SYSTOLIC BLOOD PRESSURE: 96 MMHG

## 2021-09-08 DIAGNOSIS — O26.813 PREGNANCY RELATED FATIGUE IN THIRD TRIMESTER: ICD-10-CM

## 2021-09-08 DIAGNOSIS — O99.019 ANEMIA AFFECTING FOURTH PREGNANCY: Primary | ICD-10-CM

## 2021-09-08 DIAGNOSIS — D50.8 IRON DEFICIENCY ANEMIA SECONDARY TO INADEQUATE DIETARY IRON INTAKE: Primary | ICD-10-CM

## 2021-09-08 DIAGNOSIS — R00.2 PALPITATIONS: ICD-10-CM

## 2021-09-08 DIAGNOSIS — Z3A.36 36 WEEKS GESTATION OF PREGNANCY: ICD-10-CM

## 2021-09-08 LAB
BILIRUB SERPL-MCNC: NORMAL MG/DL
BLOOD URINE, POC: NORMAL
COLOR, POC UA: YELLOW
GLUCOSE UR QL STRIP: NORMAL
KETONES UR QL STRIP: NORMAL
LEUKOCYTE ESTERASE URINE, POC: NORMAL
NITRITE, POC UA: NORMAL
PH, POC UA: 7
PROTEIN, POC: NORMAL
SPECIFIC GRAVITY, POC UA: 1
UROBILINOGEN, POC UA: NORMAL

## 2021-09-08 PROCEDURE — 81002 URINALYSIS NONAUTO W/O SCOPE: CPT

## 2021-09-08 PROCEDURE — 59025 FETAL NON-STRESS TEST: CPT | Mod: 26,,, | Performed by: OBSTETRICS & GYNECOLOGY

## 2021-09-08 PROCEDURE — 96365 THER/PROPH/DIAG IV INF INIT: CPT

## 2021-09-08 PROCEDURE — 59025 PR FETAL 2N-STRESS TEST: ICD-10-PCS | Mod: 26,,, | Performed by: OBSTETRICS & GYNECOLOGY

## 2021-09-08 PROCEDURE — 96361 HYDRATE IV INFUSION ADD-ON: CPT

## 2021-09-08 PROCEDURE — 93005 ELECTROCARDIOGRAM TRACING: CPT | Mod: 59

## 2021-09-08 PROCEDURE — 25000003 PHARM REV CODE 250: Performed by: INTERNAL MEDICINE

## 2021-09-08 PROCEDURE — 99283 PR EMERGENCY DEPT VISIT,LEVEL III: ICD-10-PCS | Mod: 25,,, | Performed by: OBSTETRICS & GYNECOLOGY

## 2021-09-08 PROCEDURE — 63600175 PHARM REV CODE 636 W HCPCS: Performed by: INTERNAL MEDICINE

## 2021-09-08 PROCEDURE — 93010 EKG 12-LEAD: ICD-10-PCS | Mod: ,,, | Performed by: INTERNAL MEDICINE

## 2021-09-08 PROCEDURE — 87081 CULTURE SCREEN ONLY: CPT | Performed by: STUDENT IN AN ORGANIZED HEALTH CARE EDUCATION/TRAINING PROGRAM

## 2021-09-08 PROCEDURE — 93010 ELECTROCARDIOGRAM REPORT: CPT | Mod: ,,, | Performed by: INTERNAL MEDICINE

## 2021-09-08 PROCEDURE — 59025 FETAL NON-STRESS TEST: CPT

## 2021-09-08 PROCEDURE — 99284 EMERGENCY DEPT VISIT MOD MDM: CPT | Mod: 25

## 2021-09-08 PROCEDURE — 99283 EMERGENCY DEPT VISIT LOW MDM: CPT | Mod: 25,,, | Performed by: OBSTETRICS & GYNECOLOGY

## 2021-09-08 RX ORDER — HEPARIN 100 UNIT/ML
500 SYRINGE INTRAVENOUS
Status: DISCONTINUED | OUTPATIENT
Start: 2021-09-08 | End: 2021-09-08 | Stop reason: HOSPADM

## 2021-09-08 RX ORDER — DIPHENHYDRAMINE HYDROCHLORIDE 50 MG/ML
50 INJECTION INTRAMUSCULAR; INTRAVENOUS ONCE AS NEEDED
Status: CANCELLED | OUTPATIENT
Start: 2021-09-14

## 2021-09-08 RX ORDER — SODIUM CHLORIDE 0.9 % (FLUSH) 0.9 %
10 SYRINGE (ML) INJECTION
Status: DISCONTINUED | OUTPATIENT
Start: 2021-09-08 | End: 2021-09-08 | Stop reason: HOSPADM

## 2021-09-08 RX ORDER — ONDANSETRON 4 MG/1
4 TABLET, FILM COATED ORAL EVERY 8 HOURS PRN
Qty: 6 TABLET | Refills: 0 | Status: SHIPPED | OUTPATIENT
Start: 2021-09-08 | End: 2022-05-09

## 2021-09-08 RX ORDER — EPINEPHRINE 0.3 MG/.3ML
0.3 INJECTION SUBCUTANEOUS ONCE AS NEEDED
Status: CANCELLED | OUTPATIENT
Start: 2021-09-14

## 2021-09-08 RX ORDER — METHYLPREDNISOLONE SOD SUCC 125 MG
125 VIAL (EA) INJECTION ONCE AS NEEDED
Status: CANCELLED | OUTPATIENT
Start: 2021-09-14

## 2021-09-08 RX ORDER — SODIUM CHLORIDE 0.9 % (FLUSH) 0.9 %
10 SYRINGE (ML) INJECTION
Status: CANCELLED | OUTPATIENT
Start: 2021-09-14

## 2021-09-08 RX ORDER — HEPARIN 100 UNIT/ML
500 SYRINGE INTRAVENOUS
Status: CANCELLED | OUTPATIENT
Start: 2021-09-14

## 2021-09-08 RX ADMIN — SODIUM CHLORIDE 1000 ML: 0.9 INJECTION, SOLUTION INTRAVENOUS at 03:09

## 2021-09-08 RX ADMIN — IRON SUCROSE 200 MG: 20 INJECTION, SOLUTION INTRAVENOUS at 02:09

## 2021-09-08 RX ADMIN — SODIUM CHLORIDE: 0.9 INJECTION, SOLUTION INTRAVENOUS at 02:09

## 2021-09-10 ENCOUNTER — ROUTINE PRENATAL (OUTPATIENT)
Dept: OBSTETRICS AND GYNECOLOGY | Facility: CLINIC | Age: 36
End: 2021-09-10
Attending: OBSTETRICS & GYNECOLOGY
Payer: COMMERCIAL

## 2021-09-10 VITALS — DIASTOLIC BLOOD PRESSURE: 62 MMHG | SYSTOLIC BLOOD PRESSURE: 94 MMHG | WEIGHT: 168.88 LBS | BODY MASS INDEX: 26.45 KG/M2

## 2021-09-10 DIAGNOSIS — O41.03X0 OLIGOHYDRAMNIOS IN THIRD TRIMESTER, SINGLE OR UNSPECIFIED FETUS: Primary | ICD-10-CM

## 2021-09-10 DIAGNOSIS — Z34.90 PREGNANCY WITH ONE FETUS, ANTEPARTUM: ICD-10-CM

## 2021-09-10 DIAGNOSIS — O99.019 ANEMIA AFFECTING FOURTH PREGNANCY: ICD-10-CM

## 2021-09-10 PROCEDURE — 0502F SUBSEQUENT PRENATAL CARE: CPT | Mod: CPTII,S$GLB,, | Performed by: OBSTETRICS & GYNECOLOGY

## 2021-09-10 PROCEDURE — 0502F PR SUBSEQUENT PRENATAL CARE: ICD-10-PCS | Mod: CPTII,S$GLB,, | Performed by: OBSTETRICS & GYNECOLOGY

## 2021-09-10 PROCEDURE — 99999 PR PBB SHADOW E&M-EST. PATIENT-LVL III: ICD-10-PCS | Mod: PBBFAC,,, | Performed by: OBSTETRICS & GYNECOLOGY

## 2021-09-10 PROCEDURE — 99999 PR PBB SHADOW E&M-EST. PATIENT-LVL III: CPT | Mod: PBBFAC,,, | Performed by: OBSTETRICS & GYNECOLOGY

## 2021-09-10 RX ORDER — HYDROXYZINE PAMOATE 100 MG/1
CAPSULE ORAL
COMMUNITY
Start: 2021-09-07 | End: 2022-05-09

## 2021-09-11 LAB — BACTERIA SPEC AEROBE CULT: NORMAL

## 2021-09-13 ENCOUNTER — HOSPITAL ENCOUNTER (OUTPATIENT)
Dept: PERINATAL CARE | Facility: OTHER | Age: 36
Discharge: HOME | End: 2021-09-13
Attending: OBSTETRICS & GYNECOLOGY
Payer: COMMERCIAL

## 2021-09-13 DIAGNOSIS — O41.03X0 OLIGOHYDRAMNIOS IN THIRD TRIMESTER, SINGLE OR UNSPECIFIED FETUS: ICD-10-CM

## 2021-09-13 PROCEDURE — 76815 PR  US,PREGNANT UTERUS,LIMITED, 1/> FETUSES: ICD-10-PCS | Mod: 26,,, | Performed by: OBSTETRICS & GYNECOLOGY

## 2021-09-13 PROCEDURE — 59025 FETAL NON-STRESS TEST: CPT

## 2021-09-13 PROCEDURE — 59025 FETAL NON-STRESS TEST: CPT | Mod: 26,,, | Performed by: OBSTETRICS & GYNECOLOGY

## 2021-09-13 PROCEDURE — 76815 OB US LIMITED FETUS(S): CPT

## 2021-09-13 PROCEDURE — 76815 OB US LIMITED FETUS(S): CPT | Mod: 26,,, | Performed by: OBSTETRICS & GYNECOLOGY

## 2021-09-13 PROCEDURE — 59025 PR FETAL 2N-STRESS TEST: ICD-10-PCS | Mod: 26,,, | Performed by: OBSTETRICS & GYNECOLOGY

## 2021-09-14 ENCOUNTER — HOSPITAL ENCOUNTER (EMERGENCY)
Facility: OTHER | Age: 36
Discharge: HOME | End: 2021-09-14
Attending: OBSTETRICS & GYNECOLOGY
Payer: COMMERCIAL

## 2021-09-14 ENCOUNTER — ROUTINE PRENATAL (OUTPATIENT)
Dept: OBSTETRICS AND GYNECOLOGY | Facility: CLINIC | Age: 36
End: 2021-09-14
Attending: OBSTETRICS & GYNECOLOGY
Payer: COMMERCIAL

## 2021-09-14 VITALS
BODY MASS INDEX: 26.17 KG/M2 | SYSTOLIC BLOOD PRESSURE: 102 MMHG | WEIGHT: 167.13 LBS | DIASTOLIC BLOOD PRESSURE: 66 MMHG

## 2021-09-14 VITALS
DIASTOLIC BLOOD PRESSURE: 68 MMHG | SYSTOLIC BLOOD PRESSURE: 111 MMHG | RESPIRATION RATE: 18 BRPM | HEART RATE: 75 BPM | TEMPERATURE: 98 F | OXYGEN SATURATION: 100 %

## 2021-09-14 DIAGNOSIS — O99.019 ANEMIA AFFECTING FOURTH PREGNANCY: ICD-10-CM

## 2021-09-14 DIAGNOSIS — Z34.90 PREGNANCY WITH ONE FETUS, ANTEPARTUM: Primary | ICD-10-CM

## 2021-09-14 DIAGNOSIS — Z3A.37 37 WEEKS GESTATION OF PREGNANCY: ICD-10-CM

## 2021-09-14 DIAGNOSIS — O47.9 UTERINE CONTRACTIONS DURING PREGNANCY: Primary | ICD-10-CM

## 2021-09-14 LAB
ABO + RH BLD: NORMAL
BASOPHILS # BLD AUTO: 0.03 K/UL (ref 0–0.2)
BASOPHILS NFR BLD: 0.3 % (ref 0–1.9)
BLD GP AB SCN CELLS X3 SERPL QL: NORMAL
DIFFERENTIAL METHOD: ABNORMAL
EOSINOPHIL # BLD AUTO: 0.2 K/UL (ref 0–0.5)
EOSINOPHIL NFR BLD: 1.5 % (ref 0–8)
ERYTHROCYTE [DISTWIDTH] IN BLOOD BY AUTOMATED COUNT: 14.6 % (ref 11.5–14.5)
HCT VFR BLD AUTO: 38.2 % (ref 37–48.5)
HGB BLD-MCNC: 12.6 G/DL (ref 12–16)
HIV 1+2 AB+HIV1 P24 AG SERPL QL IA: NEGATIVE
IMM GRANULOCYTES # BLD AUTO: 0.13 K/UL (ref 0–0.04)
IMM GRANULOCYTES NFR BLD AUTO: 1.2 % (ref 0–0.5)
LYMPHOCYTES # BLD AUTO: 1.8 K/UL (ref 1–4.8)
LYMPHOCYTES NFR BLD: 16.3 % (ref 18–48)
MCH RBC QN AUTO: 29.8 PG (ref 27–31)
MCHC RBC AUTO-ENTMCNC: 33 G/DL (ref 32–36)
MCV RBC AUTO: 90 FL (ref 82–98)
MONOCYTES # BLD AUTO: 0.7 K/UL (ref 0.3–1)
MONOCYTES NFR BLD: 6.3 % (ref 4–15)
NEUTROPHILS # BLD AUTO: 8.2 K/UL (ref 1.8–7.7)
NEUTROPHILS NFR BLD: 74.4 % (ref 38–73)
NRBC BLD-RTO: 0 /100 WBC
PLATELET # BLD AUTO: 195 K/UL (ref 150–450)
PMV BLD AUTO: 9.5 FL (ref 9.2–12.9)
RBC # BLD AUTO: 4.23 M/UL (ref 4–5.4)
SARS-COV-2 RDRP RESP QL NAA+PROBE: NEGATIVE
WBC # BLD AUTO: 11.08 K/UL (ref 3.9–12.7)

## 2021-09-14 PROCEDURE — 99284 PR EMERGENCY DEPT VISIT,LEVEL IV: ICD-10-PCS | Mod: 25,,, | Performed by: OBSTETRICS & GYNECOLOGY

## 2021-09-14 PROCEDURE — U0002 COVID-19 LAB TEST NON-CDC: HCPCS | Performed by: OBSTETRICS & GYNECOLOGY

## 2021-09-14 PROCEDURE — 59025 FETAL NON-STRESS TEST: CPT | Mod: 26,,, | Performed by: OBSTETRICS & GYNECOLOGY

## 2021-09-14 PROCEDURE — 99999 PR PBB SHADOW E&M-EST. PATIENT-LVL III: ICD-10-PCS | Mod: PBBFAC,,, | Performed by: OBSTETRICS & GYNECOLOGY

## 2021-09-14 PROCEDURE — 86900 BLOOD TYPING SEROLOGIC ABO: CPT | Performed by: OBSTETRICS & GYNECOLOGY

## 2021-09-14 PROCEDURE — 99999 PR PBB SHADOW E&M-EST. PATIENT-LVL III: CPT | Mod: PBBFAC,,, | Performed by: OBSTETRICS & GYNECOLOGY

## 2021-09-14 PROCEDURE — 85025 COMPLETE CBC W/AUTO DIFF WBC: CPT | Performed by: OBSTETRICS & GYNECOLOGY

## 2021-09-14 PROCEDURE — 0502F SUBSEQUENT PRENATAL CARE: CPT | Mod: CPTII,S$GLB,, | Performed by: OBSTETRICS & GYNECOLOGY

## 2021-09-14 PROCEDURE — 0502F PR SUBSEQUENT PRENATAL CARE: ICD-10-PCS | Mod: CPTII,S$GLB,, | Performed by: OBSTETRICS & GYNECOLOGY

## 2021-09-14 PROCEDURE — 87389 HIV-1 AG W/HIV-1&-2 AB AG IA: CPT | Performed by: OBSTETRICS & GYNECOLOGY

## 2021-09-14 PROCEDURE — 59025 FETAL NON-STRESS TEST: CPT

## 2021-09-14 PROCEDURE — 99284 EMERGENCY DEPT VISIT MOD MDM: CPT | Mod: 25,,, | Performed by: OBSTETRICS & GYNECOLOGY

## 2021-09-14 PROCEDURE — 59025 PR FETAL 2N-STRESS TEST: ICD-10-PCS | Mod: 26,,, | Performed by: OBSTETRICS & GYNECOLOGY

## 2021-09-14 PROCEDURE — 86592 SYPHILIS TEST NON-TREP QUAL: CPT | Performed by: OBSTETRICS & GYNECOLOGY

## 2021-09-14 PROCEDURE — 99284 EMERGENCY DEPT VISIT MOD MDM: CPT | Mod: 25

## 2021-09-14 PROCEDURE — 96372 THER/PROPH/DIAG INJ SC/IM: CPT | Mod: 59

## 2021-09-14 PROCEDURE — 63600175 PHARM REV CODE 636 W HCPCS: Performed by: OBSTETRICS & GYNECOLOGY

## 2021-09-14 RX ORDER — BUTORPHANOL TARTRATE 1 MG/ML
1 INJECTION INTRAMUSCULAR; INTRAVENOUS ONCE
Status: COMPLETED | OUTPATIENT
Start: 2021-09-14 | End: 2021-09-14

## 2021-09-14 RX ADMIN — BUTORPHANOL TARTRATE 1 MG: 1 INJECTION, SOLUTION INTRAMUSCULAR; INTRAVENOUS at 07:09

## 2021-09-15 ENCOUNTER — ROUTINE PRENATAL (OUTPATIENT)
Dept: OBSTETRICS AND GYNECOLOGY | Facility: CLINIC | Age: 36
End: 2021-09-15
Attending: OBSTETRICS & GYNECOLOGY
Payer: COMMERCIAL

## 2021-09-15 VITALS — DIASTOLIC BLOOD PRESSURE: 62 MMHG | SYSTOLIC BLOOD PRESSURE: 92 MMHG | BODY MASS INDEX: 26.17 KG/M2 | WEIGHT: 167.13 LBS

## 2021-09-15 DIAGNOSIS — O99.019 ANEMIA AFFECTING FOURTH PREGNANCY: Primary | ICD-10-CM

## 2021-09-15 DIAGNOSIS — Z34.90 PREGNANCY WITH ONE FETUS, ANTEPARTUM: ICD-10-CM

## 2021-09-15 LAB — RPR SER QL: NORMAL

## 2021-09-15 PROCEDURE — 99999 PR PBB SHADOW E&M-EST. PATIENT-LVL III: ICD-10-PCS | Mod: PBBFAC,,, | Performed by: OBSTETRICS & GYNECOLOGY

## 2021-09-15 PROCEDURE — 0502F PR SUBSEQUENT PRENATAL CARE: ICD-10-PCS | Mod: CPTII,S$GLB,, | Performed by: OBSTETRICS & GYNECOLOGY

## 2021-09-15 PROCEDURE — 0502F SUBSEQUENT PRENATAL CARE: CPT | Mod: CPTII,S$GLB,, | Performed by: OBSTETRICS & GYNECOLOGY

## 2021-09-15 PROCEDURE — 99999 PR PBB SHADOW E&M-EST. PATIENT-LVL III: CPT | Mod: PBBFAC,,, | Performed by: OBSTETRICS & GYNECOLOGY

## 2021-09-21 ENCOUNTER — ANESTHESIA (OUTPATIENT)
Dept: OBSTETRICS AND GYNECOLOGY | Facility: OTHER | Age: 36
End: 2021-09-21
Payer: COMMERCIAL

## 2021-09-21 ENCOUNTER — ANESTHESIA EVENT (OUTPATIENT)
Dept: OBSTETRICS AND GYNECOLOGY | Facility: OTHER | Age: 36
End: 2021-09-21
Payer: COMMERCIAL

## 2021-09-21 ENCOUNTER — ROUTINE PRENATAL (OUTPATIENT)
Dept: OBSTETRICS AND GYNECOLOGY | Facility: CLINIC | Age: 36
End: 2021-09-21
Attending: OBSTETRICS & GYNECOLOGY
Payer: COMMERCIAL

## 2021-09-21 ENCOUNTER — HOSPITAL ENCOUNTER (INPATIENT)
Facility: OTHER | Age: 36
LOS: 2 days | Discharge: HOME | End: 2021-09-23
Attending: STUDENT IN AN ORGANIZED HEALTH CARE EDUCATION/TRAINING PROGRAM | Admitting: STUDENT IN AN ORGANIZED HEALTH CARE EDUCATION/TRAINING PROGRAM
Payer: COMMERCIAL

## 2021-09-21 VITALS
DIASTOLIC BLOOD PRESSURE: 74 MMHG | BODY MASS INDEX: 26.17 KG/M2 | WEIGHT: 167.13 LBS | SYSTOLIC BLOOD PRESSURE: 108 MMHG

## 2021-09-21 DIAGNOSIS — Z37.9 NORMAL LABOR: ICD-10-CM

## 2021-09-21 DIAGNOSIS — Z34.90 TERM PREGNANCY: Primary | ICD-10-CM

## 2021-09-21 DIAGNOSIS — O47.9 UTERINE CONTRACTIONS DURING PREGNANCY: ICD-10-CM

## 2021-09-21 DIAGNOSIS — Z3A.38 38 WEEKS GESTATION OF PREGNANCY: ICD-10-CM

## 2021-09-21 LAB
ABO + RH BLD: NORMAL
BASOPHILS # BLD AUTO: 0.02 K/UL (ref 0–0.2)
BASOPHILS NFR BLD: 0.2 % (ref 0–1.9)
BLD GP AB SCN CELLS X3 SERPL QL: NORMAL
DIFFERENTIAL METHOD: ABNORMAL
EOSINOPHIL # BLD AUTO: 0 K/UL (ref 0–0.5)
EOSINOPHIL NFR BLD: 0.2 % (ref 0–8)
ERYTHROCYTE [DISTWIDTH] IN BLOOD BY AUTOMATED COUNT: 14.1 % (ref 11.5–14.5)
HCT VFR BLD AUTO: 37.2 % (ref 37–48.5)
HGB BLD-MCNC: 12.8 G/DL (ref 12–16)
IMM GRANULOCYTES # BLD AUTO: 0.25 K/UL (ref 0–0.04)
IMM GRANULOCYTES NFR BLD AUTO: 2 % (ref 0–0.5)
LYMPHOCYTES # BLD AUTO: 1.7 K/UL (ref 1–4.8)
LYMPHOCYTES NFR BLD: 13.6 % (ref 18–48)
MCH RBC QN AUTO: 30.5 PG (ref 27–31)
MCHC RBC AUTO-ENTMCNC: 34.4 G/DL (ref 32–36)
MCV RBC AUTO: 89 FL (ref 82–98)
MONOCYTES # BLD AUTO: 0.9 K/UL (ref 0.3–1)
MONOCYTES NFR BLD: 7.6 % (ref 4–15)
NEUTROPHILS # BLD AUTO: 9.5 K/UL (ref 1.8–7.7)
NEUTROPHILS NFR BLD: 76.4 % (ref 38–73)
NRBC BLD-RTO: 0 /100 WBC
PLATELET # BLD AUTO: 202 K/UL (ref 150–450)
PMV BLD AUTO: 9.8 FL (ref 9.2–12.9)
RBC # BLD AUTO: 4.19 M/UL (ref 4–5.4)
SARS-COV-2 RDRP RESP QL NAA+PROBE: NEGATIVE
WBC # BLD AUTO: 12.43 K/UL (ref 3.9–12.7)

## 2021-09-21 PROCEDURE — 86900 BLOOD TYPING SEROLOGIC ABO: CPT

## 2021-09-21 PROCEDURE — 27200710 HC EPIDURAL INFUSION PUMP SET: Performed by: ANESTHESIOLOGY

## 2021-09-21 PROCEDURE — 25000003 PHARM REV CODE 250: Performed by: ANESTHESIOLOGY

## 2021-09-21 PROCEDURE — 85025 COMPLETE CBC W/AUTO DIFF WBC: CPT

## 2021-09-21 PROCEDURE — 99999 PR PBB SHADOW E&M-EST. PATIENT-LVL III: ICD-10-PCS | Mod: PBBFAC,,, | Performed by: OBSTETRICS & GYNECOLOGY

## 2021-09-21 PROCEDURE — 99999 PR PBB SHADOW E&M-EST. PATIENT-LVL III: CPT | Mod: PBBFAC,,, | Performed by: OBSTETRICS & GYNECOLOGY

## 2021-09-21 PROCEDURE — 62326 NJX INTERLAMINAR LMBR/SAC: CPT | Performed by: ANESTHESIOLOGY

## 2021-09-21 PROCEDURE — 11000001 HC ACUTE MED/SURG PRIVATE ROOM

## 2021-09-21 PROCEDURE — 63600175 PHARM REV CODE 636 W HCPCS: Performed by: STUDENT IN AN ORGANIZED HEALTH CARE EDUCATION/TRAINING PROGRAM

## 2021-09-21 PROCEDURE — 0502F PR SUBSEQUENT PRENATAL CARE: ICD-10-PCS | Mod: CPTII,S$GLB,, | Performed by: OBSTETRICS & GYNECOLOGY

## 2021-09-21 PROCEDURE — 99285 EMERGENCY DEPT VISIT HI MDM: CPT | Mod: 25

## 2021-09-21 PROCEDURE — C1751 CATH, INF, PER/CENT/MIDLINE: HCPCS | Performed by: ANESTHESIOLOGY

## 2021-09-21 PROCEDURE — 59025 FETAL NON-STRESS TEST: CPT

## 2021-09-21 PROCEDURE — U0002 COVID-19 LAB TEST NON-CDC: HCPCS

## 2021-09-21 PROCEDURE — 59409 PRA ETRICAL CARE,VAG DELIV ONLY: ICD-10-PCS | Mod: QY,,, | Performed by: ANESTHESIOLOGY

## 2021-09-21 PROCEDURE — 59409 OBSTETRICAL CARE: CPT | Mod: QY,,, | Performed by: ANESTHESIOLOGY

## 2021-09-21 PROCEDURE — 0502F SUBSEQUENT PRENATAL CARE: CPT | Mod: CPTII,S$GLB,, | Performed by: OBSTETRICS & GYNECOLOGY

## 2021-09-21 RX ORDER — TRANEXAMIC ACID 100 MG/ML
1000 INJECTION, SOLUTION INTRAVENOUS ONCE AS NEEDED
Status: DISCONTINUED | OUTPATIENT
Start: 2021-09-21 | End: 2021-09-22

## 2021-09-21 RX ORDER — ONDANSETRON 8 MG/1
8 TABLET, ORALLY DISINTEGRATING ORAL EVERY 8 HOURS PRN
Status: DISCONTINUED | OUTPATIENT
Start: 2021-09-21 | End: 2021-09-22

## 2021-09-21 RX ORDER — LIDOCAINE HYDROCHLORIDE AND EPINEPHRINE 15; 5 MG/ML; UG/ML
INJECTION, SOLUTION EPIDURAL
Status: DISCONTINUED | OUTPATIENT
Start: 2021-09-21 | End: 2021-09-22

## 2021-09-21 RX ORDER — SODIUM CITRATE AND CITRIC ACID MONOHYDRATE 334; 500 MG/5ML; MG/5ML
30 SOLUTION ORAL ONCE
Status: DISCONTINUED | OUTPATIENT
Start: 2021-09-21 | End: 2021-09-22

## 2021-09-21 RX ORDER — SODIUM CHLORIDE, SODIUM LACTATE, POTASSIUM CHLORIDE, CALCIUM CHLORIDE 600; 310; 30; 20 MG/100ML; MG/100ML; MG/100ML; MG/100ML
INJECTION, SOLUTION INTRAVENOUS CONTINUOUS
Status: DISCONTINUED | OUTPATIENT
Start: 2021-09-21 | End: 2021-09-22

## 2021-09-21 RX ORDER — SODIUM CHLORIDE 9 MG/ML
INJECTION, SOLUTION INTRAVENOUS
Status: DISCONTINUED | OUTPATIENT
Start: 2021-09-21 | End: 2021-09-22

## 2021-09-21 RX ORDER — FENTANYL CITRATE 50 UG/ML
INJECTION, SOLUTION INTRAMUSCULAR; INTRAVENOUS
Status: DISCONTINUED | OUTPATIENT
Start: 2021-09-21 | End: 2021-09-22

## 2021-09-21 RX ORDER — CEFAZOLIN SODIUM 2 G/50ML
2 SOLUTION INTRAVENOUS ONCE AS NEEDED
Status: DISCONTINUED | OUTPATIENT
Start: 2021-09-21 | End: 2021-09-22

## 2021-09-21 RX ORDER — CALCIUM CARBONATE 200(500)MG
500 TABLET,CHEWABLE ORAL 3 TIMES DAILY PRN
Status: DISCONTINUED | OUTPATIENT
Start: 2021-09-21 | End: 2021-09-22

## 2021-09-21 RX ORDER — DIPHENOXYLATE HYDROCHLORIDE AND ATROPINE SULFATE 2.5; .025 MG/1; MG/1
1 TABLET ORAL 4 TIMES DAILY PRN
Status: DISCONTINUED | OUTPATIENT
Start: 2021-09-21 | End: 2021-09-22

## 2021-09-21 RX ORDER — OXYTOCIN/RINGER'S LACTATE 30/500 ML
0-30 PLASTIC BAG, INJECTION (ML) INTRAVENOUS CONTINUOUS
Status: DISCONTINUED | OUTPATIENT
Start: 2021-09-21 | End: 2021-09-22

## 2021-09-21 RX ORDER — MISOPROSTOL 200 UG/1
800 TABLET ORAL
Status: DISCONTINUED | OUTPATIENT
Start: 2021-09-21 | End: 2021-09-22

## 2021-09-21 RX ORDER — PROCHLORPERAZINE EDISYLATE 5 MG/ML
5 INJECTION INTRAMUSCULAR; INTRAVENOUS EVERY 6 HOURS PRN
Status: DISCONTINUED | OUTPATIENT
Start: 2021-09-21 | End: 2021-09-22

## 2021-09-21 RX ORDER — FENTANYL/BUPIVACAINE/NS/PF 2MCG/ML-.1
PLASTIC BAG, INJECTION (ML) INJECTION CONTINUOUS
Status: DISCONTINUED | OUTPATIENT
Start: 2021-09-21 | End: 2021-09-22

## 2021-09-21 RX ORDER — BUPIVACAINE HYDROCHLORIDE 2.5 MG/ML
INJECTION, SOLUTION EPIDURAL; INFILTRATION; INTRACAUDAL
Status: DISPENSED
Start: 2021-09-21 | End: 2021-09-22

## 2021-09-21 RX ORDER — FENTANYL/BUPIVACAINE/NS/PF 2MCG/ML-.1
PLASTIC BAG, INJECTION (ML) INJECTION CONTINUOUS PRN
Status: DISCONTINUED | OUTPATIENT
Start: 2021-09-21 | End: 2021-09-22

## 2021-09-21 RX ORDER — FAMOTIDINE 10 MG/ML
20 INJECTION INTRAVENOUS ONCE
Status: COMPLETED | OUTPATIENT
Start: 2021-09-21 | End: 2021-09-21

## 2021-09-21 RX ORDER — METHYLERGONOVINE MALEATE 0.2 MG/ML
200 INJECTION INTRAVENOUS
Status: DISCONTINUED | OUTPATIENT
Start: 2021-09-21 | End: 2021-09-22

## 2021-09-21 RX ORDER — FENTANYL/BUPIVACAINE/NS/PF 2MCG/ML-.1
PLASTIC BAG, INJECTION (ML) INJECTION
Status: COMPLETED
Start: 2021-09-21 | End: 2021-09-21

## 2021-09-21 RX ORDER — CARBOPROST TROMETHAMINE 250 UG/ML
250 INJECTION, SOLUTION INTRAMUSCULAR
Status: DISCONTINUED | OUTPATIENT
Start: 2021-09-21 | End: 2021-09-22

## 2021-09-21 RX ORDER — OXYTOCIN/RINGER'S LACTATE 30/500 ML
95 PLASTIC BAG, INJECTION (ML) INTRAVENOUS ONCE
Status: DISCONTINUED | OUTPATIENT
Start: 2021-09-21 | End: 2021-09-22

## 2021-09-21 RX ORDER — SIMETHICONE 80 MG
1 TABLET,CHEWABLE ORAL 4 TIMES DAILY PRN
Status: DISCONTINUED | OUTPATIENT
Start: 2021-09-21 | End: 2021-09-22

## 2021-09-21 RX ORDER — OXYTOCIN/RINGER'S LACTATE 30/500 ML
334 PLASTIC BAG, INJECTION (ML) INTRAVENOUS ONCE
Status: DISCONTINUED | OUTPATIENT
Start: 2021-09-21 | End: 2021-09-22

## 2021-09-21 RX ORDER — FENTANYL CITRATE 50 UG/ML
INJECTION, SOLUTION INTRAMUSCULAR; INTRAVENOUS
Status: COMPLETED
Start: 2021-09-21 | End: 2021-09-21

## 2021-09-21 RX ADMIN — Medication 10 ML: at 05:09

## 2021-09-21 RX ADMIN — Medication 10 ML/HR: at 05:09

## 2021-09-21 RX ADMIN — FENTANYL CITRATE 100 MCG: 50 INJECTION, SOLUTION INTRAMUSCULAR; INTRAVENOUS at 05:09

## 2021-09-21 RX ADMIN — Medication 2 MILLI-UNITS/MIN: at 08:09

## 2021-09-21 RX ADMIN — FAMOTIDINE 20 MG: 10 INJECTION INTRAVENOUS at 09:09

## 2021-09-21 RX ADMIN — LIDOCAINE HYDROCHLORIDE,EPINEPHRINE BITARTRATE 3 ML: 15; .005 INJECTION, SOLUTION EPIDURAL; INFILTRATION; INTRACAUDAL; PERINEURAL at 05:09

## 2021-09-22 PROCEDURE — 59400 OBSTETRICAL CARE: CPT | Mod: ,,, | Performed by: STUDENT IN AN ORGANIZED HEALTH CARE EDUCATION/TRAINING PROGRAM

## 2021-09-22 PROCEDURE — 25000003 PHARM REV CODE 250: Performed by: ANESTHESIOLOGY

## 2021-09-22 PROCEDURE — 25000003 PHARM REV CODE 250

## 2021-09-22 PROCEDURE — 72100002 HC LABOR CARE, 1ST 8 HOURS

## 2021-09-22 PROCEDURE — 11000001 HC ACUTE MED/SURG PRIVATE ROOM

## 2021-09-22 PROCEDURE — 63600175 PHARM REV CODE 636 W HCPCS: Performed by: ANESTHESIOLOGY

## 2021-09-22 PROCEDURE — 51702 INSERT TEMP BLADDER CATH: CPT

## 2021-09-22 PROCEDURE — 25000003 PHARM REV CODE 250: Performed by: STUDENT IN AN ORGANIZED HEALTH CARE EDUCATION/TRAINING PROGRAM

## 2021-09-22 PROCEDURE — 72200005 HC VAGINAL DELIVERY LEVEL II

## 2021-09-22 PROCEDURE — 63600175 PHARM REV CODE 636 W HCPCS: Performed by: STUDENT IN AN ORGANIZED HEALTH CARE EDUCATION/TRAINING PROGRAM

## 2021-09-22 PROCEDURE — 72100003 HC LABOR CARE, EA. ADDL. 8 HRS

## 2021-09-22 PROCEDURE — 59400 PR FULL ROUT OBSTE CARE,VAGINAL DELIV: ICD-10-PCS | Mod: ,,, | Performed by: STUDENT IN AN ORGANIZED HEALTH CARE EDUCATION/TRAINING PROGRAM

## 2021-09-22 RX ORDER — MISOPROSTOL 200 UG/1
TABLET ORAL
Status: DISCONTINUED
Start: 2021-09-22 | End: 2021-09-22 | Stop reason: WASHOUT

## 2021-09-22 RX ORDER — DOCUSATE SODIUM 100 MG/1
200 CAPSULE, LIQUID FILLED ORAL 2 TIMES DAILY PRN
Status: DISCONTINUED | OUTPATIENT
Start: 2021-09-22 | End: 2021-09-23 | Stop reason: HOSPADM

## 2021-09-22 RX ORDER — SIMETHICONE 80 MG
1 TABLET,CHEWABLE ORAL EVERY 6 HOURS PRN
Status: DISCONTINUED | OUTPATIENT
Start: 2021-09-22 | End: 2021-09-23 | Stop reason: HOSPADM

## 2021-09-22 RX ORDER — PRENATAL WITH FERROUS FUM AND FOLIC ACID 3080; 920; 120; 400; 22; 1.84; 3; 20; 10; 1; 12; 200; 27; 25; 2 [IU]/1; [IU]/1; MG/1; [IU]/1; MG/1; MG/1; MG/1; MG/1; MG/1; MG/1; UG/1; MG/1; MG/1; MG/1; MG/1
1 TABLET ORAL DAILY
Status: DISCONTINUED | OUTPATIENT
Start: 2021-09-22 | End: 2021-09-23 | Stop reason: HOSPADM

## 2021-09-22 RX ORDER — HYDROCORTISONE 25 MG/G
CREAM TOPICAL 3 TIMES DAILY PRN
Status: DISCONTINUED | OUTPATIENT
Start: 2021-09-22 | End: 2021-09-23 | Stop reason: HOSPADM

## 2021-09-22 RX ORDER — SODIUM CHLORIDE 0.9 % (FLUSH) 0.9 %
10 SYRINGE (ML) INJECTION
Status: DISCONTINUED | OUTPATIENT
Start: 2021-09-22 | End: 2021-09-23 | Stop reason: HOSPADM

## 2021-09-22 RX ORDER — ONDANSETRON 8 MG/1
8 TABLET, ORALLY DISINTEGRATING ORAL EVERY 8 HOURS PRN
Status: DISCONTINUED | OUTPATIENT
Start: 2021-09-22 | End: 2021-09-23 | Stop reason: HOSPADM

## 2021-09-22 RX ORDER — HYDROCODONE BITARTRATE AND ACETAMINOPHEN 10; 325 MG/1; MG/1
1 TABLET ORAL EVERY 4 HOURS PRN
Status: DISCONTINUED | OUTPATIENT
Start: 2021-09-22 | End: 2021-09-22

## 2021-09-22 RX ORDER — BUPIVACAINE HYDROCHLORIDE 2.5 MG/ML
INJECTION, SOLUTION EPIDURAL; INFILTRATION; INTRACAUDAL
Status: DISPENSED
Start: 2021-09-22 | End: 2021-09-22

## 2021-09-22 RX ORDER — DIPHENHYDRAMINE HYDROCHLORIDE 50 MG/ML
25 INJECTION INTRAMUSCULAR; INTRAVENOUS EVERY 4 HOURS PRN
Status: DISCONTINUED | OUTPATIENT
Start: 2021-09-22 | End: 2021-09-23 | Stop reason: HOSPADM

## 2021-09-22 RX ORDER — OXYTOCIN/RINGER'S LACTATE 30/500 ML
95 PLASTIC BAG, INJECTION (ML) INTRAVENOUS ONCE
Status: COMPLETED | OUTPATIENT
Start: 2021-09-22 | End: 2021-09-22

## 2021-09-22 RX ORDER — METHYLERGONOVINE MALEATE 0.2 MG/ML
INJECTION INTRAVENOUS
Status: DISCONTINUED
Start: 2021-09-22 | End: 2021-09-22 | Stop reason: WASHOUT

## 2021-09-22 RX ORDER — DIPHENHYDRAMINE HCL 25 MG
25 CAPSULE ORAL EVERY 4 HOURS PRN
Status: DISCONTINUED | OUTPATIENT
Start: 2021-09-22 | End: 2021-09-23 | Stop reason: HOSPADM

## 2021-09-22 RX ORDER — OXYCODONE AND ACETAMINOPHEN 5; 325 MG/1; MG/1
1 TABLET ORAL EVERY 4 HOURS PRN
Status: DISCONTINUED | OUTPATIENT
Start: 2021-09-22 | End: 2021-09-23 | Stop reason: HOSPADM

## 2021-09-22 RX ORDER — FENTANYL CITRATE 50 UG/ML
INJECTION, SOLUTION INTRAMUSCULAR; INTRAVENOUS
Status: COMPLETED
Start: 2021-09-22 | End: 2021-09-22

## 2021-09-22 RX ORDER — IBUPROFEN 600 MG/1
600 TABLET ORAL EVERY 6 HOURS
Status: DISCONTINUED | OUTPATIENT
Start: 2021-09-22 | End: 2021-09-23 | Stop reason: HOSPADM

## 2021-09-22 RX ORDER — HYDROCODONE BITARTRATE AND ACETAMINOPHEN 5; 325 MG/1; MG/1
1 TABLET ORAL EVERY 4 HOURS PRN
Status: DISCONTINUED | OUTPATIENT
Start: 2021-09-22 | End: 2021-09-22

## 2021-09-22 RX ORDER — OXYCODONE AND ACETAMINOPHEN 10; 325 MG/1; MG/1
1 TABLET ORAL EVERY 4 HOURS PRN
Status: DISCONTINUED | OUTPATIENT
Start: 2021-09-22 | End: 2021-09-23 | Stop reason: HOSPADM

## 2021-09-22 RX ORDER — ACETAMINOPHEN 325 MG/1
650 TABLET ORAL EVERY 6 HOURS PRN
Status: DISCONTINUED | OUTPATIENT
Start: 2021-09-22 | End: 2021-09-23 | Stop reason: HOSPADM

## 2021-09-22 RX ORDER — PROCHLORPERAZINE EDISYLATE 5 MG/ML
5 INJECTION INTRAMUSCULAR; INTRAVENOUS EVERY 6 HOURS PRN
Status: DISCONTINUED | OUTPATIENT
Start: 2021-09-22 | End: 2021-09-23 | Stop reason: HOSPADM

## 2021-09-22 RX ADMIN — IBUPROFEN 600 MG: 600 TABLET ORAL at 06:09

## 2021-09-22 RX ADMIN — IBUPROFEN 600 MG: 600 TABLET ORAL at 05:09

## 2021-09-22 RX ADMIN — FENTANYL CITRATE 100 MCG: 50 INJECTION, SOLUTION INTRAMUSCULAR; INTRAVENOUS at 12:09

## 2021-09-22 RX ADMIN — Medication 8 ML: at 12:09

## 2021-09-22 RX ADMIN — DOCUSATE SODIUM 200 MG: 100 CAPSULE, LIQUID FILLED ORAL at 11:09

## 2021-09-22 RX ADMIN — IBUPROFEN 600 MG: 600 TABLET ORAL at 11:09

## 2021-09-22 RX ADMIN — OXYCODONE HYDROCHLORIDE AND ACETAMINOPHEN 1 TABLET: 5; 325 TABLET ORAL at 09:09

## 2021-09-22 RX ADMIN — Medication 95 MILLI-UNITS/MIN: at 04:09

## 2021-09-22 RX ADMIN — HYDROCODONE BITARTRATE AND ACETAMINOPHEN 1 TABLET: 5; 325 TABLET ORAL at 03:09

## 2021-09-22 RX ADMIN — SODIUM CHLORIDE, SODIUM LACTATE, POTASSIUM CHLORIDE, AND CALCIUM CHLORIDE 500 ML: .6; .31; .03; .02 INJECTION, SOLUTION INTRAVENOUS at 02:09

## 2021-09-22 RX ADMIN — DOCUSATE SODIUM 200 MG: 100 CAPSULE, LIQUID FILLED ORAL at 10:09

## 2021-09-23 ENCOUNTER — TELEPHONE (OUTPATIENT)
Dept: OBSTETRICS AND GYNECOLOGY | Facility: CLINIC | Age: 36
End: 2021-09-23

## 2021-09-23 VITALS
HEIGHT: 67 IN | TEMPERATURE: 98 F | SYSTOLIC BLOOD PRESSURE: 103 MMHG | BODY MASS INDEX: 26.23 KG/M2 | DIASTOLIC BLOOD PRESSURE: 68 MMHG | OXYGEN SATURATION: 98 % | HEART RATE: 59 BPM | WEIGHT: 167.13 LBS | RESPIRATION RATE: 18 BRPM

## 2021-09-23 PROCEDURE — 25000003 PHARM REV CODE 250

## 2021-09-23 PROCEDURE — 25000003 PHARM REV CODE 250: Performed by: STUDENT IN AN ORGANIZED HEALTH CARE EDUCATION/TRAINING PROGRAM

## 2021-09-23 RX ORDER — IBUPROFEN 600 MG/1
600 TABLET ORAL 3 TIMES DAILY
Qty: 60 TABLET | Refills: 0 | Status: SHIPPED | OUTPATIENT
Start: 2021-09-23

## 2021-09-23 RX ORDER — IBUPROFEN 600 MG/1
600 TABLET ORAL EVERY 6 HOURS PRN
Qty: 30 TABLET | Refills: 1 | Status: SHIPPED | OUTPATIENT
Start: 2021-09-23 | End: 2021-09-23 | Stop reason: SDUPTHER

## 2021-09-23 RX ORDER — HYDROCODONE BITARTRATE AND ACETAMINOPHEN 5; 325 MG/1; MG/1
1 TABLET ORAL EVERY 6 HOURS PRN
Qty: 15 TABLET | Refills: 0 | Status: SHIPPED | OUTPATIENT
Start: 2021-09-23 | End: 2021-09-23 | Stop reason: SDUPTHER

## 2021-09-23 RX ORDER — DOCUSATE SODIUM 100 MG/1
100 CAPSULE, LIQUID FILLED ORAL 2 TIMES DAILY
Qty: 60 CAPSULE | Refills: 0 | Status: SHIPPED | OUTPATIENT
Start: 2021-09-23 | End: 2022-05-09

## 2021-09-23 RX ORDER — OXYCODONE AND ACETAMINOPHEN 5; 325 MG/1; MG/1
1 TABLET ORAL EVERY 6 HOURS PRN
Qty: 10 TABLET | Refills: 0 | Status: SHIPPED | OUTPATIENT
Start: 2021-09-23 | End: 2021-09-27 | Stop reason: SDUPTHER

## 2021-09-23 RX ADMIN — IBUPROFEN 600 MG: 600 TABLET ORAL at 06:09

## 2021-09-23 RX ADMIN — OXYCODONE HYDROCHLORIDE AND ACETAMINOPHEN 1 TABLET: 5; 325 TABLET ORAL at 02:09

## 2021-09-23 RX ADMIN — DOCUSATE SODIUM 200 MG: 100 CAPSULE, LIQUID FILLED ORAL at 08:09

## 2021-09-23 RX ADMIN — OXYCODONE HYDROCHLORIDE AND ACETAMINOPHEN 1 TABLET: 5; 325 TABLET ORAL at 09:09

## 2021-09-27 ENCOUNTER — PATIENT MESSAGE (OUTPATIENT)
Dept: OBSTETRICS AND GYNECOLOGY | Facility: CLINIC | Age: 36
End: 2021-09-27

## 2021-09-27 RX ORDER — OXYCODONE AND ACETAMINOPHEN 5; 325 MG/1; MG/1
1 TABLET ORAL EVERY 6 HOURS PRN
Qty: 20 TABLET | Refills: 0 | Status: SHIPPED | OUTPATIENT
Start: 2021-09-27 | End: 2022-05-09

## 2021-09-29 ENCOUNTER — PATIENT MESSAGE (OUTPATIENT)
Dept: OBSTETRICS AND GYNECOLOGY | Facility: CLINIC | Age: 36
End: 2021-09-29

## 2021-09-29 RX ORDER — DICLOXACILLIN SODIUM 500 MG/1
500 CAPSULE ORAL EVERY 6 HOURS
Qty: 40 CAPSULE | Refills: 0 | Status: SHIPPED | OUTPATIENT
Start: 2021-09-29 | End: 2021-10-09

## 2021-10-04 ENCOUNTER — PATIENT MESSAGE (OUTPATIENT)
Dept: HEMATOLOGY/ONCOLOGY | Facility: CLINIC | Age: 36
End: 2021-10-04

## 2021-10-04 ENCOUNTER — PATIENT MESSAGE (OUTPATIENT)
Dept: OBSTETRICS AND GYNECOLOGY | Facility: CLINIC | Age: 36
End: 2021-10-04

## 2021-10-04 ENCOUNTER — LAB VISIT (OUTPATIENT)
Dept: LAB | Facility: OTHER | Age: 36
End: 2021-10-04
Attending: OBSTETRICS & GYNECOLOGY
Payer: COMMERCIAL

## 2021-10-04 DIAGNOSIS — R42 LIGHT HEADED: ICD-10-CM

## 2021-10-04 DIAGNOSIS — D64.9 ANEMIA, UNSPECIFIED TYPE: Primary | ICD-10-CM

## 2021-10-04 DIAGNOSIS — D64.9 ANEMIA, UNSPECIFIED TYPE: ICD-10-CM

## 2021-10-04 DIAGNOSIS — R53.1 WEAK: ICD-10-CM

## 2021-10-04 LAB
BASOPHILS # BLD AUTO: 0.05 K/UL (ref 0–0.2)
BASOPHILS NFR BLD: 0.7 % (ref 0–1.9)
DIFFERENTIAL METHOD: NORMAL
EOSINOPHIL # BLD AUTO: 0.3 K/UL (ref 0–0.5)
EOSINOPHIL NFR BLD: 3.5 % (ref 0–8)
ERYTHROCYTE [DISTWIDTH] IN BLOOD BY AUTOMATED COUNT: 13.6 % (ref 11.5–14.5)
FERRITIN SERPL-MCNC: 112 NG/ML (ref 20–300)
HCT VFR BLD AUTO: 47.5 % (ref 37–48.5)
HGB BLD-MCNC: 15.3 G/DL (ref 12–16)
IMM GRANULOCYTES # BLD AUTO: 0.02 K/UL (ref 0–0.04)
IMM GRANULOCYTES NFR BLD AUTO: 0.3 % (ref 0–0.5)
IRON SERPL-MCNC: 138 UG/DL (ref 30–160)
IRON SERPL-MCNC: 138 UG/DL (ref 30–160)
LYMPHOCYTES # BLD AUTO: 2.7 K/UL (ref 1–4.8)
LYMPHOCYTES NFR BLD: 35.2 % (ref 18–48)
MCH RBC QN AUTO: 29.8 PG (ref 27–31)
MCHC RBC AUTO-ENTMCNC: 32.2 G/DL (ref 32–36)
MCV RBC AUTO: 92 FL (ref 82–98)
MONOCYTES # BLD AUTO: 0.4 K/UL (ref 0.3–1)
MONOCYTES NFR BLD: 5.3 % (ref 4–15)
NEUTROPHILS # BLD AUTO: 4.1 K/UL (ref 1.8–7.7)
NEUTROPHILS NFR BLD: 55 % (ref 38–73)
NRBC BLD-RTO: 0 /100 WBC
PLATELET # BLD AUTO: 281 K/UL (ref 150–450)
PMV BLD AUTO: 9.3 FL (ref 9.2–12.9)
RBC # BLD AUTO: 5.14 M/UL (ref 4–5.4)
SATURATED IRON: 36 % (ref 20–50)
TOTAL IRON BINDING CAPACITY: 388 UG/DL (ref 250–450)
TRANSFERRIN SERPL-MCNC: 262 MG/DL (ref 200–375)
WBC # BLD AUTO: 7.52 K/UL (ref 3.9–12.7)

## 2021-10-04 PROCEDURE — 82728 ASSAY OF FERRITIN: CPT | Performed by: OBSTETRICS & GYNECOLOGY

## 2021-10-04 PROCEDURE — 36415 COLL VENOUS BLD VENIPUNCTURE: CPT | Performed by: OBSTETRICS & GYNECOLOGY

## 2021-10-04 PROCEDURE — 85025 COMPLETE CBC W/AUTO DIFF WBC: CPT | Performed by: OBSTETRICS & GYNECOLOGY

## 2021-10-04 PROCEDURE — 84466 ASSAY OF TRANSFERRIN: CPT | Performed by: OBSTETRICS & GYNECOLOGY

## 2021-10-05 ENCOUNTER — PATIENT MESSAGE (OUTPATIENT)
Dept: OBSTETRICS AND GYNECOLOGY | Facility: CLINIC | Age: 36
End: 2021-10-05

## 2021-10-05 ENCOUNTER — INFUSION (OUTPATIENT)
Dept: INFUSION THERAPY | Facility: OTHER | Age: 36
End: 2021-10-05
Attending: STUDENT IN AN ORGANIZED HEALTH CARE EDUCATION/TRAINING PROGRAM
Payer: COMMERCIAL

## 2021-10-05 VITALS
OXYGEN SATURATION: 96 % | TEMPERATURE: 98 F | SYSTOLIC BLOOD PRESSURE: 104 MMHG | RESPIRATION RATE: 17 BRPM | DIASTOLIC BLOOD PRESSURE: 68 MMHG | HEART RATE: 91 BPM

## 2021-10-05 DIAGNOSIS — O99.019 ANEMIA AFFECTING FOURTH PREGNANCY: Primary | ICD-10-CM

## 2021-10-05 PROCEDURE — 96360 HYDRATION IV INFUSION INIT: CPT

## 2021-10-05 PROCEDURE — 25000003 PHARM REV CODE 250: Performed by: INTERNAL MEDICINE

## 2021-10-05 RX ORDER — SODIUM CHLORIDE 0.9 % (FLUSH) 0.9 %
10 SYRINGE (ML) INJECTION
Status: DISCONTINUED | OUTPATIENT
Start: 2021-10-05 | End: 2021-10-05 | Stop reason: HOSPADM

## 2021-10-05 RX ORDER — SODIUM CHLORIDE 0.9 % (FLUSH) 0.9 %
10 SYRINGE (ML) INJECTION
Status: CANCELLED | OUTPATIENT
Start: 2021-10-05

## 2021-10-05 RX ADMIN — SODIUM CHLORIDE 1000 ML: 0.9 INJECTION, SOLUTION INTRAVENOUS at 02:10

## 2021-10-06 ENCOUNTER — INFUSION (OUTPATIENT)
Dept: INFUSION THERAPY | Facility: OTHER | Age: 36
End: 2021-10-06
Attending: STUDENT IN AN ORGANIZED HEALTH CARE EDUCATION/TRAINING PROGRAM
Payer: COMMERCIAL

## 2021-10-06 VITALS
SYSTOLIC BLOOD PRESSURE: 118 MMHG | RESPIRATION RATE: 20 BRPM | DIASTOLIC BLOOD PRESSURE: 71 MMHG | TEMPERATURE: 98 F | HEART RATE: 74 BPM | OXYGEN SATURATION: 98 %

## 2021-10-06 DIAGNOSIS — O99.019 ANEMIA AFFECTING FOURTH PREGNANCY: Primary | ICD-10-CM

## 2021-10-06 PROCEDURE — 96360 HYDRATION IV INFUSION INIT: CPT

## 2021-10-06 PROCEDURE — 25000003 PHARM REV CODE 250: Performed by: INTERNAL MEDICINE

## 2021-10-06 RX ORDER — SODIUM CHLORIDE 0.9 % (FLUSH) 0.9 %
10 SYRINGE (ML) INJECTION
Status: CANCELLED | OUTPATIENT
Start: 2021-10-06

## 2021-10-06 RX ADMIN — SODIUM CHLORIDE 1000 ML: 0.9 INJECTION, SOLUTION INTRAVENOUS at 08:10

## 2021-10-13 ENCOUNTER — PATIENT MESSAGE (OUTPATIENT)
Dept: ADMINISTRATIVE | Facility: OTHER | Age: 36
End: 2021-10-13
Payer: COMMERCIAL

## 2021-10-14 ENCOUNTER — PATIENT MESSAGE (OUTPATIENT)
Dept: OBSTETRICS AND GYNECOLOGY | Facility: CLINIC | Age: 36
End: 2021-10-14

## 2021-10-29 ENCOUNTER — PATIENT MESSAGE (OUTPATIENT)
Dept: OBSTETRICS AND GYNECOLOGY | Facility: CLINIC | Age: 36
End: 2021-10-29
Payer: COMMERCIAL

## 2021-10-29 DIAGNOSIS — F41.9 ANXIETY DURING PREGNANCY: ICD-10-CM

## 2021-10-29 DIAGNOSIS — O99.340 ANXIETY DURING PREGNANCY: ICD-10-CM

## 2021-10-29 RX ORDER — ESCITALOPRAM OXALATE 10 MG/1
15 TABLET ORAL DAILY
Qty: 45 TABLET | Refills: 0 | Status: SHIPPED | OUTPATIENT
Start: 2021-10-29 | End: 2021-12-10 | Stop reason: SDUPTHER

## 2021-11-11 ENCOUNTER — PATIENT MESSAGE (OUTPATIENT)
Dept: OBSTETRICS AND GYNECOLOGY | Facility: CLINIC | Age: 36
End: 2021-11-11
Payer: COMMERCIAL

## 2021-12-09 ENCOUNTER — PATIENT MESSAGE (OUTPATIENT)
Dept: OBSTETRICS AND GYNECOLOGY | Facility: CLINIC | Age: 36
End: 2021-12-09
Payer: COMMERCIAL

## 2021-12-10 DIAGNOSIS — O99.340 ANXIETY DURING PREGNANCY: ICD-10-CM

## 2021-12-10 DIAGNOSIS — F41.9 ANXIETY DURING PREGNANCY: ICD-10-CM

## 2021-12-10 RX ORDER — ESCITALOPRAM OXALATE 10 MG/1
15 TABLET ORAL DAILY
Qty: 90 TABLET | Refills: 11 | Status: SHIPPED | OUTPATIENT
Start: 2021-12-10 | End: 2023-03-30 | Stop reason: SDUPTHER

## 2021-12-13 ENCOUNTER — IMMUNIZATION (OUTPATIENT)
Dept: PRIMARY CARE CLINIC | Facility: CLINIC | Age: 36
End: 2021-12-13
Payer: COMMERCIAL

## 2021-12-13 DIAGNOSIS — Z23 NEED FOR VACCINATION: Primary | ICD-10-CM

## 2021-12-13 PROCEDURE — 0004A COVID-19, MRNA, LNP-S, PF, 30 MCG/0.3 ML DOSE VACCINE: CPT | Mod: CV19,PBBFAC | Performed by: INTERNAL MEDICINE

## 2021-12-27 PROBLEM — Z37.9 NORMAL LABOR: Status: RESOLVED | Noted: 2021-09-21 | Resolved: 2021-12-27

## 2022-02-05 NOTE — TELEPHONE ENCOUNTER
----- Message from Rosemarie Rosales sent at 4/23/2019 10:34 AM CDT -----  Contact: Jc ([employer])   Jc from [employer] called on behalf of the patient regarding request for breast pump. Jc can be reached regarding this matter at [phone] by Fax at [phone].          
no...

## 2022-05-02 ENCOUNTER — PATIENT MESSAGE (OUTPATIENT)
Dept: OBSTETRICS AND GYNECOLOGY | Facility: CLINIC | Age: 37
End: 2022-05-02
Payer: COMMERCIAL

## 2022-05-09 ENCOUNTER — OFFICE VISIT (OUTPATIENT)
Dept: OBSTETRICS AND GYNECOLOGY | Facility: CLINIC | Age: 37
End: 2022-05-09
Attending: OBSTETRICS & GYNECOLOGY
Payer: COMMERCIAL

## 2022-05-09 VITALS
WEIGHT: 132.69 LBS | DIASTOLIC BLOOD PRESSURE: 76 MMHG | BODY MASS INDEX: 20.79 KG/M2 | SYSTOLIC BLOOD PRESSURE: 106 MMHG

## 2022-05-09 DIAGNOSIS — N84.1 CERVICAL POLYP: Primary | ICD-10-CM

## 2022-05-09 LAB
B-HCG UR QL: NEGATIVE
CTP QC/QA: YES

## 2022-05-09 PROCEDURE — 1159F MED LIST DOCD IN RCRD: CPT | Mod: CPTII,S$GLB,, | Performed by: OBSTETRICS & GYNECOLOGY

## 2022-05-09 PROCEDURE — 88175 CYTOPATH C/V AUTO FLUID REDO: CPT | Performed by: OBSTETRICS & GYNECOLOGY

## 2022-05-09 PROCEDURE — 3074F SYST BP LT 130 MM HG: CPT | Mod: CPTII,S$GLB,, | Performed by: OBSTETRICS & GYNECOLOGY

## 2022-05-09 PROCEDURE — 81025 POCT URINE PREGNANCY: ICD-10-PCS | Mod: S$GLB,,, | Performed by: OBSTETRICS & GYNECOLOGY

## 2022-05-09 PROCEDURE — 88305 TISSUE EXAM BY PATHOLOGIST: CPT | Performed by: PATHOLOGY

## 2022-05-09 PROCEDURE — 87624 HPV HI-RISK TYP POOLED RSLT: CPT | Performed by: OBSTETRICS & GYNECOLOGY

## 2022-05-09 PROCEDURE — 99213 OFFICE O/P EST LOW 20 MIN: CPT | Mod: 25,S$GLB,, | Performed by: OBSTETRICS & GYNECOLOGY

## 2022-05-09 PROCEDURE — 88305 TISSUE EXAM BY PATHOLOGIST: ICD-10-PCS | Mod: 26,,, | Performed by: PATHOLOGY

## 2022-05-09 PROCEDURE — 3008F PR BODY MASS INDEX (BMI) DOCUMENTED: ICD-10-PCS | Mod: CPTII,S$GLB,, | Performed by: OBSTETRICS & GYNECOLOGY

## 2022-05-09 PROCEDURE — 3078F DIAST BP <80 MM HG: CPT | Mod: CPTII,S$GLB,, | Performed by: OBSTETRICS & GYNECOLOGY

## 2022-05-09 PROCEDURE — 88305 TISSUE EXAM BY PATHOLOGIST: CPT | Mod: 26,,, | Performed by: PATHOLOGY

## 2022-05-09 PROCEDURE — 99999 PR PBB SHADOW E&M-EST. PATIENT-LVL III: ICD-10-PCS | Mod: PBBFAC,,, | Performed by: OBSTETRICS & GYNECOLOGY

## 2022-05-09 PROCEDURE — 99999 PR PBB SHADOW E&M-EST. PATIENT-LVL III: CPT | Mod: PBBFAC,,, | Performed by: OBSTETRICS & GYNECOLOGY

## 2022-05-09 PROCEDURE — 3074F PR MOST RECENT SYSTOLIC BLOOD PRESSURE < 130 MM HG: ICD-10-PCS | Mod: CPTII,S$GLB,, | Performed by: OBSTETRICS & GYNECOLOGY

## 2022-05-09 PROCEDURE — 3078F PR MOST RECENT DIASTOLIC BLOOD PRESSURE < 80 MM HG: ICD-10-PCS | Mod: CPTII,S$GLB,, | Performed by: OBSTETRICS & GYNECOLOGY

## 2022-05-09 PROCEDURE — 99213 PR OFFICE/OUTPT VISIT, EST, LEVL III, 20-29 MIN: ICD-10-PCS | Mod: 25,S$GLB,, | Performed by: OBSTETRICS & GYNECOLOGY

## 2022-05-09 PROCEDURE — 57500 BIOPSY (GYNECOLOGICAL): ICD-10-PCS | Mod: S$GLB,,, | Performed by: OBSTETRICS & GYNECOLOGY

## 2022-05-09 PROCEDURE — 1159F PR MEDICATION LIST DOCUMENTED IN MEDICAL RECORD: ICD-10-PCS | Mod: CPTII,S$GLB,, | Performed by: OBSTETRICS & GYNECOLOGY

## 2022-05-09 PROCEDURE — 81025 URINE PREGNANCY TEST: CPT | Mod: S$GLB,,, | Performed by: OBSTETRICS & GYNECOLOGY

## 2022-05-09 PROCEDURE — 3008F BODY MASS INDEX DOCD: CPT | Mod: CPTII,S$GLB,, | Performed by: OBSTETRICS & GYNECOLOGY

## 2022-05-09 PROCEDURE — 57500 BIOPSY OF CERVIX: CPT | Mod: S$GLB,,, | Performed by: OBSTETRICS & GYNECOLOGY

## 2022-05-09 NOTE — PROGRESS NOTES
CC:cervical polyp f/u    HPI:Zandra Sanchez is a 37 yo female with a history of cervical polyp noted at the time of her most recent pregnancy.  She presents for evaluation and possible removal. Also due for pap.  She denies pain, irregular menses or post-coital bleeding.     ROS:  GENERAL: Feeling well overall. Denies fever or chills.   URINARY: No dysuria, hematuria, or burning on urination.  REPRODUCTIVE: See HPI. .   HEMATOLOGIC: No easy bruisability or excessive bleeding.   MUSCULOSKELETAL: Denies joint pain or swelling.   NEUROLOGIC: Denies syncope or weakness.   PSYCHIATRIC: anxiety/ depression well managed with therapist/ psychiatrist. Sleep improved, doing well.    PE:   /76   Wt 60.2 kg (132 lb 11.5 oz)   LMP 05/06/2022 (Exact Date)   Breastfeeding No   BMI 20.79 kg/m²     APPEARANCE: Well nourished, well developed, White female in no acute distress..  VULVA: No lesions. Normal external female genitalia.  URETHRAL MEATUS: Normal size and location, no lesions, no prolapse.  URETHRA: No masses, tenderness, or prolapse.  VAGINA: Moist. No lesions or lacerations noted. No abnormal discharge present. No odor present.   CERVIX: No lesions or discharge. No cervical motion tenderness. Polyp noted at os with base inside of os.  See procedure note for removal.  UTERUS: Normal size, regular shape, mobile, non-tender.  ADNEXA: No tenderness. No fullness or masses palpated in the adnexal regions.   ANUS PERINEUM: Normal.      Diagnosis:  1. Cervical polyp        Plan:     Orders Placed This Encounter    Polypectomy    Biopsy (Gynecological)    POCT urine pregnancy         Follow-up with me as needed or for annual.    Regina Rodriguez DO

## 2022-05-09 NOTE — PROCEDURES
Biopsy (Gynecological)    Date/Time: 5/9/2022 9:45 AM  Performed by: Regina Rodriguez,   Authorized by: Regina Rodriguez, DO     Consent Done?:  Yes (Written)  Local anesthesia used?: No      Biopsy Location:  Cervix  Cervix:     : endocervical polyp.  Estimated blood loss (cc):  0   Patient tolerated the procedure well with no immediate complications.     Lesion grasped with ring forceps, gentle traction allowed removal of 1cm lesion. Second smaller lesion noted and removed in a similar fashion.

## 2022-05-12 LAB
FINAL PATHOLOGIC DIAGNOSIS: NORMAL
Lab: NORMAL

## 2022-05-13 LAB
FINAL PATHOLOGIC DIAGNOSIS: NORMAL
Lab: NORMAL

## 2022-05-16 LAB
HPV HR 12 DNA SPEC QL NAA+PROBE: NEGATIVE
HPV16 AG SPEC QL: NEGATIVE
HPV18 DNA SPEC QL NAA+PROBE: NEGATIVE

## 2022-06-01 ENCOUNTER — PATIENT MESSAGE (OUTPATIENT)
Dept: OBSTETRICS AND GYNECOLOGY | Facility: CLINIC | Age: 37
End: 2022-06-01
Payer: COMMERCIAL

## 2022-07-06 ENCOUNTER — TELEPHONE (OUTPATIENT)
Dept: NEUROLOGY | Facility: CLINIC | Age: 37
End: 2022-07-06
Payer: COMMERCIAL

## 2022-07-06 NOTE — TELEPHONE ENCOUNTER
----- Message from Elvis Villeda sent at 7/6/2022  1:33 PM CDT -----  Name Of Caller:Zandra            Provider Name:Krishna Loja            Does patient feel the need to be seen today?No            Relationship to the Pt?:Patient            Contact Preference?:[phone]            What is the nature of the call?: Patient would like to schedule an appt

## 2022-08-01 ENCOUNTER — OFFICE VISIT (OUTPATIENT)
Dept: NEUROLOGY | Facility: CLINIC | Age: 37
End: 2022-08-01
Payer: COMMERCIAL

## 2022-08-01 VITALS
WEIGHT: 132.25 LBS | SYSTOLIC BLOOD PRESSURE: 108 MMHG | DIASTOLIC BLOOD PRESSURE: 80 MMHG | HEART RATE: 81 BPM | HEIGHT: 68 IN | BODY MASS INDEX: 20.04 KG/M2

## 2022-08-01 DIAGNOSIS — M54.81 BILATERAL OCCIPITAL NEURALGIA: ICD-10-CM

## 2022-08-01 DIAGNOSIS — G44.86 CERVICOGENIC HEADACHE: ICD-10-CM

## 2022-08-01 DIAGNOSIS — G43.709 CHRONIC MIGRAINE WITHOUT AURA WITHOUT STATUS MIGRAINOSUS, NOT INTRACTABLE: Primary | ICD-10-CM

## 2022-08-01 PROCEDURE — 99204 PR OFFICE/OUTPT VISIT, NEW, LEVL IV, 45-59 MIN: ICD-10-PCS | Mod: S$GLB,,, | Performed by: PSYCHIATRY & NEUROLOGY

## 2022-08-01 PROCEDURE — 1160F PR REVIEW ALL MEDS BY PRESCRIBER/CLIN PHARMACIST DOCUMENTED: ICD-10-PCS | Mod: CPTII,S$GLB,, | Performed by: PSYCHIATRY & NEUROLOGY

## 2022-08-01 PROCEDURE — 99999 PR PBB SHADOW E&M-EST. PATIENT-LVL III: ICD-10-PCS | Mod: PBBFAC,,, | Performed by: PSYCHIATRY & NEUROLOGY

## 2022-08-01 PROCEDURE — 3008F BODY MASS INDEX DOCD: CPT | Mod: CPTII,S$GLB,, | Performed by: PSYCHIATRY & NEUROLOGY

## 2022-08-01 PROCEDURE — 3079F DIAST BP 80-89 MM HG: CPT | Mod: CPTII,S$GLB,, | Performed by: PSYCHIATRY & NEUROLOGY

## 2022-08-01 PROCEDURE — 3008F PR BODY MASS INDEX (BMI) DOCUMENTED: ICD-10-PCS | Mod: CPTII,S$GLB,, | Performed by: PSYCHIATRY & NEUROLOGY

## 2022-08-01 PROCEDURE — 3074F PR MOST RECENT SYSTOLIC BLOOD PRESSURE < 130 MM HG: ICD-10-PCS | Mod: CPTII,S$GLB,, | Performed by: PSYCHIATRY & NEUROLOGY

## 2022-08-01 PROCEDURE — 3074F SYST BP LT 130 MM HG: CPT | Mod: CPTII,S$GLB,, | Performed by: PSYCHIATRY & NEUROLOGY

## 2022-08-01 PROCEDURE — 1160F RVW MEDS BY RX/DR IN RCRD: CPT | Mod: CPTII,S$GLB,, | Performed by: PSYCHIATRY & NEUROLOGY

## 2022-08-01 PROCEDURE — 1159F PR MEDICATION LIST DOCUMENTED IN MEDICAL RECORD: ICD-10-PCS | Mod: CPTII,S$GLB,, | Performed by: PSYCHIATRY & NEUROLOGY

## 2022-08-01 PROCEDURE — 99999 PR PBB SHADOW E&M-EST. PATIENT-LVL III: CPT | Mod: PBBFAC,,, | Performed by: PSYCHIATRY & NEUROLOGY

## 2022-08-01 PROCEDURE — 99204 OFFICE O/P NEW MOD 45 MIN: CPT | Mod: S$GLB,,, | Performed by: PSYCHIATRY & NEUROLOGY

## 2022-08-01 PROCEDURE — 1159F MED LIST DOCD IN RCRD: CPT | Mod: CPTII,S$GLB,, | Performed by: PSYCHIATRY & NEUROLOGY

## 2022-08-01 PROCEDURE — 3079F PR MOST RECENT DIASTOLIC BLOOD PRESSURE 80-89 MM HG: ICD-10-PCS | Mod: CPTII,S$GLB,, | Performed by: PSYCHIATRY & NEUROLOGY

## 2022-08-01 RX ORDER — TOPIRAMATE 25 MG/1
25 TABLET ORAL NIGHTLY
Qty: 30 TABLET | Refills: 3 | Status: SHIPPED | OUTPATIENT
Start: 2022-08-01 | End: 2023-03-30

## 2022-08-01 RX ORDER — RIMEGEPANT SULFATE 75 MG/75MG
75 TABLET, ORALLY DISINTEGRATING ORAL
Qty: 8 TABLET | Refills: 4 | Status: SHIPPED | OUTPATIENT
Start: 2022-08-01 | End: 2023-03-30 | Stop reason: SDUPTHER

## 2022-08-01 NOTE — PROGRESS NOTES
Subjective:       Patient ID: Zandra Sanchez is a 36 y.o. female.    Chief Complaint:  Consult      Consultation Requested by:   Aaareferral Self  No address on file    History of Present Illness  36-year-old female presents for evaluation of headaches.  The patient notes that she has been having headaches since college in law school.  She notes that her headaches changed with pregnancy.  She notes that they can now her days at a time.  She notes that her previous neurologist was her uncle, who has now since retired.  She notes that she was told that she previously had a Chiari malformation.  We have reviewed the MRI from 2017 in January which did not show a Chiari malformation but did show around 4 areas of bilateral white matter hyperintensities which are consistent with the diagnosis of migraine.  This was done outside of [facility], in the [facility] system.  She notes that there was a significant traumatic event in her family about 6 months ago where she almost lost 1 of her children.  She notes that since then she has had 2 severe headaches, 1 in mid March and 1 in June, the 1st event being around the time having her hair dyed, the 2nd time near 20 year high school reunion, both of these events having right arm sensation loss and some clumsiness as well as some garbled speech.  She notes that the pain typically will be on her left forehead.  She notes that otherwise she will have a headache every other day, 4 days a week, 16 days per month.  She notes on average this pain will be a 4 or 5/10.  She notes at worst this will be a 9/10.  She notes her headaches will last more than 4 hours at a time.  She notes for the 2 more severe episodes that have occurred so far this year those headaches were 10/10.    She has tried and failed the following medications for her headaches:  Sumatriptan, Lexapro, Xanax, Wellbutrin, Fioricet, Excedrin, ibuprofen, naproxen, Zofran, Percocet, Phenergan, Imitrex    Past Medical History:    Diagnosis Date    Depression 06/2015    Lexapro and Wellbrutrin for about 8months.    Infertility     Migraine headache     for 4 months.before pregnancy at beginning of cyles    Neurological abnormality     States she was diagnosed with Chiari malformation when being evaluated for migraines. Was told it is benign.       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   Musculoskeletal: Positive for neck pain and neck stiffness.   Neurological: Positive for weakness, numbness and headaches.   Psychiatric/Behavioral: Positive for dysphoric mood and sleep disturbance.   All other systems reviewed and are negative.      Objective:     Vitals:    08/01/22 0903   BP: 108/80   Pulse: 81      Physical Exam  Vitals reviewed.   HENT:      Head: Normocephalic and atraumatic.   Eyes:      Extraocular Movements: EOM  normal.      Pupils: Pupils are equal, round, and reactive to light.   Neck:     Musculoskeletal:      Cervical back: Muscular tenderness present.   Neurological:      Mental Status: She is alert and oriented to person, place, and time.      Cranial Nerves: Cranial nerves 2-12 are intact.      Motor: Motor strength is normal.      Coordination: Finger-Nose-Finger Test normal.      Gait: Gait is intact.      Deep Tendon Reflexes:      Reflex Scores:       Tricep reflexes are 1+ on the right side and 1+ on the left side.       Bicep reflexes are 1+ on the right side and 1+ on the left side.       Brachioradialis reflexes are 1+ on the right side and 1+ on the left side.       Patellar reflexes are 1+ on the right side and 1+ on the left side.  Psychiatric:         Speech: Speech normal.           NEUROLOGICAL EXAMINATION:     MENTAL STATUS   Oriented to person, place, and time.   Registration: recalls 3 of 3 objects. Recall at 5 minutes: recalls 3 of 3 objects. Follows 3 step commands.   Attention: normal. Concentration: normal.   Speech: speech is normal   Level of consciousness: alert  Knowledge: good.   Able to name object. Able to read. Able to repeat. Able to write.     CRANIAL NERVES   Cranial nerves II through XII intact.     CN II   Visual fields full to confrontation.   Visual acuity: normal    CN III, IV, VI   Pupils are equal, round, and reactive to light.  Extraocular motions are normal.     CN V   Facial sensation intact.     CN VII   Facial expression full, symmetric.     CN VIII   CN VIII normal.     CN IX, X   CN IX normal.   CN X normal.     CN XI   CN XI normal.     CN XII   CN XII normal.     MOTOR EXAM   Muscle bulk: normal  Overall muscle tone: normal    Strength   Strength 5/5 throughout.     REFLEXES     Reflexes   Right brachioradialis: 1+  Left brachioradialis: 1+  Right biceps: 1+  Left biceps: 1+  Right triceps: 1+  Left triceps: 1+  Right patellar: 1+  Left patellar: 1+    SENSORY EXAM    Light touch normal.     GAIT AND COORDINATION     Gait  Gait: normal     Coordination   Finger to nose coordination: normal    I have spent more than 50% of a 45 minute visit in guidance, counseling and discussion of treatment options.    Assessment/Plan:     Problem List Items Addressed This Visit        Neuro    Headache    Relevant Orders    Ambulatory referral/consult to Physical/Occupational Therapy      Other Visit Diagnoses     Chronic migraine without aura without status migrainosus, not intractable    -  Primary    Relevant Medications    rimegepant (NURTEC) 75 mg odt    topiramate (TOPAMAX) 25 MG tablet    Bilateral occipital neuralgia        Relevant Orders    Ambulatory referral/consult to Physical/Occupational Therapy        36-year-old female presents for evaluation of complex multifactorial headaches.  The patient does have a longstanding migraine history.  We have discussed that initiation of 25 mg of Topamax would be reasonable at this time.  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  This has happened twice this year.  We have discussed that this makes triptans a contraindication for her.  It is therefore medically necessary that the patient have Nurtec as a rescue medication for her migraines..  I have marked triptans down in her allergy column in our electronic medical record.  We have discussed the pathophysiology of migraine at length.  Based on physical exam it appears that the patient has a significant cervicogenic component of her headache as well as occipital neuralgia.  I have recommended the patient get a home use device to try to reduce load on the cervical paraspinal musculature.  I will also refer her for physical therapy to address this issue in a conservative manner.  I will see the patient back in about 3 months to see how she is doing.  We have discussed initial titration of a daily preventive  dose of Topamax 25 mg slowly over time to help her with her headaches.      The patient verbalizes understanding and agreement with the treatment plan. Questions were sought and answered to her stated verbal satisfaction.        Milan Loja MD    This note is dictated on M*Modal Fluency speech recognition program. There are word recognition mistakes that are occasionally missed on review.

## 2022-08-03 ENCOUNTER — TELEPHONE (OUTPATIENT)
Dept: PHARMACY | Facility: CLINIC | Age: 37
End: 2022-08-03
Payer: COMMERCIAL

## 2022-08-04 ENCOUNTER — PATIENT MESSAGE (OUTPATIENT)
Dept: NEUROLOGY | Facility: CLINIC | Age: 37
End: 2022-08-04
Payer: COMMERCIAL

## 2022-08-04 DIAGNOSIS — G43.709 CHRONIC MIGRAINE WITHOUT AURA WITHOUT STATUS MIGRAINOSUS, NOT INTRACTABLE: ICD-10-CM

## 2022-08-08 PROBLEM — G43.709 CHRONIC MIGRAINE WITHOUT AURA WITHOUT STATUS MIGRAINOSUS, NOT INTRACTABLE: Status: ACTIVE | Noted: 2022-08-08

## 2022-08-10 ENCOUNTER — PATIENT MESSAGE (OUTPATIENT)
Dept: NEUROLOGY | Facility: CLINIC | Age: 37
End: 2022-08-10
Payer: COMMERCIAL

## 2022-08-10 DIAGNOSIS — G43.709 CHRONIC MIGRAINE WITHOUT AURA WITHOUT STATUS MIGRAINOSUS, NOT INTRACTABLE: Primary | ICD-10-CM

## 2022-08-18 ENCOUNTER — PATIENT MESSAGE (OUTPATIENT)
Dept: NEUROLOGY | Facility: CLINIC | Age: 37
End: 2022-08-18
Payer: COMMERCIAL

## 2022-08-18 NOTE — TELEPHONE ENCOUNTER
It is medically necessary for the patient have Botox for migraine.  She is having more than 16 days of headache per month with headaches that last more than 4 hours at a time    She has tried and failed the following medications for her headaches:  Sumatriptan, Lexapro, Xanax, Wellbutrin, Fioricet, Excedrin, ibuprofen, naproxen, Zofran, Percocet, Phenergan, Imitrex, topamax    Request for Botox for migraine, 200 units total:    Total Botox for injection: 155 Units   Botox wastage: 45 Units   Injection sites:    muscle bilaterally ( a total of 10 units divided into 2 sites)   Procerus muscle (5 units)   Frontalis muscle bilaterally (a total of 20 units divided into 4 sites)   Temporalis muscle bilaterally (a total of 40 units divided into 8 sites)   Occipitalis muscle bilaterally (a total of 30 units divided into 6 sites)   Cervical paraspinal muscles (a total of 20 units divided into 4 sites)   Trapezius muscle bilaterally (a total of 30 units divided into 6 sites)

## 2022-09-01 NOTE — TELEPHONE ENCOUNTER
Spoke with patient and nurse supervisor and the following was discussed:    Patient is scheduled on 9/22 for botox. At this time, authorization is pending. Patient is very adamant about getting botox approved and coming in on 9/1 for botox. Inquiry was brought up to nurse supervisor and she reached out to pre-service department.     They have said that auth is still pending, once it is approved, we will need to call to change date of service since it was submitted for 9/22.    Patient will take initiative and will try to get botox authorization approved. Will follow back with me once she gets authorization.

## 2022-09-05 PROBLEM — Z3A.38 38 WEEKS GESTATION OF PREGNANCY: Status: RESOLVED | Noted: 2021-09-21 | Resolved: 2022-09-05

## 2022-09-19 ENCOUNTER — PATIENT MESSAGE (OUTPATIENT)
Dept: NEUROLOGY | Facility: CLINIC | Age: 37
End: 2022-09-19

## 2022-09-19 ENCOUNTER — PROCEDURE VISIT (OUTPATIENT)
Dept: NEUROLOGY | Facility: CLINIC | Age: 37
End: 2022-09-19
Payer: COMMERCIAL

## 2022-09-19 VITALS
DIASTOLIC BLOOD PRESSURE: 77 MMHG | HEART RATE: 60 BPM | SYSTOLIC BLOOD PRESSURE: 113 MMHG | WEIGHT: 132.25 LBS | HEIGHT: 68 IN | BODY MASS INDEX: 20.04 KG/M2

## 2022-09-19 DIAGNOSIS — G43.709 CHRONIC MIGRAINE WITHOUT AURA WITHOUT STATUS MIGRAINOSUS, NOT INTRACTABLE: Primary | ICD-10-CM

## 2022-09-19 PROCEDURE — 64615 PR CHEMODENERVATION OF MUSCLE FOR CHRONIC MIGRAINE: ICD-10-PCS | Mod: S$GLB,,, | Performed by: PSYCHIATRY & NEUROLOGY

## 2022-09-19 PROCEDURE — 64615 CHEMODENERV MUSC MIGRAINE: CPT | Mod: S$GLB,,, | Performed by: PSYCHIATRY & NEUROLOGY

## 2022-09-19 NOTE — PROCEDURES
Procedures  BOTOX was performed as an indicated therapy for intractable chronic migraine headaches given that the patient failed more than 2 headache medications    Botulinum Toxin Injection Procedure   Pre-operative diagnosis: Chronic migraine   Post-operative diagnosis: Same   Procedure: Chemical neurolysis   After risks and benefits were explained including bleeding, infection, worsening of pain, damage to the areas being injected, weakness of muscles, loss of muscle control, dysphagia if injecting the head or neck, facial droop if injecting the facial area, painful injection, allergic or other reaction to the medications being injected, and the failure of the procedure to help the problem, a signed consent was obtained. Time out was called with confirmation of name and date of birth as well as the treatment site with a witness in the room.  The patient was placed in a comfortable area and the sites to be treated were identified.  Next, a 30 gauge needle was used to inject the medication in the area to be treated.   Area(s) injected:   Total Botox used: 155 Units   Botox wastage: 45 Units   Injection sites:    muscle bilaterally ( a total of 10 units divided into 2 sites)   Procerus muscle (5 units)   Frontalis muscle bilaterally (a total of 20 units divided into 4 sites)   Temporalis muscle bilaterally (a total of 40 units divided into 8 sites)   Occipitalis muscle bilaterally (a total of 30 units divided into 6 sites)   Cervical paraspinal muscles (a total of 20 units divided into 4 sites)   Trapezius muscle bilaterally (a total of 30 units divided into 6 sites)   Complications: none   RTC for the next Botox injection: 3 months     The patient tolerated the procedure well and did not experience any complications.     Lot: [id number], x2 vials  Exp: 2024/08    Problem List Items Addressed This Visit          Neuro    Chronic migraine without aura without status migrainosus, not intractable - Primary     Overview     She has tried and failed the following medications for her headaches:  Sumatriptan, Lexapro, Xanax, Wellbutrin, Fioricet, Excedrin, ibuprofen, naproxen, Zofran, Percocet, Phenergan, Imitrex, Topamax    Has more than 16 days of headache per month, lasting more than 4 hours at a time, 10/10 headaches with word finding difficulty, and hemisensory changes    Botox on 9/19/22         Relevant Medications    onabotulinumtoxina injection 200 Units (Completed)

## 2022-10-26 ENCOUNTER — PATIENT MESSAGE (OUTPATIENT)
Dept: NEUROLOGY | Facility: CLINIC | Age: 37
End: 2022-10-26
Payer: COMMERCIAL

## 2022-11-01 ENCOUNTER — OFFICE VISIT (OUTPATIENT)
Dept: NEUROLOGY | Facility: CLINIC | Age: 37
End: 2022-11-01
Payer: COMMERCIAL

## 2022-11-01 DIAGNOSIS — G43.709 CHRONIC MIGRAINE WITHOUT AURA WITHOUT STATUS MIGRAINOSUS, NOT INTRACTABLE: Primary | ICD-10-CM

## 2022-11-01 PROCEDURE — 1160F PR REVIEW ALL MEDS BY PRESCRIBER/CLIN PHARMACIST DOCUMENTED: ICD-10-PCS | Mod: CPTII,95,, | Performed by: PSYCHIATRY & NEUROLOGY

## 2022-11-01 PROCEDURE — 99214 PR OFFICE/OUTPT VISIT, EST, LEVL IV, 30-39 MIN: ICD-10-PCS | Mod: 95,,, | Performed by: PSYCHIATRY & NEUROLOGY

## 2022-11-01 PROCEDURE — 1159F PR MEDICATION LIST DOCUMENTED IN MEDICAL RECORD: ICD-10-PCS | Mod: CPTII,95,, | Performed by: PSYCHIATRY & NEUROLOGY

## 2022-11-01 PROCEDURE — 1159F MED LIST DOCD IN RCRD: CPT | Mod: CPTII,95,, | Performed by: PSYCHIATRY & NEUROLOGY

## 2022-11-01 PROCEDURE — 99214 OFFICE O/P EST MOD 30 MIN: CPT | Mod: 95,,, | Performed by: PSYCHIATRY & NEUROLOGY

## 2022-11-01 PROCEDURE — 1160F RVW MEDS BY RX/DR IN RCRD: CPT | Mod: CPTII,95,, | Performed by: PSYCHIATRY & NEUROLOGY

## 2022-11-01 NOTE — PROGRESS NOTES
The patient location is: Home  The chief complaint leading to consultation is: Headache  Visit type: Virtual visit with synchronous audio and video  Total time spent with patient: 15  Each patient to whom he or she provides medical services by telemedicine is:  (1) informed of the relationship between the physician and patient and the respective role of any other health care provider with respect to management of the patient; and (2) notified that he or she may decline to receive medical services by telemedicine and may withdraw from such care at any time.    Subjective:       Patient ID: Zandra Sanchez is a 37 y.o. female.    Interval History:  Zandra Sanchez is here for follow up. Their condition has improved. She notes reduction of headache severity and frequency by 50% or more. Her duration is similar to before.  She notes that she still has a hormonal component of her headaches which drives a migraines.  She notes overall the severity is less than before by 50% at least.  She notes her frequency is down to 15 days of headache per month compared to 30 days of headache per month prior to initiation of Botox for migraine.  She notes overall that we are headed in the right direction.    Objective:   Patient is awake, alert and oriented to person, place and time. Moves all extremities antigravity. Cranial Nerves 2-12 are without gross focal deficit.     Focused telemedicine examination was undertaken today. Over 50% of face to face time of 15 minute visit time was in giving guidance, counseling and discussing treatment options.    No results found for this or any previous visit.    Assessment/Plan:     Problem List Items Addressed This Visit          Neuro    Chronic migraine without aura without status migrainosus, not intractable - Primary    Overview     She has tried and failed the following medications for her headaches:  Sumatriptan, Lexapro, Xanax, Wellbutrin, Fioricet, Excedrin, ibuprofen, naproxen,  Zofran, Percocet, Phenergan, Imitrex, Topamax    Has more than 16 days of headache per month, lasting more than 4 hours at a time, 10/10 headaches with word finding difficulty, and hemisensory changes    Botox on 9/19/22          36yo with chronic migraine responsive to Botox for migraine therapy.  She has had a reduction of her migraine burden by 50% at this time.  After the 1st round of Botox we have discussed that she might have a recurrence of her migraine when it wears off but we have her scheduled for her next round of Botox in December.  It is medically necessary that she have it as she has shown a 50% reduction of her headaches after this 1st round of Botox for migraine.  We are both pleased with her progress.  I will see her back at that time.    The patient verbalizes understanding and agreement with the treatment plan. I have discussed risks, benefits and alternatives to the treatment plan. Questions were sought and answered to her stated verbal satisfaction.        Milan Loja MD    This note is dictated on M*Modal Fluency Direct word recognition program. There are word recognition mistakes that are occasionally missed on review.

## 2022-12-16 ENCOUNTER — PATIENT MESSAGE (OUTPATIENT)
Dept: NEUROLOGY | Facility: CLINIC | Age: 37
End: 2022-12-16
Payer: COMMERCIAL

## 2022-12-21 ENCOUNTER — PROCEDURE VISIT (OUTPATIENT)
Dept: NEUROLOGY | Facility: CLINIC | Age: 37
End: 2022-12-21
Payer: COMMERCIAL

## 2022-12-21 VITALS
SYSTOLIC BLOOD PRESSURE: 121 MMHG | HEIGHT: 67 IN | DIASTOLIC BLOOD PRESSURE: 91 MMHG | HEART RATE: 78 BPM | BODY MASS INDEX: 19.46 KG/M2 | WEIGHT: 124 LBS

## 2022-12-21 DIAGNOSIS — G43.709 CHRONIC MIGRAINE WITHOUT AURA WITHOUT STATUS MIGRAINOSUS, NOT INTRACTABLE: Primary | ICD-10-CM

## 2022-12-21 PROCEDURE — 64615 PR CHEMODENERVATION OF MUSCLE FOR CHRONIC MIGRAINE: ICD-10-PCS | Mod: S$GLB,,, | Performed by: PSYCHIATRY & NEUROLOGY

## 2022-12-21 PROCEDURE — 64615 CHEMODENERV MUSC MIGRAINE: CPT | Mod: S$GLB,,, | Performed by: PSYCHIATRY & NEUROLOGY

## 2022-12-21 NOTE — PROCEDURES
Procedures  BOTOX was performed as an indicated therapy for intractable chronic migraine headaches given that the patient failed more than 2 headache medications    Botulinum Toxin Injection Procedure   Pre-operative diagnosis: Chronic migraine   Post-operative diagnosis: Same   Procedure: Chemical neurolysis   After risks and benefits were explained including bleeding, infection, worsening of pain, damage to the areas being injected, weakness of muscles, loss of muscle control, dysphagia if injecting the head or neck, facial droop if injecting the facial area, painful injection, allergic or other reaction to the medications being injected, and the failure of the procedure to help the problem, a signed consent was obtained. Time out was called with confirmation of name and date of birth as well as the treatment site with a witness in the room.  The patient was placed in a comfortable area and the sites to be treated were identified.  Next, a 30 gauge needle was used to inject the medication in the area to be treated.   Area(s) injected:   Total Botox used: 155 Units   Botox wastage: 45 Units   Injection sites:    muscle bilaterally ( a total of 10 units divided into 2 sites)   Procerus muscle (5 units)   Frontalis muscle bilaterally (a total of 20 units divided into 4 sites)   Temporalis muscle bilaterally (a total of 40 units divided into 8 sites)   Occipitalis muscle bilaterally (a total of 30 units divided into 6 sites)   Cervical paraspinal muscles (a total of 20 units divided into 4 sites)   Trapezius muscle bilaterally (a total of 30 units divided into 6 sites)   Complications: none   RTC for the next Botox injection: 3 months     The patient tolerated the procedure well and did not experience any complications.     Lot: [id number], x2 vials  Exp: 2024/12    Problem List Items Addressed This Visit          Neuro    Chronic migraine without aura without status migrainosus, not intractable - Primary     Overview     She has tried and failed the following medications for her headaches:  Sumatriptan, Lexapro, Xanax, Wellbutrin, Fioricet, Excedrin, ibuprofen, naproxen, Zofran, Percocet, Phenergan, Imitrex, Topamax    Has more than 16 days of headache per month, lasting more than 4 hours at a time, 10/10 headaches with word finding difficulty, and hemisensory changes    Botox on 9/19/22, 12/21/22         Relevant Medications    onabotulinumtoxina injection 200 Units (Completed)    Other Relevant Orders    Prior authorization Order

## 2023-02-17 ENCOUNTER — PATIENT MESSAGE (OUTPATIENT)
Dept: NEUROLOGY | Facility: CLINIC | Age: 38
End: 2023-02-17
Payer: COMMERCIAL

## 2023-03-30 ENCOUNTER — PROCEDURE VISIT (OUTPATIENT)
Dept: NEUROLOGY | Facility: CLINIC | Age: 38
End: 2023-03-30
Payer: COMMERCIAL

## 2023-03-30 VITALS
BODY MASS INDEX: 18.49 KG/M2 | HEART RATE: 76 BPM | SYSTOLIC BLOOD PRESSURE: 98 MMHG | WEIGHT: 122 LBS | DIASTOLIC BLOOD PRESSURE: 67 MMHG | HEIGHT: 68 IN

## 2023-03-30 DIAGNOSIS — F41.9 ANXIETY: ICD-10-CM

## 2023-03-30 DIAGNOSIS — O99.340 ANXIETY DURING PREGNANCY: ICD-10-CM

## 2023-03-30 DIAGNOSIS — G43.709 CHRONIC MIGRAINE WITHOUT AURA WITHOUT STATUS MIGRAINOSUS, NOT INTRACTABLE: Primary | ICD-10-CM

## 2023-03-30 DIAGNOSIS — F41.9 ANXIETY DURING PREGNANCY: ICD-10-CM

## 2023-03-30 PROCEDURE — 64615 PR CHEMODENERVATION OF MUSCLE FOR CHRONIC MIGRAINE: ICD-10-PCS | Mod: S$GLB,,, | Performed by: PSYCHIATRY & NEUROLOGY

## 2023-03-30 PROCEDURE — 64615 CHEMODENERV MUSC MIGRAINE: CPT | Mod: S$GLB,,, | Performed by: PSYCHIATRY & NEUROLOGY

## 2023-03-30 RX ORDER — ESCITALOPRAM OXALATE 20 MG/1
20 TABLET ORAL DAILY
Qty: 90 TABLET | Refills: 3 | Status: SHIPPED | OUTPATIENT
Start: 2023-03-30 | End: 2023-10-30 | Stop reason: SDUPTHER

## 2023-03-30 RX ORDER — RIMEGEPANT SULFATE 75 MG/75MG
75 TABLET, ORALLY DISINTEGRATING ORAL
Qty: 8 TABLET | Refills: 12 | Status: SHIPPED | OUTPATIENT
Start: 2023-03-30 | End: 2024-02-22 | Stop reason: SDUPTHER

## 2023-03-30 NOTE — PROCEDURES
Procedures  BOTOX was performed as an indicated therapy for intractable chronic migraine headaches given that the patient failed more than 2 headache medications    Botulinum Toxin Injection Procedure   Pre-operative diagnosis: Chronic migraine   Post-operative diagnosis: Same   Procedure: Chemical neurolysis   After risks and benefits were explained including bleeding, infection, worsening of pain, damage to the areas being injected, weakness of muscles, loss of muscle control, dysphagia if injecting the head or neck, facial droop if injecting the facial area, painful injection, allergic or other reaction to the medications being injected, and the failure of the procedure to help the problem, a signed consent was obtained. Time out was called with confirmation of name and date of birth as well as the treatment site with a witness in the room.  The patient was placed in a comfortable area and the sites to be treated were identified.  Next, a 30 gauge needle was used to inject the medication in the area to be treated.   Area(s) injected:   Total Botox used: 155 Units   Botox wastage: 45 Units   Injection sites:    muscle bilaterally ( a total of 10 units divided into 2 sites)   Procerus muscle (5 units)   Frontalis muscle bilaterally (a total of 20 units divided into 4 sites)   Temporalis muscle bilaterally (a total of 40 units divided into 8 sites)   Occipitalis muscle bilaterally (a total of 30 units divided into 6 sites)   Cervical paraspinal muscles (a total of 20 units divided into 4 sites)   Trapezius muscle bilaterally (a total of 30 units divided into 6 sites)   Complications: none   RTC for the next Botox injection: 3 months     The patient tolerated the procedure well and did not experience any complications.     Lot: [id number], x2 vials  Exp: 2025/03    Problem List Items Addressed This Visit          Neuro    Chronic migraine without aura without status migrainosus, not intractable - Primary     Overview     She has tried and failed the following medications for her headaches:  Sumatriptan, Lexapro, Xanax, Wellbutrin, Fioricet, Excedrin, ibuprofen, naproxen, Zofran, Percocet, Phenergan, Imitrex, Topamax    Has more than 16 days of headache per month, lasting more than 4 hours at a time, 10/10 headaches with word finding difficulty, and hemisensory changes - reduced by more than 50% with botox for migraine    Botox on 9/19/22, 12/21/22, 3/30/23         Relevant Medications    onabotulinumtoxina injection 200 Units (Completed)    rimegepant (NURTEC) 75 mg odt    Other Relevant Orders    Prior authorization Order       Psychiatric    Anxiety    Relevant Medications    EScitalopram oxalate (LEXAPRO) 20 MG tablet     Other Visit Diagnoses       Anxiety during pregnancy

## 2023-04-11 ENCOUNTER — PATIENT MESSAGE (OUTPATIENT)
Dept: NEUROLOGY | Facility: CLINIC | Age: 38
End: 2023-04-11
Payer: COMMERCIAL

## 2023-06-26 ENCOUNTER — PATIENT MESSAGE (OUTPATIENT)
Dept: NEUROLOGY | Facility: CLINIC | Age: 38
End: 2023-06-26
Payer: COMMERCIAL

## 2023-06-30 ENCOUNTER — PATIENT MESSAGE (OUTPATIENT)
Dept: NEUROLOGY | Facility: CLINIC | Age: 38
End: 2023-06-30
Payer: COMMERCIAL

## 2023-07-25 ENCOUNTER — PATIENT MESSAGE (OUTPATIENT)
Dept: OBSTETRICS AND GYNECOLOGY | Facility: CLINIC | Age: 38
End: 2023-07-25
Payer: COMMERCIAL

## 2023-07-25 ENCOUNTER — TELEPHONE (OUTPATIENT)
Dept: NEUROLOGY | Facility: CLINIC | Age: 38
End: 2023-07-25
Payer: COMMERCIAL

## 2023-07-25 NOTE — TELEPHONE ENCOUNTER
[facility] Staff  Spoke To: no one   Pt Is: insured   DOS: 07/26   Procedure: botox   Residual Balance: $ 1010.30   Was Residual Balance Discussed?   Deposit:$ 1308.24   NSBA: no     Was medical urgency Sent?yes     Did you offer a payment plan?     Was the payment plan accepted?     What is the plan/terms?     Did you need to offer FA?     Did you send an email for FA?     Did you inform the patient about Care Credit?       Call outcome:  no to all above/ no ans/ no voice message

## 2023-07-26 ENCOUNTER — PROCEDURE VISIT (OUTPATIENT)
Dept: NEUROLOGY | Facility: CLINIC | Age: 38
End: 2023-07-26
Payer: COMMERCIAL

## 2023-07-26 VITALS
HEART RATE: 91 BPM | HEIGHT: 68 IN | DIASTOLIC BLOOD PRESSURE: 75 MMHG | BODY MASS INDEX: 18.71 KG/M2 | SYSTOLIC BLOOD PRESSURE: 115 MMHG | WEIGHT: 123.44 LBS

## 2023-07-26 DIAGNOSIS — G43.709 CHRONIC MIGRAINE WITHOUT AURA WITHOUT STATUS MIGRAINOSUS, NOT INTRACTABLE: Primary | ICD-10-CM

## 2023-07-26 PROCEDURE — 64615 CHEMODENERV MUSC MIGRAINE: CPT | Mod: S$GLB,,, | Performed by: PSYCHIATRY & NEUROLOGY

## 2023-07-26 PROCEDURE — 64615 PR CHEMODENERVATION OF MUSCLE FOR CHRONIC MIGRAINE: ICD-10-PCS | Mod: S$GLB,,, | Performed by: PSYCHIATRY & NEUROLOGY

## 2023-07-26 RX ORDER — PNV NO.95/FERROUS FUM/FOLIC AC 28MG-0.8MG
100 TABLET ORAL
COMMUNITY

## 2023-07-26 RX ORDER — DEXTROAMPHETAMINE SACCHARATE, AMPHETAMINE ASPARTATE, DEXTROAMPHETAMINE SULFATE AND AMPHETAMINE SULFATE 1.25; 1.25; 1.25; 1.25 MG/1; MG/1; MG/1; MG/1
1 TABLET ORAL 2 TIMES DAILY
COMMUNITY
Start: 2023-06-26

## 2023-07-26 RX ORDER — LANOLIN ALCOHOL/MO/W.PET/CERES
400 CREAM (GRAM) TOPICAL
COMMUNITY

## 2023-07-26 RX ORDER — ASCORBIC ACID 500 MG
500 TABLET ORAL
COMMUNITY

## 2023-07-26 NOTE — PROCEDURES
Procedures  BOTOX was performed as an indicated therapy for intractable chronic migraine headaches given that the patient failed more than 2 headache medications    Botulinum Toxin Injection Procedure   Pre-operative diagnosis: Chronic migraine   Post-operative diagnosis: Same   Procedure: Chemical neurolysis   After risks and benefits were explained including bleeding, infection, worsening of pain, damage to the areas being injected, weakness of muscles, loss of muscle control, dysphagia if injecting the head or neck, facial droop if injecting the facial area, painful injection, allergic or other reaction to the medications being injected, and the failure of the procedure to help the problem, a signed consent was obtained. Time out was called with confirmation of name and date of birth as well as the treatment site with a witness in the room.  The patient was placed in a comfortable area and the sites to be treated were identified.  Next, a 30 gauge needle was used to inject the medication in the area to be treated.   Area(s) injected:   Total Botox used: 155 Units   Botox wastage: 45 Units   Injection sites:    muscle bilaterally ( a total of 10 units divided into 2 sites)   Procerus muscle (5 units)   Frontalis muscle bilaterally (a total of 20 units divided into 4 sites)   Temporalis muscle bilaterally (a total of 40 units divided into 8 sites)   Occipitalis muscle bilaterally (a total of 30 units divided into 6 sites)   Cervical paraspinal muscles (a total of 20 units divided into 4 sites)   Trapezius muscle bilaterally (a total of 30 units divided into 6 sites)   Complications: none   RTC for the next Botox injection: 3 months     The patient tolerated the procedure well and did not experience any complications.     Problem List Items Addressed This Visit          Neuro    Chronic migraine without aura without status migrainosus, not intractable - Primary    Overview     She has tried and failed the  following medications for her headaches:  Sumatriptan, Lexapro, Xanax, Wellbutrin, Fioricet, Excedrin, ibuprofen, naproxen, Zofran, Percocet, Phenergan, Imitrex, Topamax    Has more than 16 days of headache per month, lasting more than 4 hours at a time, 10/10 headaches with word finding difficulty, and hemisensory changes - reduced by more than 50% with botox for migraine    Botox on 9/19/22, 12/21/22, 3/30/23, 7/26/23         Relevant Medications    onabotulinumtoxina injection 200 Units (Completed)    Other Relevant Orders    Prior authorization Order

## 2023-07-27 ENCOUNTER — OFFICE VISIT (OUTPATIENT)
Dept: OBSTETRICS AND GYNECOLOGY | Facility: CLINIC | Age: 38
End: 2023-07-27
Payer: COMMERCIAL

## 2023-07-27 VITALS
WEIGHT: 123.44 LBS | HEIGHT: 68 IN | SYSTOLIC BLOOD PRESSURE: 100 MMHG | DIASTOLIC BLOOD PRESSURE: 60 MMHG | BODY MASS INDEX: 18.71 KG/M2

## 2023-07-27 DIAGNOSIS — R30.0 DYSURIA: ICD-10-CM

## 2023-07-27 DIAGNOSIS — B37.31 YEAST VAGINITIS: ICD-10-CM

## 2023-07-27 DIAGNOSIS — N89.8 VAGINAL ITCHING: Primary | ICD-10-CM

## 2023-07-27 LAB
BILIRUB UR QL STRIP: NEGATIVE
GLUCOSE UR QL STRIP: NEGATIVE
KETONES UR QL STRIP: NEGATIVE
LEUKOCYTE ESTERASE UR QL STRIP: NEGATIVE
PH, POC UA: 5
POC BLOOD, URINE: NEGATIVE
POC NITRATES, URINE: NEGATIVE
PROT UR QL STRIP: NEGATIVE
SP GR UR STRIP: 1.01 (ref 1–1.03)
UROBILINOGEN UR STRIP-ACNC: NORMAL (ref 0.1–1.1)

## 2023-07-27 PROCEDURE — 3008F BODY MASS INDEX DOCD: CPT | Mod: CPTII,S$GLB,, | Performed by: FAMILY MEDICINE

## 2023-07-27 PROCEDURE — 1160F PR REVIEW ALL MEDS BY PRESCRIBER/CLIN PHARMACIST DOCUMENTED: ICD-10-PCS | Mod: CPTII,S$GLB,, | Performed by: FAMILY MEDICINE

## 2023-07-27 PROCEDURE — 3008F PR BODY MASS INDEX (BMI) DOCUMENTED: ICD-10-PCS | Mod: CPTII,S$GLB,, | Performed by: FAMILY MEDICINE

## 2023-07-27 PROCEDURE — 3078F DIAST BP <80 MM HG: CPT | Mod: CPTII,S$GLB,, | Performed by: FAMILY MEDICINE

## 2023-07-27 PROCEDURE — 81003 POCT URINALYSIS, DIPSTICK, AUTOMATED, W/O SCOPE: ICD-10-PCS | Mod: QW,S$GLB,, | Performed by: FAMILY MEDICINE

## 2023-07-27 PROCEDURE — 99213 PR OFFICE/OUTPT VISIT, EST, LEVL III, 20-29 MIN: ICD-10-PCS | Mod: S$GLB,,, | Performed by: FAMILY MEDICINE

## 2023-07-27 PROCEDURE — 3074F SYST BP LT 130 MM HG: CPT | Mod: CPTII,S$GLB,, | Performed by: FAMILY MEDICINE

## 2023-07-27 PROCEDURE — 81514 NFCT DS BV&VAGINITIS DNA ALG: CPT | Performed by: FAMILY MEDICINE

## 2023-07-27 PROCEDURE — 1160F RVW MEDS BY RX/DR IN RCRD: CPT | Mod: CPTII,S$GLB,, | Performed by: FAMILY MEDICINE

## 2023-07-27 PROCEDURE — 99999 PR PBB SHADOW E&M-EST. PATIENT-LVL III: CPT | Mod: PBBFAC,,, | Performed by: FAMILY MEDICINE

## 2023-07-27 PROCEDURE — 81003 URINALYSIS AUTO W/O SCOPE: CPT | Mod: QW,S$GLB,, | Performed by: FAMILY MEDICINE

## 2023-07-27 PROCEDURE — 3074F PR MOST RECENT SYSTOLIC BLOOD PRESSURE < 130 MM HG: ICD-10-PCS | Mod: CPTII,S$GLB,, | Performed by: FAMILY MEDICINE

## 2023-07-27 PROCEDURE — 3078F PR MOST RECENT DIASTOLIC BLOOD PRESSURE < 80 MM HG: ICD-10-PCS | Mod: CPTII,S$GLB,, | Performed by: FAMILY MEDICINE

## 2023-07-27 PROCEDURE — 99213 OFFICE O/P EST LOW 20 MIN: CPT | Mod: S$GLB,,, | Performed by: FAMILY MEDICINE

## 2023-07-27 PROCEDURE — 1159F MED LIST DOCD IN RCRD: CPT | Mod: CPTII,S$GLB,, | Performed by: FAMILY MEDICINE

## 2023-07-27 PROCEDURE — 99999 PR PBB SHADOW E&M-EST. PATIENT-LVL III: ICD-10-PCS | Mod: PBBFAC,,, | Performed by: FAMILY MEDICINE

## 2023-07-27 PROCEDURE — 1159F PR MEDICATION LIST DOCUMENTED IN MEDICAL RECORD: ICD-10-PCS | Mod: CPTII,S$GLB,, | Performed by: FAMILY MEDICINE

## 2023-07-27 RX ORDER — TERCONAZOLE 4 MG/G
1 CREAM VAGINAL NIGHTLY
Qty: 45 G | Refills: 0 | Status: SHIPPED | OUTPATIENT
Start: 2023-07-27 | End: 2023-08-03

## 2023-07-27 RX ORDER — ERGOCALCIFEROL (VITAMIN D2) 10 MCG
400 TABLET ORAL
COMMUNITY

## 2023-07-27 NOTE — PROGRESS NOTES
CC: Vaginitis  HPI: Patient presents for evaluation of an abnormal vaginal discharge. Symptoms have been present for 1  month . Vaginal symptoms: discharge described as white, local irritation, urinary symptoms of dysuria, and vulvar itching. Some LBP. Contraception: none. She denies odor and urinary symptoms of hematuria and fever . Sexually transmitted infection risk: very low risk of STD exposure. SA male partner, does not use condoms. Tried 1 day monistat, monistat cream and BA suppository. This is the extent of the patient's complaints at this time.     ROS:  GENERAL: No fever, chills, fatigability or weight loss.  VULVAR: No pain, no lesions and + itching.  VAGINAL: No relaxation, no itching, + discharge, no abnormal bleeding and no lesions.  URINARY: No incontinence, no nocturia, no frequency and + dysuria (more when urine hits vaginal tissue).+lbp     PHYSICAL EXAM:  Physical Exam:   Constitutional: She appears well-developed and well-nourished. She does not appear ill. No distress.               Genitourinary:    Uterus, right adnexa and left adnexa normal.      Pelvic exam was performed with patient supine.   The external female genitalia was normal.   Labial bartholins normal.There is no rash, tenderness or lesion on the right labia. There is no rash, tenderness or lesion on the left labia. Cervix is normal. Right adnexum displays no mass, no tenderness and no fullness. Left adnexum displays no mass, no tenderness and no fullness. There is vaginal discharge (scant thick white) in the vagina. No tenderness, bleeding, rectocele, cystocele or unspecified prolapse of vaginal walls in the vagina.    No foreign body in the vagina.   Cervix exhibits no motion tenderness, no lesion, no discharge, no friability, no lesion and no polyp. Normal urethral meatus.Urethra findings: no urethral mass              Neurological: She is alert. GCS eye subscore is 4. GCS verbal subscore is 5. GCS motor subscore is 6.          Past Medical History:   Diagnosis Date    Depression 06/2015    Lexapro and Wellbrutrin for about 8months.    Infertility     Migraine headache     for 4 months.before pregnancy at beginning of cyles    Neurological abnormality     States she was diagnosed with Chiari malformation when being evaluated for migraines. Was told it is benign.       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      cyanocobalamin (VITAMIN B-12) 100 MCG tablet Take 100 mcg by mouth.      dextroamphetamine-amphetamine 5 mg Tab Take 1 tablet by mouth 2 (two) times daily.      ergocalciferol, vitamin D2, 10 mcg (400 unit) Tab Take 400 Units by mouth.      EScitalopram oxalate (LEXAPRO) 20 MG tablet Take 1 tablet (20 mg total) by mouth once daily. 90 tablet 3    ferrous sulfate (SLOW  RELEASE IRON) 142 mg (45 mg iron) TbSR Take 142 Units by mouth.      ibuprofen (ADVIL,MOTRIN) 600 MG tablet Take 1 tablet (600 mg total) by mouth 3 (three) times daily. 60 tablet 0    magnesium oxide (MAG-OX) 400 mg (241.3 mg magnesium) tablet Take 400 mg by mouth.      rimegepant (NURTEC) 75 mg odt Take 1 tablet (75 mg total) by mouth as needed for Migraine (once every 48 hours). Place ODT tablet on the tongue; alternatively the ODT tablet may be placed under the tongue 8 tablet 12    magnesium 200 mg Tab Take by mouth once.      terconazole (TERAZOL 7) 0.4 % Crea Place 1 applicator vaginally every evening. Can apply extra externally for 7 days 45 g 0     No current facility-administered medications for this visit.       Review of patient's allergies indicates:   Allergen Reactions    Augmentin [amoxicillin-pot clavulanate] Nausea And Vomiting     Other reaction(s): GI Intolerance    Bactrim [sulfamethoxazole-trimethoprim] Anaphylaxis    Sulfa (sulfonamide antibiotics) Anaphylaxis    [id number] antimigraine agents Other (See Comments)     Contraindicated due to hemiplegic type of migraines          OB History    Para Term  AB Living   4 4 4     5   SAB IAB Ectopic Multiple Live Births         1 5      # Outcome Date GA Lbr Cooper/2nd Weight Sex Delivery Anes PTL Lv   4 Term 21 38w1d / 00:18 2.89 kg (6 lb 5.9 oz) F Vag-Spont EPI N DAVID   3 Term 19 38w2d / 00:14 3.062 kg (6 lb 12 oz) F Vag-Spont EPI N DAVID   2 Term 17 39w0d / 00:12 2.9 kg (6 lb 6.3 oz) F Vag-Spont EPI N DAVID   1A Term 14 37w0d / 00:40 2.466 kg (5 lb 7 oz) F Vag-Spont EPI N DAVID   1B Term 14 37w0d / 00:45 2.466 kg (5 lb 7 oz) F Vag-Spont EPI N DAVID      Results for orders placed or performed in visit on 23   POCT Urinalysis, Dipstick, Automated, W/O Scope   Result Value Ref Range    POC Blood, Urine Negative Negative    POC Bilirubin, Urine Negative Negative    POC Urobilinogen, Urine normal 0.1 - 1.1     POC Ketones, Urine Negative Negative    POC Protein, Urine Negative Negative    POC Nitrates, Urine Negative Negative    POC Glucose, Urine Negative Negative    pH, UA 5     POC Specific Gravity, Urine 1.015 1.003 - 1.029    POC Leukocytes, Urine Negative Negative         Assessment/Plan:    Vaginal itching  -     Vaginosis Screen by DNA Probe    Dysuria  -     POCT Urinalysis, Dipstick, Automated, W/O Scope    Yeast vaginitis  -     terconazole (TERAZOL 7) 0.4 % Crea; Place 1 applicator vaginally every evening. Can apply extra externally for 7 days  Dispense: 45 g; Refill: 0          Patient was counseled today on vaginitis prevention including :  a. avoiding feminine products such as deoderant soaps, body wash, bubble bath, douches, scented toilet paper, deoderant tampons or pads, feminine wipes, chronic pad use, etc.  b. avoiding other vulvovaginal irritants such as long hot baths, humidity, tight, synthetic clothing, chlorine and sitting around in wet bathing suits  c. wearing cotton underwear, avoiding thong underwear and no underwear to bed  d. taking showers instead of baths and use a hair dryer on cool setting afterwards to dry  e. wearing cotton to exercise and shower immediately after exercise and change clothes  f. using polyurethane condoms without spermicide if sexually active and symptoms are triggered by intercourse     FOLLOW UP: PRN/lack of improvement.Answers submitted by the patient for this visit:  Vaginal Pain Questionnaire (Submitted on 7/27/2023)  Chief Complaint: Vaginal pain  Chronicity: new  Onset: more than 1 month ago  Frequency: intermittently  Progression since onset: waxing and waning  Pain severity: moderate  Affected side: both  Pregnant now?: No  abdominal pain: No  anorexia: Yes  chills: No  discolored urine: No  dysuria: Yes  fever: No  frequency: Yes  nausea: No  painful intercourse: No  rash: No  urgency: Yes  vomiting: No

## 2023-07-31 LAB
BACTERIAL VAGINOSIS DNA: NEGATIVE
CANDIDA GLABRATA DNA: NEGATIVE
CANDIDA KRUSEI DNA: NEGATIVE
CANDIDA RRNA VAG QL PROBE: NEGATIVE
T VAGINALIS RRNA GENITAL QL PROBE: NEGATIVE

## 2023-10-25 ENCOUNTER — PROCEDURE VISIT (OUTPATIENT)
Dept: NEUROLOGY | Facility: CLINIC | Age: 38
End: 2023-10-25
Payer: COMMERCIAL

## 2023-10-25 VITALS
BODY MASS INDEX: 18.94 KG/M2 | WEIGHT: 125 LBS | SYSTOLIC BLOOD PRESSURE: 120 MMHG | DIASTOLIC BLOOD PRESSURE: 77 MMHG | HEIGHT: 68 IN | HEART RATE: 75 BPM

## 2023-10-25 DIAGNOSIS — G43.709 CHRONIC MIGRAINE WITHOUT AURA WITHOUT STATUS MIGRAINOSUS, NOT INTRACTABLE: Primary | ICD-10-CM

## 2023-10-25 PROCEDURE — 64615 CHEMODENERV MUSC MIGRAINE: CPT | Mod: S$GLB,,, | Performed by: PSYCHIATRY & NEUROLOGY

## 2023-10-25 PROCEDURE — 64615 PR CHEMODENERVATION OF MUSCLE FOR CHRONIC MIGRAINE: ICD-10-PCS | Mod: S$GLB,,, | Performed by: PSYCHIATRY & NEUROLOGY

## 2023-10-25 RX ORDER — ONDANSETRON 4 MG/1
4 TABLET, ORALLY DISINTEGRATING ORAL EVERY 8 HOURS PRN
Qty: 20 TABLET | Refills: 6 | Status: SHIPPED | OUTPATIENT
Start: 2023-10-25

## 2023-10-25 NOTE — PROCEDURES
Procedures  BOTOX was performed as an indicated therapy for intractable chronic migraine headaches given that the patient failed more than 2 headache medications    Botulinum Toxin Injection Procedure   Pre-operative diagnosis: Chronic migraine   Post-operative diagnosis: Same   Procedure: Chemical neurolysis   After risks and benefits were explained including bleeding, infection, worsening of pain, damage to the areas being injected, weakness of muscles, loss of muscle control, dysphagia if injecting the head or neck, facial droop if injecting the facial area, painful injection, allergic or other reaction to the medications being injected, and the failure of the procedure to help the problem, a signed consent was obtained. Time out was called with confirmation of name and date of birth as well as the treatment site with a witness in the room.  The patient was placed in a comfortable area and the sites to be treated were identified.  Next, a 30 gauge needle was used to inject the medication in the area to be treated.   Area(s) injected:   Total Botox used: 155 Units   Botox wastage: 45 Units   Injection sites:    muscle bilaterally ( a total of 10 units divided into 2 sites)   Procerus muscle (5 units)   Frontalis muscle bilaterally (a total of 20 units divided into 4 sites)   Temporalis muscle bilaterally (a total of 40 units divided into 8 sites)   Occipitalis muscle bilaterally (a total of 30 units divided into 6 sites)   Cervical paraspinal muscles (a total of 20 units divided into 4 sites)   Trapezius muscle bilaterally (a total of 30 units divided into 6 sites)   Complications: none   RTC for the next Botox injection: 3 months     The patient tolerated the procedure well and did not experience any complications.     Problem List Items Addressed This Visit          Neuro    Chronic migraine without aura without status migrainosus, not intractable - Primary    Overview     She has tried and failed the  following medications for her headaches:  Sumatriptan, Lexapro, Xanax, Wellbutrin, Fioricet, Excedrin, ibuprofen, naproxen, Zofran, Percocet, Phenergan, Imitrex, Topamax    Has more than 16 days of headache per month, lasting more than 4 hours at a time, 10/10 headaches with word finding difficulty, and hemisensory changes - reduced by more than 50% with botox for migraine    Botox on 10/23/23         Relevant Medications    onabotulinumtoxina injection 200 Units (Completed)    ondansetron (ZOFRAN-ODT) 4 MG TbDL    Other Relevant Orders    Prior authorization Order

## 2023-10-30 DIAGNOSIS — F41.9 ANXIETY: ICD-10-CM

## 2023-11-08 RX ORDER — ESCITALOPRAM OXALATE 20 MG/1
20 TABLET ORAL DAILY
Qty: 90 TABLET | Refills: 3 | Status: SHIPPED | OUTPATIENT
Start: 2023-11-08 | End: 2024-10-10

## 2023-12-17 ENCOUNTER — PATIENT MESSAGE (OUTPATIENT)
Dept: NEUROLOGY | Facility: CLINIC | Age: 38
End: 2023-12-17
Payer: COMMERCIAL

## 2023-12-17 DIAGNOSIS — F33.8 SEASONAL AFFECTIVE DISORDER: ICD-10-CM

## 2023-12-17 DIAGNOSIS — F41.9 ANXIETY: Primary | ICD-10-CM

## 2023-12-18 RX ORDER — BUPROPION HYDROCHLORIDE 75 MG/1
75 TABLET ORAL 2 TIMES DAILY
Qty: 60 TABLET | Refills: 3 | Status: SHIPPED | OUTPATIENT
Start: 2023-12-18 | End: 2024-02-22

## 2023-12-18 NOTE — TELEPHONE ENCOUNTER
Per Dr. Krishna Loja:    We have had a phone call about this issue and discussed alternatives to lexapro. We decided to add the wellbutrin.

## 2024-01-18 ENCOUNTER — PATIENT MESSAGE (OUTPATIENT)
Dept: NEUROLOGY | Facility: CLINIC | Age: 39
End: 2024-01-18
Payer: COMMERCIAL

## 2024-02-14 ENCOUNTER — HOSPITAL ENCOUNTER (OUTPATIENT)
Dept: RADIOLOGY | Facility: OTHER | Age: 39
Discharge: HOME | End: 2024-02-14
Attending: INTERNAL MEDICINE
Payer: COMMERCIAL

## 2024-02-14 DIAGNOSIS — Z12.31 ENCOUNTER FOR SCREENING MAMMOGRAM FOR BREAST CANCER: ICD-10-CM

## 2024-02-14 PROCEDURE — 77067 SCR MAMMO BI INCL CAD: CPT | Mod: TC

## 2024-02-14 PROCEDURE — 77067 SCR MAMMO BI INCL CAD: CPT | Mod: 26,,, | Performed by: RADIOLOGY

## 2024-02-14 PROCEDURE — 77063 BREAST TOMOSYNTHESIS BI: CPT | Mod: 26,,, | Performed by: RADIOLOGY

## 2024-02-20 NOTE — ED NOTES
Pt given discharge instructions, verbally and written. Pt verbalized understanding. All questions answered. No further questions at this time. Pt discharged to home.   
none

## 2024-02-22 ENCOUNTER — PROCEDURE VISIT (OUTPATIENT)
Dept: NEUROLOGY | Facility: CLINIC | Age: 39
End: 2024-02-22
Payer: COMMERCIAL

## 2024-02-22 VITALS
WEIGHT: 128 LBS | DIASTOLIC BLOOD PRESSURE: 79 MMHG | SYSTOLIC BLOOD PRESSURE: 111 MMHG | HEART RATE: 67 BPM | HEIGHT: 68 IN | BODY MASS INDEX: 19.4 KG/M2

## 2024-02-22 DIAGNOSIS — F33.8 SEASONAL AFFECTIVE DISORDER: ICD-10-CM

## 2024-02-22 DIAGNOSIS — G43.709 CHRONIC MIGRAINE WITHOUT AURA WITHOUT STATUS MIGRAINOSUS, NOT INTRACTABLE: Primary | ICD-10-CM

## 2024-02-22 DIAGNOSIS — F41.9 ANXIETY: ICD-10-CM

## 2024-02-22 PROCEDURE — 64615 CHEMODENERV MUSC MIGRAINE: CPT | Mod: S$GLB,,, | Performed by: PSYCHIATRY & NEUROLOGY

## 2024-02-22 RX ORDER — RIMEGEPANT SULFATE 75 MG/75MG
75 TABLET, ORALLY DISINTEGRATING ORAL
Qty: 8 TABLET | Refills: 12 | Status: SHIPPED | OUTPATIENT
Start: 2024-02-22

## 2024-02-22 RX ORDER — BUPROPION HYDROCHLORIDE 75 MG/1
TABLET ORAL
Qty: 30 TABLET | Refills: 1 | Status: SHIPPED | OUTPATIENT
Start: 2024-02-22 | End: 2024-05-23

## 2024-02-22 NOTE — PROCEDURES
Procedures  BOTOX was performed as an indicated therapy for intractable chronic migraine headaches given that the patient failed more than 2 headache medications    Botulinum Toxin Injection Procedure   Pre-operative diagnosis: Chronic migraine   Post-operative diagnosis: Same   Procedure: Chemical neurolysis   After risks and benefits were explained including bleeding, infection, worsening of pain, damage to the areas being injected, weakness of muscles, loss of muscle control, dysphagia if injecting the head or neck, facial droop if injecting the facial area, painful injection, allergic or other reaction to the medications being injected, and the failure of the procedure to help the problem, a signed consent was obtained. Time out was called with confirmation of name and date of birth as well as the treatment site with a witness in the room.  The patient was placed in a comfortable area and the sites to be treated were identified.  Next, a 30 gauge needle was used to inject the medication in the area to be treated.   Area(s) injected:   Total Botox used: 155 Units   Botox wastage: 45 Units   Injection sites:    muscle bilaterally ( a total of 10 units divided into 2 sites)   Procerus muscle (5 units)   Frontalis muscle bilaterally (a total of 20 units divided into 4 sites)   Temporalis muscle bilaterally (a total of 40 units divided into 8 sites)   Occipitalis muscle bilaterally (a total of 30 units divided into 6 sites)   Cervical paraspinal muscles (a total of 20 units divided into 4 sites)   Trapezius muscle bilaterally (a total of 30 units divided into 6 sites)   Complications: none   RTC for the next Botox injection: 3 months     The patient tolerated the procedure well and did not experience any complications.     Problem List Items Addressed This Visit          Neuro    Chronic migraine without aura without status migrainosus, not intractable - Primary    Overview     She has tried and failed the  following medications for her headaches:  Sumatriptan, Lexapro, Xanax, Wellbutrin, Fioricet, Excedrin, ibuprofen, naproxen, Zofran, Percocet, Phenergan, Imitrex, Topamax    Has more than 16 days of headache per month, lasting more than 4 hours at a time, 10/10 headaches with word finding difficulty, and hemisensory changes - reduced by more than 50% with botox for migraine    Botox on 10/23/23, 2/22/24         Relevant Medications    onabotulinumtoxina injection 200 Units    onabotulinumtoxina injection 200 Units (Completed)    rimegepant (NURTEC) 75 mg odt    Other Relevant Orders    Prior authorization Order    Prior authorization Order       Psychiatric    Anxiety    Relevant Medications    buPROPion (WELLBUTRIN) 75 MG tablet     Other Visit Diagnoses       Seasonal affective disorder        Relevant Medications    buPROPion (WELLBUTRIN) 75 MG tablet

## 2024-05-23 ENCOUNTER — PROCEDURE VISIT (OUTPATIENT)
Dept: NEUROLOGY | Facility: CLINIC | Age: 39
End: 2024-05-23
Payer: COMMERCIAL

## 2024-05-23 VITALS
WEIGHT: 128.06 LBS | HEART RATE: 86 BPM | SYSTOLIC BLOOD PRESSURE: 112 MMHG | DIASTOLIC BLOOD PRESSURE: 82 MMHG | BODY MASS INDEX: 19.41 KG/M2 | HEIGHT: 68 IN

## 2024-05-23 DIAGNOSIS — F41.9 ANXIETY: ICD-10-CM

## 2024-05-23 DIAGNOSIS — G43.709 CHRONIC MIGRAINE WITHOUT AURA WITHOUT STATUS MIGRAINOSUS, NOT INTRACTABLE: Primary | ICD-10-CM

## 2024-05-23 PROCEDURE — 64615 CHEMODENERV MUSC MIGRAINE: CPT | Mod: S$GLB,,, | Performed by: PSYCHIATRY & NEUROLOGY

## 2024-05-23 RX ORDER — ESCITALOPRAM OXALATE 20 MG/1
20 TABLET ORAL DAILY
Qty: 90 TABLET | Refills: 3 | Status: SHIPPED | OUTPATIENT
Start: 2024-05-23 | End: 2025-04-25

## 2024-05-23 NOTE — PROCEDURES
Procedures    BOTOX was performed as an indicated therapy for intractable chronic migraine headaches given that the patient failed more than 2 headache medications    Botulinum Toxin Injection Procedure   Pre-operative diagnosis: Chronic migraine   Post-operative diagnosis: Same   Procedure: Chemical neurolysis   After risks and benefits were explained including bleeding, infection, worsening of pain, damage to the areas being injected, weakness of muscles, loss of muscle control, dysphagia if injecting the head or neck, facial droop if injecting the facial area, painful injection, allergic or other reaction to the medications being injected, and the failure of the procedure to help the problem, a signed consent was obtained. Time out was called with confirmation of name and date of birth as well as the treatment site with a witness in the room.  The patient was placed in a comfortable area and the sites to be treated were identified.  Next, a 30 gauge needle was used to inject the medication in the area to be treated.   Area(s) injected:   Total Botox used: 155 Units   Botox wastage: 45 Units   Injection sites:    muscle bilaterally ( a total of 10 units divided into 2 sites)   Procerus muscle (5 units)   Frontalis muscle bilaterally (a total of 20 units divided into 4 sites)   Temporalis muscle bilaterally (a total of 40 units divided into 8 sites)   Occipitalis muscle bilaterally (a total of 30 units divided into 6 sites)   Cervical paraspinal muscles (a total of 20 units divided into 4 sites)   Trapezius muscle bilaterally (a total of 30 units divided into 6 sites)   Complications: none   RTC for the next Botox injection: 3 months     The patient tolerated the procedure well and did not experience any complications.     Problem List Items Addressed This Visit          Neuro    Chronic migraine without aura without status migrainosus, not intractable - Primary    Overview     She has tried and failed  the following medications for her headaches:  Sumatriptan, Lexapro, Xanax, Wellbutrin, Fioricet, Excedrin, ibuprofen, naproxen, Zofran, Percocet, Phenergan, Imitrex, Topamax    Has more than 16 days of headache per month, lasting more than 4 hours at a time, 10/10 headaches with word finding difficulty, and hemisensory changes - reduced by more than 50% with botox for migraine    Botox on 10/23/23, 2/22/24, 5/23/24         Relevant Medications    onabotulinumtoxina injection 200 Units (Completed)    Other Relevant Orders    Prior authorization Order       Psychiatric    Anxiety    Relevant Medications    EScitalopram oxalate (LEXAPRO) 20 MG tablet
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
## 2024-08-20 ENCOUNTER — PROCEDURE VISIT (OUTPATIENT)
Dept: NEUROLOGY | Facility: CLINIC | Age: 39
End: 2024-08-20
Payer: COMMERCIAL

## 2024-08-20 VITALS
BODY MASS INDEX: 19.48 KG/M2 | HEIGHT: 68 IN | SYSTOLIC BLOOD PRESSURE: 120 MMHG | HEART RATE: 64 BPM | DIASTOLIC BLOOD PRESSURE: 77 MMHG

## 2024-08-20 DIAGNOSIS — M54.2 CERVICALGIA: ICD-10-CM

## 2024-08-20 DIAGNOSIS — F41.9 ANXIETY: ICD-10-CM

## 2024-08-20 DIAGNOSIS — G43.709 CHRONIC MIGRAINE WITHOUT AURA WITHOUT STATUS MIGRAINOSUS, NOT INTRACTABLE: Primary | ICD-10-CM

## 2024-08-20 PROCEDURE — 64615 CHEMODENERV MUSC MIGRAINE: CPT | Mod: S$GLB,,,

## 2024-11-12 ENCOUNTER — PATIENT MESSAGE (OUTPATIENT)
Dept: NEUROLOGY | Facility: CLINIC | Age: 39
End: 2024-11-12
Payer: COMMERCIAL

## 2024-11-19 ENCOUNTER — PATIENT MESSAGE (OUTPATIENT)
Dept: NEUROLOGY | Facility: CLINIC | Age: 39
End: 2024-11-19

## 2025-02-18 ENCOUNTER — PATIENT MESSAGE (OUTPATIENT)
Facility: CLINIC | Age: 40
End: 2025-02-18
Payer: COMMERCIAL

## 2025-05-08 NOTE — TELEPHONE ENCOUNTER
"Spoke to .Informed him that we have an appointment on 1/26 to see Francisco Rabago for headaches- became very belligerent and hostile over the phone.Informed him also that we could place Patient on a wait list to be seen high priority-informed him that if there is a cancellation she would be called."don't you understand-my wife has a migraine"I informed him that yes I certainly do-he then became loud in tone and stated "who are you and what is your title"Informed him that I am a Registered Nurse and work with headache Patients."My wife is due soon"He then stated "I want to speak to someone else"Instructed him that I would personally see to it that we would do everything humanly possible to get Patient in to be seen tomorrow.Informed him that I would speak to Dr Lord personally-"well I want a call back in 30 minutes"Instructed  this may not be possible,but I would call him back as soon as possible.Spoke to Dr Bella,who will see Patient tomorrow Informed Devorah Coronel of his behavior and abrupt attitude with me.ROBERTO Stubbs offered to call with appointment time and date,but I will personally call  as promised.  " LOV: 10/01/24    Next Visit: 11/5/25

## 2025-05-28 ENCOUNTER — PROCEDURE VISIT (OUTPATIENT)
Facility: CLINIC | Age: 40
End: 2025-05-28
Payer: COMMERCIAL

## 2025-05-28 VITALS
WEIGHT: 131.06 LBS | DIASTOLIC BLOOD PRESSURE: 72 MMHG | HEART RATE: 84 BPM | SYSTOLIC BLOOD PRESSURE: 95 MMHG | BODY MASS INDEX: 19.93 KG/M2

## 2025-05-28 DIAGNOSIS — F41.9 ANXIETY: ICD-10-CM

## 2025-05-28 DIAGNOSIS — G44.86 CERVICOGENIC HEADACHE: ICD-10-CM

## 2025-05-28 DIAGNOSIS — G47.00 INSOMNIA, UNSPECIFIED TYPE: ICD-10-CM

## 2025-05-28 DIAGNOSIS — G93.5 CHIARI I MALFORMATION: ICD-10-CM

## 2025-05-28 DIAGNOSIS — G43.709 CHRONIC MIGRAINE WITHOUT AURA WITHOUT STATUS MIGRAINOSUS, NOT INTRACTABLE: Primary | ICD-10-CM

## 2025-05-28 RX ORDER — BACLOFEN 10 MG/1
10 TABLET ORAL 2 TIMES DAILY
Qty: 30 TABLET | Refills: 0 | Status: SHIPPED | OUTPATIENT
Start: 2025-05-28 | End: 2025-05-28

## 2025-05-28 RX ORDER — BACLOFEN 10 MG/1
10 TABLET ORAL 2 TIMES DAILY
Qty: 30 TABLET | Refills: 0 | Status: SHIPPED | OUTPATIENT
Start: 2025-05-28 | End: 2026-05-28

## 2025-05-28 RX ORDER — RIMEGEPANT SULFATE 75 MG/75MG
75 TABLET, ORALLY DISINTEGRATING ORAL
Qty: 8 TABLET | Refills: 12 | Status: SHIPPED | OUTPATIENT
Start: 2025-05-28

## 2025-05-28 RX ORDER — ONDANSETRON 4 MG/1
4 TABLET, ORALLY DISINTEGRATING ORAL EVERY 8 HOURS PRN
Qty: 20 TABLET | Refills: 6 | Status: SHIPPED | OUTPATIENT
Start: 2025-05-28
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
## 2025-06-01 DIAGNOSIS — G43.709 CHRONIC MIGRAINE WITHOUT AURA WITHOUT STATUS MIGRAINOSUS, NOT INTRACTABLE: Primary | ICD-10-CM

## 2025-06-02 RX ORDER — METHYLPREDNISOLONE 4 MG/1
TABLET ORAL
Qty: 21 EACH | Refills: 0 | Status: CANCELLED | OUTPATIENT
Start: 2025-06-02 | End: 2025-06-23

## 2025-06-02 RX ORDER — UBROGEPANT 100 MG/1
100 TABLET ORAL
Qty: 10 TABLET | Refills: 2 | Status: CANCELLED | OUTPATIENT
Start: 2025-06-02

## 2025-06-09 DIAGNOSIS — G44.86 CERVICOGENIC HEADACHE: ICD-10-CM

## 2025-06-09 RX ORDER — BACLOFEN 10 MG/1
10 TABLET ORAL 2 TIMES DAILY
Qty: 180 TABLET | Refills: 0 | Status: SHIPPED | OUTPATIENT
Start: 2025-06-09 | End: 2026-06-09

## 2025-07-31 ENCOUNTER — TELEPHONE (OUTPATIENT)
Dept: NEUROLOGY | Facility: CLINIC | Age: 40
End: 2025-07-31
Payer: COMMERCIAL

## 2025-07-31 NOTE — TELEPHONE ENCOUNTER
Called pt twice with no answer. LVM asking for pt to call us back to schedule an appt with a doctor for her headaches if she still needs it.

## 2025-08-05 ENCOUNTER — TELEPHONE (OUTPATIENT)
Dept: NEUROLOGY | Facility: CLINIC | Age: 40
End: 2025-08-05
Payer: COMMERCIAL

## 2025-08-06 NOTE — TELEPHONE ENCOUNTER
Called pt/pt's spouse twice with no answer. LVM asking for pt to call us back to schedule an appt with a doctor for her headaches if she still needs it.